# Patient Record
Sex: MALE | Race: WHITE | NOT HISPANIC OR LATINO | Employment: OTHER | URBAN - METROPOLITAN AREA
[De-identification: names, ages, dates, MRNs, and addresses within clinical notes are randomized per-mention and may not be internally consistent; named-entity substitution may affect disease eponyms.]

---

## 2017-03-10 ENCOUNTER — TELEPHONE (OUTPATIENT)
Dept: CARDIOLOGY | Facility: CLINIC | Age: 67
End: 2017-03-10

## 2017-03-10 NOTE — TELEPHONE ENCOUNTER
Patient called requesting samples of Brilinta.  I attempted to call patient back and left 2 messages for him to call back to find out what dose he was taking.   LMCTB at 11:58am  LMCTB at 3:52pm

## 2017-03-20 ENCOUNTER — OFFICE VISIT (OUTPATIENT)
Dept: CARDIOLOGY | Facility: CLINIC | Age: 67
End: 2017-03-20

## 2017-03-20 VITALS
HEIGHT: 71 IN | HEART RATE: 83 BPM | SYSTOLIC BLOOD PRESSURE: 130 MMHG | DIASTOLIC BLOOD PRESSURE: 78 MMHG | BODY MASS INDEX: 26.01 KG/M2 | WEIGHT: 185.8 LBS

## 2017-03-20 DIAGNOSIS — I25.10 CORONARY ARTERY DISEASE INVOLVING NATIVE CORONARY ARTERY OF NATIVE HEART WITHOUT ANGINA PECTORIS: Primary | ICD-10-CM

## 2017-03-20 DIAGNOSIS — I10 ESSENTIAL HYPERTENSION: ICD-10-CM

## 2017-03-20 DIAGNOSIS — Z72.0 TOBACCO ABUSE: ICD-10-CM

## 2017-03-20 DIAGNOSIS — E78.00 HYPERCHOLESTEROLEMIA: ICD-10-CM

## 2017-03-20 PROCEDURE — 99406 BEHAV CHNG SMOKING 3-10 MIN: CPT | Performed by: PHYSICIAN ASSISTANT

## 2017-03-20 PROCEDURE — 99214 OFFICE O/P EST MOD 30 MIN: CPT | Performed by: PHYSICIAN ASSISTANT

## 2017-03-20 PROCEDURE — 93000 ELECTROCARDIOGRAM COMPLETE: CPT | Performed by: PHYSICIAN ASSISTANT

## 2017-03-20 RX ORDER — SOTALOL HYDROCHLORIDE 80 MG/1
80 TABLET ORAL 2 TIMES DAILY
COMMUNITY
End: 2017-09-27 | Stop reason: SDUPTHER

## 2017-03-20 RX ORDER — NIFEDIPINE 30 MG/1
30 TABLET, EXTENDED RELEASE ORAL DAILY
COMMUNITY
End: 2017-09-27

## 2017-03-20 RX ORDER — ATORVASTATIN CALCIUM 20 MG/1
20 TABLET, FILM COATED ORAL DAILY
COMMUNITY
End: 2017-09-27 | Stop reason: SDUPTHER

## 2017-03-20 RX ORDER — ASPIRIN 325 MG
325 TABLET ORAL DAILY
COMMUNITY
End: 2017-09-27

## 2017-03-20 NOTE — PROGRESS NOTES
[facility] - Office Note  Scott Milan         [address] [place] [address]  1950   [phone] (home)      LOCATION:  [facility].  Visit Type: Follow Up.    PCP:  Tigre Cates MD    03/20/17   Scott Milan is a 67 y.o.  male  retired.      Chief Complaint: Follow up on CAD, HTN.  Problem list:  1.  Coronary artery disease   A.  [facility], February 20, 2015: ANALI to OM, 50% occlusion of mid RCA, 60-70% occlusion of LAD.   B.  scheduled functional assessment via left heart catheterization 3 weeks after intervention, patient did not keep appointment.   C.  recurrent chest pain with left heart catheterization May 12, 2015: Drug-eluting stent to LAD, drug-eluting stent to the RCA.  EF 50%.  2.  Essential hypertension  3.  Dyslipidemia  4.  COPD with ongoing tobacco abuse  5.  History of ventricular fibrillation with arrest, 2012  6.  Family history precocious CAD        Allergies   Allergen Reactions   • Clopidogrel  Rash, pruritus    • Prednisone          Current Outpatient Prescriptions:   •  aspirin 325 MG tablet, Take 325 mg by mouth Daily., Disp: , Rfl:   •  atorvastatin (LIPITOR) 20 MG tablet, Take 20 mg by mouth Daily., Disp: , Rfl:   •  HYDROcodone-acetaminophen (VICODIN) 7.5-500 MG per tablet, Take 1 tablet by mouth 2 (Two) Times a Day., Disp: , Rfl:   •  NIFEdipine XL (PROCARDIA XL) 30 MG 24 hr tablet, Take 30 mg by mouth Daily., Disp: , Rfl:   •  sotalol (BETAPACE) 80 MG tablet, Take 80 mg by mouth 2 (Two) Times a Day., Disp: , Rfl:   •  ticagrelor (BRILINTA) 90 MG tablet tablet, Take 90 mg by mouth Daily., Disp: , Rfl:     HPI  Mr. Milan is here for an overdue follow-up on his coronary artery disease and long-standing hypertension and dyslipidemia.  From a cardiac standpoint he's done well since his last visit.  He is then and able to take Plavix or Effient secondary to rash development.  He is currently on Brilinta and cannot afford it.   "We have been providing him samples and he is asking for more.  As far as his heart is concerned, he's had no cardiac issues.  He states he gets routine bloodwork with his PCP.  He's had no palpitations or acknowledgment of arrhythmia.  He does continue to smoke.  The following portions of the patient's history were reviewed in the chart and updated as appropriate: allergies, current medications, past family history, past medical history, past social history, past surgical history and problem list.    Review of Systems   Cardiovascular: Negative for chest pain and dyspnea on exertion.   Respiratory: Negative for shortness of breath.    All other systems reviewed and are negative.            height is 71\" (180.3 cm) and weight is 185 lb 12.8 oz (84.3 kg). His blood pressure is 130/78 and his pulse is 83.   Physical Exam   Constitutional: Vital signs are normal. He appears well-developed and well-nourished.   Cardiovascular: Normal rate, regular rhythm, S1 normal, S2 normal, normal heart sounds, intact distal pulses and normal pulses.    Pulmonary/Chest: Effort normal and breath sounds normal. He has no wheezes. He has no rhonchi. He has no rales.   Abdominal: Soft. Normal appearance and bowel sounds are normal. There is no hepatosplenomegaly.   Neurological: He is alert.           ECG 12 Lead  Date/Time: 3/20/2017 11:13 AM  Performed by: FRANK ISRAEL  Authorized by: FRANK ISRAEL   Previous ECG: no previous ECG available  Rhythm: sinus rhythm  Rate: normal  QRS axis: normal  Clinical impression: normal ECG             Assessment/ Plan     Coronary artery disease involving native coronary artery of native heart without angina pectoris:  Stable without symptoms.  Continue on current medical regimen.  [place] to samples were provided.  Return to clinic one year or sooner when necessary.    Essential hypertension:  Well-controlled.  Continue current medical regimen.    Hypercholesterolemia:  Continue statin and " appropriate diet.  Get routine bloodwork with PCP.    Tobacco abuse: Patient was educated on smoking cessation 3-5 minutes in duration.  I offered cessation aids such as nicotine patch, nicotine gum and even Chantix.  Patient states he wants to try to cut back on his own.  I've given him a goal to cut back to one half pack per day at time of follow-up.      Olive Kennedy PA-C  3/20/2017 1:43 PM      EMR Dragon/Transcription disclaimer:   Much of this encounter note is an electronic transcription/translation of spoken language to printed text. The electronic translation of spoken language may permit erroneous, or at times, nonsensical words or phrases to be inadvertently transcribed; Although I have reviewed the note for such errors, some may still exist.

## 2017-06-22 ENCOUNTER — APPOINTMENT (OUTPATIENT)
Dept: GENERAL RADIOLOGY | Facility: HOSPITAL | Age: 67
End: 2017-06-22

## 2017-06-22 ENCOUNTER — HOSPITAL ENCOUNTER (EMERGENCY)
Facility: HOSPITAL | Age: 67
Discharge: HOME | End: 2017-06-22
Attending: EMERGENCY MEDICINE | Admitting: EMERGENCY MEDICINE

## 2017-06-22 VITALS
TEMPERATURE: 97.7 F | HEIGHT: 67 IN | RESPIRATION RATE: 20 BRPM | BODY MASS INDEX: 29.03 KG/M2 | HEART RATE: 79 BPM | DIASTOLIC BLOOD PRESSURE: 90 MMHG | WEIGHT: 185 LBS | SYSTOLIC BLOOD PRESSURE: 154 MMHG | OXYGEN SATURATION: 97 %

## 2017-06-22 DIAGNOSIS — R05.9 COUGH: ICD-10-CM

## 2017-06-22 DIAGNOSIS — S16.1XXA ACUTE STRAIN OF NECK MUSCLE, INITIAL ENCOUNTER: ICD-10-CM

## 2017-06-22 DIAGNOSIS — J44.9 CHRONIC OBSTRUCTIVE PULMONARY DISEASE, UNSPECIFIED COPD TYPE (HCC): Primary | ICD-10-CM

## 2017-06-22 LAB
ALBUMIN SERPL-MCNC: 4.3 G/DL (ref 3.2–4.8)
ALBUMIN/GLOB SERPL: 1.5 G/DL (ref 1.5–2.5)
ALP SERPL-CCNC: 121 U/L (ref 25–100)
ALT SERPL W P-5'-P-CCNC: 17 U/L (ref 7–40)
ANION GAP SERPL CALCULATED.3IONS-SCNC: 7 MMOL/L (ref 3–11)
AST SERPL-CCNC: 18 U/L (ref 0–33)
BASOPHILS # BLD AUTO: 0.03 10*3/MM3 (ref 0–0.2)
BASOPHILS NFR BLD AUTO: 0.4 % (ref 0–1)
BILIRUB SERPL-MCNC: 0.4 MG/DL (ref 0.3–1.2)
BNP SERPL-MCNC: 11 PG/ML (ref 0–100)
BUN BLD-MCNC: 15 MG/DL (ref 9–23)
BUN/CREAT SERPL: 25 (ref 7–25)
CALCIUM SPEC-SCNC: 9.2 MG/DL (ref 8.7–10.4)
CHLORIDE SERPL-SCNC: 108 MMOL/L (ref 99–109)
CO2 SERPL-SCNC: 22 MMOL/L (ref 20–31)
CREAT BLD-MCNC: 0.6 MG/DL (ref 0.6–1.3)
DEPRECATED RDW RBC AUTO: 44.4 FL (ref 37–54)
EOSINOPHIL # BLD AUTO: 0.39 10*3/MM3 (ref 0.1–0.3)
EOSINOPHIL NFR BLD AUTO: 4.7 % (ref 0–3)
ERYTHROCYTE [DISTWIDTH] IN BLOOD BY AUTOMATED COUNT: 13.2 % (ref 11.3–14.5)
GFR SERPL CREATININE-BSD FRML MDRD: 134 ML/MIN/1.73
GLOBULIN UR ELPH-MCNC: 2.8 GM/DL
GLUCOSE BLD-MCNC: 95 MG/DL (ref 70–100)
HCT VFR BLD AUTO: 38.7 % (ref 38.9–50.9)
HGB BLD-MCNC: 12.4 G/DL (ref 13.1–17.5)
HOLD SPECIMEN: NORMAL
HOLD SPECIMEN: NORMAL
IMM GRANULOCYTES # BLD: 0.02 10*3/MM3 (ref 0–0.03)
IMM GRANULOCYTES NFR BLD: 0.2 % (ref 0–0.6)
LYMPHOCYTES # BLD AUTO: 2.15 10*3/MM3 (ref 0.6–4.8)
LYMPHOCYTES NFR BLD AUTO: 26.1 % (ref 24–44)
MCH RBC QN AUTO: 29.6 PG (ref 27–31)
MCHC RBC AUTO-ENTMCNC: 32 G/DL (ref 32–36)
MCV RBC AUTO: 92.4 FL (ref 80–99)
MONOCYTES # BLD AUTO: 0.87 10*3/MM3 (ref 0–1)
MONOCYTES NFR BLD AUTO: 10.6 % (ref 0–12)
NEUTROPHILS # BLD AUTO: 4.78 10*3/MM3 (ref 1.5–8.3)
NEUTROPHILS NFR BLD AUTO: 58 % (ref 41–71)
PLATELET # BLD AUTO: 227 10*3/MM3 (ref 150–450)
PMV BLD AUTO: 10.2 FL (ref 6–12)
POTASSIUM BLD-SCNC: 3.4 MMOL/L (ref 3.5–5.5)
PROT SERPL-MCNC: 7.1 G/DL (ref 5.7–8.2)
RBC # BLD AUTO: 4.19 10*6/MM3 (ref 4.2–5.76)
SODIUM BLD-SCNC: 137 MMOL/L (ref 132–146)
TROPONIN I SERPL-MCNC: 0 NG/ML (ref 0–0.07)
WBC NRBC COR # BLD: 8.24 10*3/MM3 (ref 3.5–10.8)
WHOLE BLOOD HOLD SPECIMEN: NORMAL
WHOLE BLOOD HOLD SPECIMEN: NORMAL

## 2017-06-22 PROCEDURE — 99284 EMERGENCY DEPT VISIT MOD MDM: CPT

## 2017-06-22 PROCEDURE — 71010 HC CHEST PA OR AP: CPT

## 2017-06-22 PROCEDURE — 94760 N-INVAS EAR/PLS OXIMETRY 1: CPT

## 2017-06-22 PROCEDURE — 80053 COMPREHEN METABOLIC PANEL: CPT | Performed by: EMERGENCY MEDICINE

## 2017-06-22 PROCEDURE — 83880 ASSAY OF NATRIURETIC PEPTIDE: CPT | Performed by: EMERGENCY MEDICINE

## 2017-06-22 PROCEDURE — 84484 ASSAY OF TROPONIN QUANT: CPT

## 2017-06-22 PROCEDURE — 85025 COMPLETE CBC W/AUTO DIFF WBC: CPT | Performed by: EMERGENCY MEDICINE

## 2017-06-22 PROCEDURE — 94799 UNLISTED PULMONARY SVC/PX: CPT

## 2017-06-22 PROCEDURE — 93005 ELECTROCARDIOGRAM TRACING: CPT

## 2017-06-22 RX ORDER — IPRATROPIUM BROMIDE AND ALBUTEROL SULFATE 2.5; .5 MG/3ML; MG/3ML
3 SOLUTION RESPIRATORY (INHALATION) ONCE
Status: COMPLETED | OUTPATIENT
Start: 2017-06-22 | End: 2017-06-22

## 2017-06-22 RX ORDER — SODIUM CHLORIDE 0.9 % (FLUSH) 0.9 %
10 SYRINGE (ML) INJECTION AS NEEDED
Status: DISCONTINUED | OUTPATIENT
Start: 2017-06-22 | End: 2017-06-22 | Stop reason: HOSPADM

## 2017-06-22 RX ORDER — CYCLOBENZAPRINE HCL 10 MG
10 TABLET ORAL 3 TIMES DAILY
Qty: 20 TABLET | Refills: 0 | Status: SHIPPED | OUTPATIENT
Start: 2017-06-22 | End: 2017-06-29

## 2017-06-22 RX ADMIN — IPRATROPIUM BROMIDE AND ALBUTEROL SULFATE 3 ML: .5; 3 SOLUTION RESPIRATORY (INHALATION) at 02:54

## 2017-06-22 NOTE — ED PROVIDER NOTES
Subjective   HPI Comments: Mr. Scott Milan is a 67 year old male who presents to the ED with c/o neck pain. For the past 4 or 5 days, Mr. Milan reports having a nonproductive cough which leaves him gasping for air. He reports that he is not able to breathe deeply without coughing. Yesterday, his neck began to hurt, and the pain radiates from the base of the neck up through the back of his head and down his spine. He has taken Lortab with no relief. He is a smoker with a hx of COPD. He received a PNA shot this year. He denies ever having a blood clot in his legs or chest, but has had stents placed in his heart. He reports no other sx at this time.    Patient is a 67 y.o. male presenting with neck pain.   History provided by:  Patient  Neck Pain   Pain location: Lower back of neck.  Quality:  Aching  Pain radiates to:  Head (Down spine)  Pain severity:  Moderate  Pain is:  Same all the time  Onset quality:  Sudden  Duration:  1 day  Timing:  Constant  Progression:  Worsening  Chronicity:  New  Context comment:  Recent cough  Relieved by:  Nothing  Worsened by:  Nothing  Ineffective treatments: Lortab.  Associated symptoms: chest pain    Associated symptoms: no syncope        Review of Systems   Eyes: Negative.    Respiratory: Positive for cough and shortness of breath.    Cardiovascular: Positive for chest pain. Negative for syncope.   Endocrine: Negative.    Musculoskeletal: Positive for neck pain.   Allergic/Immunologic: Negative.    All other systems reviewed and are negative.      Past Medical History:   Diagnosis Date   • Allergic rhinitis     seasonal   • Atrial fibrillation    • COPD (chronic obstructive pulmonary disease)    • Coronary artery disease    • History of cardiac catheterization    • Hypercholesterolemia    • Hyperlipidemia    • Hypertension    • Recurrent chest pain    • Tobacco abuse        Allergies   Allergen Reactions   • Effient [Prasugrel] Rash   • Clopidogrel    • Prednisone         Past Surgical History:   Procedure Laterality Date   • CORONARY ANGIOPLASTY WITH STENT PLACEMENT         History reviewed. No pertinent family history.    Social History     Social History   • Marital status:      Spouse name: N/A   • Number of children: N/A   • Years of education: N/A     Social History Main Topics   • Smoking status: Current Every Day Smoker     Packs/day: 1.00     Types: Cigarettes   • Smokeless tobacco: Never Used   • Alcohol use No   • Drug use: No   • Sexual activity: Defer     Other Topics Concern   • None     Social History Narrative   • None         Objective   Physical Exam   Constitutional: He is oriented to person, place, and time. He appears well-developed and well-nourished. No distress.   HENT:   Head: Normocephalic and atraumatic.   Eyes: Conjunctivae are normal. No scleral icterus.   Neck: Normal range of motion. Neck supple.   Tenderness to palpation to right paraspinal muscles of neck and right trapezius.   Cardiovascular: Normal rate and regular rhythm.  Exam reveals no gallop and no friction rub.    No murmur heard.  Pulmonary/Chest: Effort normal. He has rales (Expiratory rales).   Abdominal: Soft. Bowel sounds are normal. There is no tenderness. There is no rebound and no guarding.   Musculoskeletal: Normal range of motion. He exhibits no deformity.   Neurological: He is alert and oriented to person, place, and time.   Skin: Skin is warm and dry. He is not diaphoretic.   Psychiatric: He has a normal mood and affect. His behavior is normal.   Nursing note and vitals reviewed.      Procedures         ED Course  ED Course     Recent Results (from the past 24 hour(s))   Comprehensive Metabolic Panel    Collection Time: 06/22/17 12:43 AM   Result Value Ref Range    Glucose 95 70 - 100 mg/dL    BUN 15 9 - 23 mg/dL    Creatinine 0.60 0.60 - 1.30 mg/dL    Sodium 137 132 - 146 mmol/L    Potassium 3.4 (L) 3.5 - 5.5 mmol/L    Chloride 108 99 - 109 mmol/L    CO2 22.0 20.0 -  31.0 mmol/L    Calcium 9.2 8.7 - 10.4 mg/dL    Total Protein 7.1 5.7 - 8.2 g/dL    Albumin 4.30 3.20 - 4.80 g/dL    ALT (SGPT) 17 7 - 40 U/L    AST (SGOT) 18 0 - 33 U/L    Alkaline Phosphatase 121 (H) 25 - 100 U/L    Total Bilirubin 0.4 0.3 - 1.2 mg/dL    eGFR Non African Amer 134 >60 mL/min/1.73    Globulin 2.8 gm/dL    A/G Ratio 1.5 1.5 - 2.5 g/dL    BUN/Creatinine Ratio 25.0 7.0 - 25.0    Anion Gap 7.0 3.0 - 11.0 mmol/L   BNP    Collection Time: 06/22/17 12:43 AM   Result Value Ref Range    BNP 11.0 0.0 - 100.0 pg/mL   Light Blue Top    Collection Time: 06/22/17 12:43 AM   Result Value Ref Range    Extra Tube hold for add-on    Green Top (Gel)    Collection Time: 06/22/17 12:43 AM   Result Value Ref Range    Extra Tube Hold for add-ons.    Lavender Top    Collection Time: 06/22/17 12:43 AM   Result Value Ref Range    Extra Tube hold for add-on    Gold Top - SST    Collection Time: 06/22/17 12:43 AM   Result Value Ref Range    Extra Tube Hold for add-ons.    CBC Auto Differential    Collection Time: 06/22/17 12:43 AM   Result Value Ref Range    WBC 8.24 3.50 - 10.80 10*3/mm3    RBC 4.19 (L) 4.20 - 5.76 10*6/mm3    Hemoglobin 12.4 (L) 13.1 - 17.5 g/dL    Hematocrit 38.7 (L) 38.9 - 50.9 %    MCV 92.4 80.0 - 99.0 fL    MCH 29.6 27.0 - 31.0 pg    MCHC 32.0 32.0 - 36.0 g/dL    RDW 13.2 11.3 - 14.5 %    RDW-SD 44.4 37.0 - 54.0 fl    MPV 10.2 6.0 - 12.0 fL    Platelets 227 150 - 450 10*3/mm3    Neutrophil % 58.0 41.0 - 71.0 %    Lymphocyte % 26.1 24.0 - 44.0 %    Monocyte % 10.6 0.0 - 12.0 %    Eosinophil % 4.7 (H) 0.0 - 3.0 %    Basophil % 0.4 0.0 - 1.0 %    Immature Grans % 0.2 0.0 - 0.6 %    Neutrophils, Absolute 4.78 1.50 - 8.30 10*3/mm3    Lymphocytes, Absolute 2.15 0.60 - 4.80 10*3/mm3    Monocytes, Absolute 0.87 0.00 - 1.00 10*3/mm3    Eosinophils, Absolute 0.39 (H) 0.10 - 0.30 10*3/mm3    Basophils, Absolute 0.03 0.00 - 0.20 10*3/mm3    Immature Grans, Absolute 0.02 0.00 - 0.03 10*3/mm3   POC Troponin, Rapid     Collection Time: 06/22/17 12:50 AM   Result Value Ref Range    Troponin I 0.00 0.00 - 0.07 ng/mL     Note: In addition to lab results from this visit, the labs listed above may include labs taken at another facility or during a different encounter within the last 24 hours. Please correlate lab times with ED admission and discharge times for further clarification of the services performed during this visit.    XR Chest 1 View   Final Result   Abnormal     No acute findings visualized in the chest.       THIS DOCUMENT HAS BEEN ELECTRONICALLY SIGNED BY MYLENE FALL MD        Vitals:    06/22/17 0139 06/22/17 0140 06/22/17 0254 06/22/17 0304   BP: 157/86   154/90   BP Location:    Right arm   Patient Position:    Sitting   Pulse:  75 76 79   Resp:   20 20   Temp:       TempSrc:       SpO2:  95% 95% 97%   Weight:       Height:         Medications   sodium chloride 0.9 % flush 10 mL (not administered)   ipratropium-albuterol (DUO-NEB) nebulizer solution 3 mL (3 mL Nebulization Given 6/22/17 0254)     ECG/EMG Results (last 24 hours)     Procedure Component Value Units Date/Time    ECG 12 Lead [[id number]] Collected:  06/22/17 0005     Updated:  06/22/17 0007                          MDM  Number of Diagnoses or Management Options  Acute strain of neck muscle, initial encounter: new and requires workup  Chronic obstructive pulmonary disease, unspecified COPD type: new and requires workup  Cough: new and requires workup  Diagnosis management comments: Apply heat to the neck, and stretch.    Offered Robaxin here but patient refused.     Discharge with Flexeril to help with muscle strain.    Patient given albuterol/Atrovent neb treatment for COPD exacerbation.    Chest x-ray, and labs do not show any acute abnormalities.           Amount and/or Complexity of Data Reviewed  Clinical lab tests: ordered and reviewed  Tests in the radiology section of CPT®: ordered and reviewed  Obtain history from someone other than the patient:  yes  Review and summarize past medical records: yes  Independent visualization of images, tracings, or specimens: yes    Patient Progress  Patient progress: stable      Final diagnoses:   Chronic obstructive pulmonary disease, unspecified COPD type   Cough   Acute strain of neck muscle, initial encounter       Documentation assistance provided by yanci Doll.  Information recorded by the scribe was done at my direction and has been verified and validated by me.     Brad Doll  06/22/17 0255       Elizabeth Hurst  06/22/17 0259       Elizabeth Hurst  06/22/17 0259       Katerina Redman MD  06/22/17 0315

## 2017-09-27 ENCOUNTER — OFFICE VISIT (OUTPATIENT)
Dept: CARDIOLOGY | Facility: CLINIC | Age: 67
End: 2017-09-27

## 2017-09-27 VITALS
HEIGHT: 69 IN | WEIGHT: 188 LBS | DIASTOLIC BLOOD PRESSURE: 88 MMHG | SYSTOLIC BLOOD PRESSURE: 146 MMHG | BODY MASS INDEX: 27.85 KG/M2 | HEART RATE: 84 BPM

## 2017-09-27 DIAGNOSIS — I10 ESSENTIAL HYPERTENSION: ICD-10-CM

## 2017-09-27 DIAGNOSIS — I25.10 CORONARY ARTERY DISEASE INVOLVING NATIVE CORONARY ARTERY OF NATIVE HEART WITHOUT ANGINA PECTORIS: ICD-10-CM

## 2017-09-27 DIAGNOSIS — I48.0 PAROXYSMAL ATRIAL FIBRILLATION (HCC): Primary | ICD-10-CM

## 2017-09-27 DIAGNOSIS — E78.00 HYPERCHOLESTEROLEMIA: ICD-10-CM

## 2017-09-27 PROCEDURE — 93000 ELECTROCARDIOGRAM COMPLETE: CPT | Performed by: INTERNAL MEDICINE

## 2017-09-27 PROCEDURE — 99214 OFFICE O/P EST MOD 30 MIN: CPT | Performed by: INTERNAL MEDICINE

## 2017-09-27 RX ORDER — SOTALOL HYDROCHLORIDE 80 MG/1
80 TABLET ORAL 2 TIMES DAILY
Qty: 180 TABLET | Refills: 1 | Status: SHIPPED | OUTPATIENT
Start: 2017-09-27 | End: 2018-03-05 | Stop reason: SDUPTHER

## 2017-09-27 RX ORDER — ATORVASTATIN CALCIUM 20 MG/1
20 TABLET, FILM COATED ORAL DAILY
Qty: 90 TABLET | Refills: 1 | Status: SHIPPED | OUTPATIENT
Start: 2017-09-27 | End: 2018-03-05 | Stop reason: SDUPTHER

## 2017-09-27 RX ORDER — AMLODIPINE BESYLATE 5 MG/1
5 TABLET ORAL DAILY
Qty: 90 TABLET | Refills: 1 | Status: SHIPPED | OUTPATIENT
Start: 2017-09-27 | End: 2018-03-05 | Stop reason: SDUPTHER

## 2017-09-27 RX ORDER — AMLODIPINE BESYLATE 5 MG/1
5 TABLET ORAL DAILY
COMMUNITY
End: 2017-09-27 | Stop reason: SDUPTHER

## 2017-09-27 NOTE — PROGRESS NOTES
[facility]  Office visit  Scott Milan  1950  [phone]    VISIT DATE:  09/27/2017    PCP: Tigre Cates MD  [address] [place] [address]    CC:  Chief Complaint   Patient presents with   • Atrial Fibrillation     FOLLOW UP       PROBLEM LIST:  1.  Coronary artery disease                        A.  [facility], February 20, 2015: ANALI to OM, 50% occlusion of mid RCA, 60-70% occlusion of LAD.                        B.  scheduled functional assessment via left heart catheterization 3 weeks after intervention, patient did not keep appointment.                        C.  recurrent chest pain with left heart catheterization May 12, 2015: Drug-eluting stent to LAD, drug-eluting stent to the RCA.  EF 50%.  2.  Essential hypertension  3.  Dyslipidemia  4.  COPD with ongoing tobacco abuse  5.  History of ventricular fibrillation with arrest, 2012  6.  Family history precocious CAD     ASSESSMENT:   Diagnosis Plan   1. Paroxysmal atrial fibrillation     2. Coronary artery disease involving native coronary artery of native heart without angina pectoris     3. Essential hypertension     4. Hypercholesterolemia         PLAN:  Paroxysmal atrial fibrillation: Currently stable and asymptomatic.  Chads Vascor equal to 3, previously intolerant anticoagulation.  Continue aspirin.  Continue sotalol 80 mg by mouth twice a day.    Coronary artery disease: Stable.  Continue aspirin and statin therapy.    Hyperlipidemia: Continue statin.    Hypertension: Goal less than 140/90 mmHg.  Continue amlodipine 5 mg by mouth daily.    Subjective  Currently denies chest pain, limiting dyspnea on exertion or palpitations.  Has significant difficulty in affording his medications.  Does report that Xarelto caused lightheadedness and stopped taking it.  Was recently evaluated [facility] for generalized fatigue and weakness.  Some of his medications were adjusted at that time.  Been checking blood  "pressures at home.  Denies easy bruising or bleeding complications.  No myalgias on statin therapy.    PHYSICAL EXAMINATION:  Vitals:    09/27/17 1552   BP: 146/88   BP Location: Right arm   Patient Position: Sitting   Pulse: 84   Weight: 188 lb (85.3 kg)   Height: 69\" (175.3 cm)     General Appearance:    Alert, cooperative, no distress, appears stated age   Head:    Normocephalic, without obvious abnormality, atraumatic   Eyes:    conjunctiva/corneas clear   Nose:   Nares normal, septum midline, mucosa normal, no drainage   Throat:   Lips, teeth and gums normal   Neck:   Supple, symmetrical, trachea midline, no carotid    bruit or JVD   Lungs:     Clear to auscultation bilaterally, respirations unlabored   Chest Wall:    No tenderness or deformity    Heart:    Regular rate and rhythm, S1 and S2 normal, no murmur, rub   or gallop, normal carotid impulse bilaterally without bruit.   Abdomen:     Soft, non-tender   Extremities:   Extremities normal, atraumatic, no cyanosis or edema   Pulses:   2+ and symmetric all extremities   Skin:   Skin color, texture, turgor normal, no rashes or lesions       Diagnostic Data:    ECG 12 Lead  Date/Time: 9/27/2017 4:09 PM  Performed by: SELENE LOPEZ III  Authorized by: SELENE LOPEZ III   Rhythm: sinus rhythm  Rate: normal  Conduction: incomplete RBBB  QRS axis: normal  Clinical impression: non-specific ECG          Lab Results   Component Value Date    CHLPL 87 05/12/2015    TRIG 114 05/12/2015    HDL 25 (L) 05/12/2015    LDLDIRECT 45 05/12/2015     Lab Results   Component Value Date    GLUCOSE 95 06/22/2017    BUN 15 06/22/2017    CREATININE 0.60 06/22/2017     06/22/2017    K 3.4 (L) 06/22/2017     06/22/2017    CO2 22.0 06/22/2017     Lab Results   Component Value Date    HGBA1C 5.8 05/12/2015     Lab Results   Component Value Date    WBC 8.24 06/22/2017    HGB 12.4 (L) 06/22/2017    HCT 38.7 (L) 06/22/2017     06/22/2017       Allergies  Allergies "   Allergen Reactions   • Effient [Prasugrel] Rash   • Clopidogrel    • Prednisone        Current Medications    Current Outpatient Prescriptions:   •  amLODIPine (NORVASC) 5 MG tablet, Take 5 mg by mouth Daily., Disp: , Rfl:   •  aspirin 81 MG tablet, Take 81 mg by mouth Daily., Disp: , Rfl:   •  atorvastatin (LIPITOR) 20 MG tablet, Take 20 mg by mouth Daily., Disp: , Rfl:   •  rivaroxaban (XARELTO) 20 MG tablet, Take 20 mg by mouth Daily., Disp: , Rfl:   •  sotalol (BETAPACE) 80 MG tablet, Take 80 mg by mouth 2 (Two) Times a Day., Disp: , Rfl:   •  NIFEdipine XL (PROCARDIA XL) 30 MG 24 hr tablet, Take 30 mg by mouth Daily., Disp: , Rfl:           ROS  Review of Systems   Constitution: Positive for malaise/fatigue.   Cardiovascular: Positive for dyspnea on exertion. Negative for chest pain and claudication.         SOCIAL HX  Social History     Social History   • Marital status:      Spouse name: N/A   • Number of children: N/A   • Years of education: N/A     Occupational History   • Not on file.     Social History Main Topics   • Smoking status: Current Every Day Smoker     Packs/day: 1.00     Types: Cigarettes   • Smokeless tobacco: Never Used   • Alcohol use No   • Drug use: No   • Sexual activity: Defer     Other Topics Concern   • Not on file     Social History Narrative       FAMILY HX  History reviewed. No pertinent family history.          Sai Mukherjee III, MD, [facility]

## 2018-03-05 ENCOUNTER — OFFICE VISIT (OUTPATIENT)
Dept: CARDIOLOGY | Facility: CLINIC | Age: 68
End: 2018-03-05

## 2018-03-05 VITALS
WEIGHT: 196 LBS | SYSTOLIC BLOOD PRESSURE: 140 MMHG | BODY MASS INDEX: 29.03 KG/M2 | HEIGHT: 69 IN | DIASTOLIC BLOOD PRESSURE: 72 MMHG | HEART RATE: 78 BPM

## 2018-03-05 DIAGNOSIS — I10 ESSENTIAL HYPERTENSION: ICD-10-CM

## 2018-03-05 DIAGNOSIS — I48.0 PAROXYSMAL ATRIAL FIBRILLATION (HCC): Primary | ICD-10-CM

## 2018-03-05 DIAGNOSIS — Z72.0 TOBACCO ABUSE: ICD-10-CM

## 2018-03-05 DIAGNOSIS — I25.10 CORONARY ARTERY DISEASE INVOLVING NATIVE CORONARY ARTERY OF NATIVE HEART WITHOUT ANGINA PECTORIS: ICD-10-CM

## 2018-03-05 DIAGNOSIS — E78.00 HYPERCHOLESTEROLEMIA: ICD-10-CM

## 2018-03-05 PROCEDURE — 99214 OFFICE O/P EST MOD 30 MIN: CPT | Performed by: INTERNAL MEDICINE

## 2018-03-05 PROCEDURE — 93000 ELECTROCARDIOGRAM COMPLETE: CPT | Performed by: INTERNAL MEDICINE

## 2018-03-05 RX ORDER — AMLODIPINE BESYLATE 5 MG/1
5 TABLET ORAL DAILY
Qty: 90 TABLET | Refills: 1 | Status: SHIPPED | OUTPATIENT
Start: 2018-03-05 | End: 2019-10-28 | Stop reason: SDUPTHER

## 2018-03-05 RX ORDER — SOTALOL HYDROCHLORIDE 80 MG/1
80 TABLET ORAL 2 TIMES DAILY
Qty: 180 TABLET | Refills: 1 | Status: SHIPPED | OUTPATIENT
Start: 2018-03-05 | End: 2019-10-28 | Stop reason: SDUPTHER

## 2018-03-05 RX ORDER — ATORVASTATIN CALCIUM 20 MG/1
20 TABLET, FILM COATED ORAL DAILY
Qty: 90 TABLET | Refills: 1 | Status: SHIPPED | OUTPATIENT
Start: 2018-03-05 | End: 2019-10-28 | Stop reason: SDUPTHER

## 2018-03-05 RX ORDER — HYDROCODONE BITARTRATE AND ACETAMINOPHEN 7.5; 325 MG/1; MG/1
1 TABLET ORAL EVERY 6 HOURS PRN
COMMUNITY
End: 2022-01-26

## 2018-03-05 NOTE — PROGRESS NOTES
[facility]  Office visit  Scott Milan  1950  [phone]    VISIT DATE:  09/27/2017    PCP: Tigre Cates MD  [address] [place] [address]    CC:  Chief Complaint   Patient presents with   • Atrial Fibrillation   • Coronary Artery Disease       PROBLEM LIST:  1.  Coronary artery disease                        A.  [facility], February 20, 2015: ANALI to OM, 50% occlusion of mid RCA, 60-70% occlusion of LAD.                        B.  scheduled functional assessment via left heart catheterization 3 weeks after intervention, patient did not keep appointment.                        C.  recurrent chest pain with left heart catheterization May 12, 2015: Drug-eluting stent to LAD, drug-eluting stent to the RCA.  EF 50%.  2.  Essential hypertension  3.  Dyslipidemia  4.  COPD with ongoing tobacco abuse  5.  History of ventricular fibrillation with arrest, 2012  6.  Family history precocious CAD     ASSESSMENT:   Diagnosis Plan   1. Paroxysmal atrial fibrillation     2. Coronary artery disease involving native coronary artery of native heart without angina pectoris     3. Essential hypertension     4. Hypercholesterolemia         PLAN:  Paroxysmal atrial fibrillation: Currently stable and asymptomatic.  Chads Vascor equal to 3, previously intolerant anticoagulation.  Continue aspirin.  Continue sotalol 80 mg by mouth twice a day.    Coronary artery disease: Stable.  Continue aspirin and statin therapy.    Hyperlipidemia: Continue statin.Goal LDL less than 100.    Hypertension: Goal less than 130/80 mmHg.  Continue amlodipine 5 mg by mouth daily.    Nicotine abuse: Counseled on need for smoking cessation.  Currently not ready to quit.    Subjective  Currently denies chest pain, limiting dyspnea on exertion or palpitations. Xarelto caused lightheadedness and stopped taking it.   Denies easy bruising or bleeding complications.  No myalgias on statin therapy.  Compliant with sotalol.   "Reports mild lightheadedness about 30 minutes after taking his amlodipine.  Currently smoking.    PHYSICAL EXAMINATION:  Vitals:    03/05/18 1021   Weight: 88.9 kg (196 lb)   Height: 175.3 cm (69\")     General Appearance:    Alert, cooperative, no distress, appears stated age   Head:    Normocephalic, without obvious abnormality, atraumatic   Eyes:    conjunctiva/corneas clear   Nose:   Nares normal, septum midline, mucosa normal, no drainage   Throat:   Lips, teeth and gums normal   Neck:   Supple, symmetrical, trachea midline, no carotid    bruit or JVD   Lungs:     Clear to auscultation bilaterally, respirations unlabored   Chest Wall:    No tenderness or deformity    Heart:    Regular rate and rhythm, S1 and S2 normal, no murmur, rub   or gallop, normal carotid impulse bilaterally without bruit.   Abdomen:     Soft, non-tender   Extremities:   Extremities normal, atraumatic, no cyanosis or edema   Pulses:   2+ and symmetric all extremities   Skin:   Skin color, texture, turgor normal, no rashes or lesions       Diagnostic Data:    ECG 12 Lead  Date/Time: 3/5/2018 10:30 AM  Performed by: SELENE LOPEZ III  Authorized by: SELENE LOPEZ III   Comparison: compared with previous ECG   Similar to previous ECG  Rhythm: sinus rhythm  Clinical impression: normal ECG          Lab Results   Component Value Date    CHLPL 87 05/12/2015    TRIG 114 05/12/2015    HDL 25 (L) 05/12/2015     Lab Results   Component Value Date    GLUCOSE 95 06/22/2017    BUN 15 06/22/2017    CREATININE 0.60 06/22/2017     06/22/2017    K 3.4 (L) 06/22/2017     06/22/2017    CO2 22.0 06/22/2017     Lab Results   Component Value Date    HGBA1C 5.8 05/12/2015     Lab Results   Component Value Date    WBC 8.24 06/22/2017    HGB 12.4 (L) 06/22/2017    HCT 38.7 (L) 06/22/2017     06/22/2017       Allergies  Allergies   Allergen Reactions   • Effient [Prasugrel] Rash   • Clopidogrel    • Prednisone        Current Medications    Current " Outpatient Prescriptions:   •  amLODIPine (NORVASC) 5 MG tablet, Take 1 tablet by mouth Daily., Disp: 90 tablet, Rfl: 1  •  aspirin 81 MG tablet, Take 81 mg by mouth Daily., Disp: , Rfl:   •  atorvastatin (LIPITOR) 20 MG tablet, Take 1 tablet by mouth Daily., Disp: 90 tablet, Rfl: 1  •  sotalol (BETAPACE) 80 MG tablet, Take 1 tablet by mouth 2 (Two) Times a Day., Disp: 180 tablet, Rfl: 1          ROS  Review of Systems   Cardiovascular: Negative for chest pain, claudication and dyspnea on exertion.   Respiratory: Negative for cough and shortness of breath.          SOCIAL HX  Social History     Social History   • Marital status:      Spouse name: N/A   • Number of children: N/A   • Years of education: N/A     Occupational History   • Not on file.     Social History Main Topics   • Smoking status: Current Every Day Smoker     Packs/day: 2.00     Types: Cigarettes   • Smokeless tobacco: Never Used   • Alcohol use No   • Drug use: No   • Sexual activity: Defer     Other Topics Concern   • Not on file     Social History Narrative       FAMILY HX  No family history on file.          Sai Mukherjee III, MD, FACC

## 2019-10-28 ENCOUNTER — OFFICE VISIT (OUTPATIENT)
Dept: CARDIOLOGY | Facility: CLINIC | Age: 69
End: 2019-10-28

## 2019-10-28 VITALS
HEIGHT: 69 IN | WEIGHT: 183 LBS | BODY MASS INDEX: 27.11 KG/M2 | DIASTOLIC BLOOD PRESSURE: 70 MMHG | SYSTOLIC BLOOD PRESSURE: 138 MMHG | HEART RATE: 81 BPM

## 2019-10-28 DIAGNOSIS — I25.10 CORONARY ARTERY DISEASE INVOLVING NATIVE CORONARY ARTERY OF NATIVE HEART WITHOUT ANGINA PECTORIS: ICD-10-CM

## 2019-10-28 DIAGNOSIS — E78.00 HYPERCHOLESTEROLEMIA: ICD-10-CM

## 2019-10-28 DIAGNOSIS — I10 ESSENTIAL HYPERTENSION: ICD-10-CM

## 2019-10-28 DIAGNOSIS — I48.0 PAROXYSMAL ATRIAL FIBRILLATION (HCC): Primary | ICD-10-CM

## 2019-10-28 PROCEDURE — 99214 OFFICE O/P EST MOD 30 MIN: CPT | Performed by: INTERNAL MEDICINE

## 2019-10-28 PROCEDURE — 93000 ELECTROCARDIOGRAM COMPLETE: CPT | Performed by: INTERNAL MEDICINE

## 2019-10-28 RX ORDER — SOTALOL HYDROCHLORIDE 80 MG/1
80 TABLET ORAL 2 TIMES DAILY
Qty: 180 TABLET | Refills: 1 | Status: SHIPPED | OUTPATIENT
Start: 2019-10-28 | End: 2021-01-04

## 2019-10-28 RX ORDER — ATORVASTATIN CALCIUM 20 MG/1
20 TABLET, FILM COATED ORAL DAILY
Qty: 90 TABLET | Refills: 3 | Status: SHIPPED | OUTPATIENT
Start: 2019-10-28 | End: 2021-01-15

## 2019-10-28 RX ORDER — AMLODIPINE BESYLATE 5 MG/1
5 TABLET ORAL DAILY
Qty: 90 TABLET | Refills: 3 | Status: SHIPPED | OUTPATIENT
Start: 2019-10-28 | End: 2021-01-15

## 2019-10-28 NOTE — PROGRESS NOTES
[facility]  Office visit  Scott Milan  1950  [phone]    VISIT DATE:  10/28/2019      PCP: Tigre Cates MD  [address] [place] [address]    CC:  Chief Complaint   Patient presents with   • Paroxysmal atrial fibrillation (CMS/HCC)       PROBLEM LIST:  1.  Coronary artery disease                        A.  [facility], February 20, 2015: ANALI to OM, 50% occlusion of mid RCA, 60-70% occlusion of LAD.                        B.  scheduled functional assessment via left heart catheterization 3 weeks after intervention, patient did not keep appointment.                        C.  recurrent chest pain with left heart catheterization May 12, 2015: Drug-eluting stent to LAD, drug-eluting stent to the RCA.  EF 50%.  2.  Essential hypertension  3.  Dyslipidemia  4.  COPD with ongoing tobacco abuse  5.  History of ventricular fibrillation with arrest, 2012  6.  Family history precocious CAD     ASSESSMENT:   Diagnosis Plan   1. Paroxysmal atrial fibrillation (CMS/HCC)     2. Coronary artery disease involving native coronary artery of native heart without angina pectoris     3. Essential hypertension     4. Hypercholesterolemia         PLAN:  Paroxysmal atrial fibrillation: Currently stable and asymptomatic.  Chads Vascor equal to 3, previously intolerant anticoagulation.  Continue aspirin.  Continue sotalol 80 mg by mouth twice a day.    Coronary artery disease: Stable.  Continue aspirin and statin therapy.    Hyperlipidemia: Continue statin.Goal LDL less than 100.    Hypertension: Goal less than 130/80 mmHg.  Continue amlodipine 5 mg by mouth daily.    Nicotine abuse: Counseled on need for smoking cessation.  Currently not ready to quit.    Subjective  Currently denies chest pain, limiting dyspnea on exertion or palpitations. Xarelto caused lightheadedness and stopped taking it.   Denies easy bruising or bleeding complications.  No myalgias on statin therapy.  Compliant with  "sotalol.  Currently smoking about 2 packs/day.  Still working as an  man, walking up and down stairs without difficulty.    PHYSICAL EXAMINATION:  Vitals:    10/28/19 1405   BP: 138/70   BP Location: Right arm   Patient Position: Sitting   Pulse: 81   Weight: 83 kg (183 lb)   Height: 175.3 cm (69\")     General Appearance:    Alert, cooperative, no distress, appears stated age   Head:    Normocephalic, without obvious abnormality, atraumatic   Eyes:    conjunctiva/corneas clear   Nose:   Nares normal, septum midline, mucosa normal, no drainage   Throat:   Lips, teeth and gums normal   Neck:   Supple, symmetrical, trachea midline, no carotid    bruit or JVD   Lungs:     Clear to auscultation bilaterally, respirations unlabored   Chest Wall:    No tenderness or deformity    Heart:    Regular rate and rhythm, S1 and S2 normal, no murmur, rub   or gallop, normal carotid impulse bilaterally without bruit.   Abdomen:     Soft, non-tender   Extremities:   Extremities normal, atraumatic, no cyanosis or edema   Pulses:   2+ and symmetric all extremities   Skin:   Skin color, texture, turgor normal, no rashes or lesions       Diagnostic Data:    ECG 12 Lead  Date/Time: 10/28/2019 2:07 PM  Performed by: Sai Mukherjee III, MD  Authorized by: Sai Mukherjee III, MD   Comparison: compared with previous ECG from 3/5/2019  Similar to previous ECG  Rhythm: sinus rhythm          Lab Results   Component Value Date    CHLPL 87 05/12/2015    TRIG 114 05/12/2015    HDL 25 (L) 05/12/2015     Lab Results   Component Value Date    GLUCOSE 95 06/22/2017    BUN 15 06/22/2017    CREATININE 0.60 06/22/2017     06/22/2017    K 3.4 (L) 06/22/2017     06/22/2017    CO2 22.0 06/22/2017     Lab Results   Component Value Date    HGBA1C 5.8 05/12/2015     Lab Results   Component Value Date    WBC 8.24 06/22/2017    HGB 12.4 (L) 06/22/2017    HCT 38.7 (L) 06/22/2017     06/22/2017       Allergies  Allergies "   Allergen Reactions   • Effient [Prasugrel] Rash   • Clopidogrel    • Prednisone        Current Medications    Current Outpatient Medications:   •  amLODIPine (NORVASC) 5 MG tablet, Take 1 tablet by mouth Daily., Disp: 90 tablet, Rfl: 3  •  aspirin 81 MG tablet, Take 81 mg by mouth Daily., Disp: , Rfl:   •  atorvastatin (LIPITOR) 20 MG tablet, Take 1 tablet by mouth Daily., Disp: 90 tablet, Rfl: 3  •  HYDROcodone-acetaminophen (NORCO) 7.5-325 MG per tablet, Take 1 tablet by mouth Every 6 (Six) Hours As Needed for Moderate Pain ., Disp: , Rfl:   •  sotalol (BETAPACE) 80 MG tablet, Take 1 tablet by mouth 2 (Two) Times a Day., Disp: 180 tablet, Rfl: 1          ROS  Review of Systems   Cardiovascular: Negative for chest pain, claudication and dyspnea on exertion.   Respiratory: Negative for cough and shortness of breath.          SOCIAL HX  Social History     Socioeconomic History   • Marital status:      Spouse name: Not on file   • Number of children: Not on file   • Years of education: Not on file   • Highest education level: Not on file   Tobacco Use   • Smoking status: Current Every Day Smoker     Packs/day: 2.00     Types: Cigarettes   • Smokeless tobacco: Never Used   Substance and Sexual Activity   • Alcohol use: No   • Drug use: No   • Sexual activity: Defer       FAMILY HX  Family History   Problem Relation Age of Onset   • No Known Problems Mother    • No Known Problems Father              Sai Mukherjee III, MD, FACC

## 2020-05-04 ENCOUNTER — OFFICE VISIT (OUTPATIENT)
Dept: CARDIOLOGY | Facility: CLINIC | Age: 70
End: 2020-05-04

## 2020-05-04 VITALS
HEART RATE: 80 BPM | HEIGHT: 69 IN | WEIGHT: 185 LBS | BODY MASS INDEX: 27.4 KG/M2 | SYSTOLIC BLOOD PRESSURE: 140 MMHG | DIASTOLIC BLOOD PRESSURE: 80 MMHG

## 2020-05-04 DIAGNOSIS — I48.0 PAROXYSMAL ATRIAL FIBRILLATION (HCC): Primary | ICD-10-CM

## 2020-05-04 DIAGNOSIS — I10 ESSENTIAL HYPERTENSION: ICD-10-CM

## 2020-05-04 DIAGNOSIS — E78.00 HYPERCHOLESTEROLEMIA: ICD-10-CM

## 2020-05-04 DIAGNOSIS — I25.10 CORONARY ARTERY DISEASE INVOLVING NATIVE CORONARY ARTERY OF NATIVE HEART WITHOUT ANGINA PECTORIS: ICD-10-CM

## 2020-05-04 PROCEDURE — 99442 PR PHYS/QHP TELEPHONE EVALUATION 11-20 MIN: CPT | Performed by: INTERNAL MEDICINE

## 2020-05-04 NOTE — PROGRESS NOTES
[facility]  Office visit  Scott Milan  1950  [phone]    VISIT DATE:  5/4/2020 routine    This patient has consented to a telehealth visit via telephone routine. The visit was scheduled as a routine visit to comply with patient safety concerns in accordance with CDC recommendations.  All vitals recorded within this visit are reported by the patient.  I spent 15 minutes in total including but not limited to the 11 minutes spent in direct conversation with this patient.       PCP: Tigre Cates MD  [address] [place] [address]    CC:  Chief Complaint   Patient presents with   • Paroxysmal atrial fibrillation (CMS/HCC)       PROBLEM LIST:  1.  Coronary artery disease                        A.  [facility], February 20, 2015: ANALI to OM, 50% occlusion of mid RCA, 60-70% occlusion of LAD.                        B.  scheduled functional assessment via left heart catheterization 3 weeks after intervention, patient did not keep appointment.                        C.  recurrent chest pain with left heart catheterization May 12, 2015: Drug-eluting stent to LAD, drug-eluting stent to the RCA.  EF 50%.  2.  Essential hypertension  3.  Dyslipidemia  4.  COPD with ongoing tobacco abuse  5.  History of ventricular fibrillation with arrest, 2012  6.  Family history precocious CAD     ASSESSMENT:   Diagnosis Plan   1. Paroxysmal atrial fibrillation (CMS/HCC)     2. Coronary artery disease involving native coronary artery of native heart without angina pectoris     3. Essential hypertension     4. Hypercholesterolemia         PLAN:  Paroxysmal atrial fibrillation: Currently stable and asymptomatic.  Chads Vascor equal to 3, previously intolerant anticoagulation.  Continue aspirin.  Continue sotalol 80 mg by mouth twice a day.    Coronary artery disease: Stable.  Continue aspirin and statin therapy.    Hyperlipidemia: Continue statin.Goal LDL less than 100.  Previously well controlled  "other than low HDL level due to ongoing smoking.    Hypertension: Goal less than 130/80 mmHg.  Continue amlodipine 5 mg by mouth daily.    Nicotine abuse: Counseled on need for smoking cessation.  Currently not ready to quit.    Subjective  Currently denies chest pain, limiting dyspnea on exertion or palpitations. Xarelto caused lightheadedness and stopped taking it.   Denies easy bruising or bleeding complications.  No myalgias on statin therapy.  Compliant with sotalol.  Currently smoking about 1 pack/day.  Still working as an  man, walking up and down stairs without difficulty.    PHYSICAL EXAMINATION:  Vitals:    05/04/20 1329   BP: 140/80   BP Location: Left arm   Patient Position: Sitting   Pulse: 80   Weight: 83.9 kg (185 lb)   Height: 175.3 cm (69\")       Diagnostic Data:  Procedures  Lab Results   Component Value Date    CHLPL 87 05/12/2015    TRIG 114 05/12/2015    HDL 25 (L) 05/12/2015     Lab Results   Component Value Date    GLUCOSE 95 06/22/2017    BUN 15 06/22/2017    CREATININE 0.60 06/22/2017     06/22/2017    K 3.4 (L) 06/22/2017     06/22/2017    CO2 22.0 06/22/2017     Lab Results   Component Value Date    HGBA1C 5.8 05/12/2015     Lab Results   Component Value Date    WBC 8.24 06/22/2017    HGB 12.4 (L) 06/22/2017    HCT 38.7 (L) 06/22/2017     06/22/2017       Allergies  Allergies   Allergen Reactions   • Effient [Prasugrel] Rash   • Clopidogrel    • Prednisone        Current Medications    Current Outpatient Medications:   •  amLODIPine (NORVASC) 5 MG tablet, Take 1 tablet by mouth Daily., Disp: 90 tablet, Rfl: 3  •  aspirin 81 MG tablet, Take 81 mg by mouth Daily., Disp: , Rfl:   •  atorvastatin (LIPITOR) 20 MG tablet, Take 1 tablet by mouth Daily., Disp: 90 tablet, Rfl: 3  •  HYDROcodone-acetaminophen (NORCO) 7.5-325 MG per tablet, Take 1 tablet by mouth Every 6 (Six) Hours As Needed for Moderate Pain ., Disp: , Rfl:   •  sotalol (BETAPACE) 80 MG " tablet, Take 1 tablet by mouth 2 (Two) Times a Day., Disp: 180 tablet, Rfl: 1          ROS  Review of Systems   Cardiovascular: Negative for chest pain, claudication and dyspnea on exertion.   Respiratory: Negative for cough and shortness of breath.          SOCIAL HX  Social History     Socioeconomic History   • Marital status:      Spouse name: Not on file   • Number of children: Not on file   • Years of education: Not on file   • Highest education level: Not on file   Tobacco Use   • Smoking status: Current Every Day Smoker     Packs/day: 2.00     Types: Cigarettes   • Smokeless tobacco: Never Used   Substance and Sexual Activity   • Alcohol use: No   • Drug use: No   • Sexual activity: Defer       FAMILY HX  Family History   Problem Relation Age of Onset   • No Known Problems Mother    • No Known Problems Father              Sai Mukherjee III, MD, FACC

## 2020-12-30 ENCOUNTER — DOCUMENTATION (OUTPATIENT)
Dept: CARDIOLOGY | Facility: CLINIC | Age: 70
End: 2020-12-30

## 2021-01-04 RX ORDER — SOTALOL HYDROCHLORIDE 80 MG/1
TABLET ORAL
Qty: 180 TABLET | Refills: 0 | Status: SHIPPED | OUTPATIENT
Start: 2021-01-04

## 2021-01-15 RX ORDER — ATORVASTATIN CALCIUM 20 MG/1
20 TABLET, FILM COATED ORAL DAILY
Qty: 90 TABLET | Refills: 0 | Status: SHIPPED | OUTPATIENT
Start: 2021-01-15 | End: 2022-06-27

## 2021-01-15 RX ORDER — AMLODIPINE BESYLATE 5 MG/1
5 TABLET ORAL DAILY
Qty: 90 TABLET | Refills: 0 | Status: SHIPPED | OUTPATIENT
Start: 2021-01-15

## 2021-11-16 ENCOUNTER — APPOINTMENT (OUTPATIENT)
Dept: GENERAL RADIOLOGY | Facility: HOSPITAL | Age: 71
End: 2021-11-16

## 2021-11-16 ENCOUNTER — HOSPITAL ENCOUNTER (EMERGENCY)
Facility: HOSPITAL | Age: 71
Discharge: HOME | End: 2021-11-16
Attending: EMERGENCY MEDICINE | Admitting: EMERGENCY MEDICINE

## 2021-11-16 VITALS
HEART RATE: 96 BPM | DIASTOLIC BLOOD PRESSURE: 73 MMHG | TEMPERATURE: 98 F | SYSTOLIC BLOOD PRESSURE: 131 MMHG | RESPIRATION RATE: 16 BRPM | HEIGHT: 70 IN | BODY MASS INDEX: 27.2 KG/M2 | WEIGHT: 190 LBS | OXYGEN SATURATION: 95 %

## 2021-11-16 DIAGNOSIS — E87.6 HYPOKALEMIA: ICD-10-CM

## 2021-11-16 DIAGNOSIS — T14.90XA INHALATION INJURY: ICD-10-CM

## 2021-11-16 DIAGNOSIS — Z86.79 HISTORY OF CORONARY ARTERY DISEASE: ICD-10-CM

## 2021-11-16 DIAGNOSIS — R07.9 CHEST PAIN, UNSPECIFIED TYPE: Primary | ICD-10-CM

## 2021-11-16 DIAGNOSIS — Z87.09 HISTORY OF COPD: ICD-10-CM

## 2021-11-16 DIAGNOSIS — G47.09 OTHER INSOMNIA: ICD-10-CM

## 2021-11-16 LAB
ALBUMIN SERPL-MCNC: 4 G/DL (ref 3.5–5.2)
ALBUMIN/GLOB SERPL: 1.7 G/DL
ALP SERPL-CCNC: 114 U/L (ref 39–117)
ALT SERPL W P-5'-P-CCNC: 19 U/L (ref 1–41)
ANION GAP SERPL CALCULATED.3IONS-SCNC: 14 MMOL/L (ref 5–15)
AST SERPL-CCNC: 15 U/L (ref 1–40)
BASOPHILS # BLD AUTO: 0.08 10*3/MM3 (ref 0–0.2)
BASOPHILS NFR BLD AUTO: 0.8 % (ref 0–1.5)
BILIRUB SERPL-MCNC: 0.5 MG/DL (ref 0–1.2)
BUN SERPL-MCNC: 14 MG/DL (ref 8–23)
BUN/CREAT SERPL: 21.2 (ref 7–25)
CALCIUM SPEC-SCNC: 8.7 MG/DL (ref 8.6–10.5)
CHLORIDE SERPL-SCNC: 105 MMOL/L (ref 98–107)
CO2 SERPL-SCNC: 20 MMOL/L (ref 22–29)
CREAT SERPL-MCNC: 0.66 MG/DL (ref 0.76–1.27)
D DIMER PPP FEU-MCNC: 0.35 MCGFEU/ML (ref 0–0.56)
DEPRECATED RDW RBC AUTO: 41.2 FL (ref 37–54)
EOSINOPHIL # BLD AUTO: 0.32 10*3/MM3 (ref 0–0.4)
EOSINOPHIL NFR BLD AUTO: 3.4 % (ref 0.3–6.2)
ERYTHROCYTE [DISTWIDTH] IN BLOOD BY AUTOMATED COUNT: 12.6 % (ref 12.3–15.4)
FLUAV RNA RESP QL NAA+PROBE: NOT DETECTED
FLUBV RNA RESP QL NAA+PROBE: NOT DETECTED
GFR SERPL CREATININE-BSD FRML MDRD: 118 ML/MIN/1.73
GLOBULIN UR ELPH-MCNC: 2.4 GM/DL
GLUCOSE SERPL-MCNC: 134 MG/DL (ref 65–99)
HCT VFR BLD AUTO: 38.3 % (ref 37.5–51)
HGB BLD-MCNC: 12.8 G/DL (ref 13–17.7)
HOLD SPECIMEN: NORMAL
IMM GRANULOCYTES # BLD AUTO: 0.03 10*3/MM3 (ref 0–0.05)
IMM GRANULOCYTES NFR BLD AUTO: 0.3 % (ref 0–0.5)
LIPASE SERPL-CCNC: 29 U/L (ref 13–60)
LYMPHOCYTES # BLD AUTO: 1.73 10*3/MM3 (ref 0.7–3.1)
LYMPHOCYTES NFR BLD AUTO: 18.3 % (ref 19.6–45.3)
MCH RBC QN AUTO: 30 PG (ref 26.6–33)
MCHC RBC AUTO-ENTMCNC: 33.4 G/DL (ref 31.5–35.7)
MCV RBC AUTO: 89.9 FL (ref 79–97)
MONOCYTES # BLD AUTO: 0.88 10*3/MM3 (ref 0.1–0.9)
MONOCYTES NFR BLD AUTO: 9.3 % (ref 5–12)
NEUTROPHILS NFR BLD AUTO: 6.43 10*3/MM3 (ref 1.7–7)
NEUTROPHILS NFR BLD AUTO: 67.9 % (ref 42.7–76)
NRBC BLD AUTO-RTO: 0 /100 WBC (ref 0–0.2)
NT-PROBNP SERPL-MCNC: 108.9 PG/ML (ref 0–900)
PLATELET # BLD AUTO: 226 10*3/MM3 (ref 140–450)
PMV BLD AUTO: 10.3 FL (ref 6–12)
POTASSIUM SERPL-SCNC: 3.3 MMOL/L (ref 3.5–5.2)
PROT SERPL-MCNC: 6.4 G/DL (ref 6–8.5)
QT INTERVAL: 394 MS
QT INTERVAL: 416 MS
QTC INTERVAL: 455 MS
QTC INTERVAL: 460 MS
RBC # BLD AUTO: 4.26 10*6/MM3 (ref 4.14–5.8)
SARS-COV-2 RNA RESP QL NAA+PROBE: NOT DETECTED
SODIUM SERPL-SCNC: 139 MMOL/L (ref 136–145)
TROPONIN T SERPL-MCNC: <0.01 NG/ML (ref 0–0.03)
TROPONIN T SERPL-MCNC: <0.01 NG/ML (ref 0–0.03)
WBC # BLD AUTO: 9.47 10*3/MM3 (ref 3.4–10.8)
WHOLE BLOOD HOLD SPECIMEN: NORMAL
WHOLE BLOOD HOLD SPECIMEN: NORMAL

## 2021-11-16 PROCEDURE — 85379 FIBRIN DEGRADATION QUANT: CPT | Performed by: EMERGENCY MEDICINE

## 2021-11-16 PROCEDURE — 36415 COLL VENOUS BLD VENIPUNCTURE: CPT

## 2021-11-16 PROCEDURE — 83690 ASSAY OF LIPASE: CPT | Performed by: EMERGENCY MEDICINE

## 2021-11-16 PROCEDURE — 84484 ASSAY OF TROPONIN QUANT: CPT | Performed by: EMERGENCY MEDICINE

## 2021-11-16 PROCEDURE — 93005 ELECTROCARDIOGRAM TRACING: CPT | Performed by: EMERGENCY MEDICINE

## 2021-11-16 PROCEDURE — 99284 EMERGENCY DEPT VISIT MOD MDM: CPT

## 2021-11-16 PROCEDURE — 85025 COMPLETE CBC W/AUTO DIFF WBC: CPT | Performed by: EMERGENCY MEDICINE

## 2021-11-16 PROCEDURE — 71045 X-RAY EXAM CHEST 1 VIEW: CPT

## 2021-11-16 PROCEDURE — 83880 ASSAY OF NATRIURETIC PEPTIDE: CPT | Performed by: EMERGENCY MEDICINE

## 2021-11-16 PROCEDURE — 80053 COMPREHEN METABOLIC PANEL: CPT | Performed by: EMERGENCY MEDICINE

## 2021-11-16 PROCEDURE — 94640 AIRWAY INHALATION TREATMENT: CPT

## 2021-11-16 PROCEDURE — 87636 SARSCOV2 & INF A&B AMP PRB: CPT | Performed by: EMERGENCY MEDICINE

## 2021-11-16 RX ORDER — ASPIRIN 81 MG/1
324 TABLET, CHEWABLE ORAL ONCE
Status: DISCONTINUED | OUTPATIENT
Start: 2021-11-16 | End: 2021-11-16

## 2021-11-16 RX ORDER — ALBUTEROL SULFATE 90 UG/1
2 AEROSOL, METERED RESPIRATORY (INHALATION) ONCE
Status: COMPLETED | OUTPATIENT
Start: 2021-11-16 | End: 2021-11-16

## 2021-11-16 RX ORDER — POTASSIUM CHLORIDE 750 MG/1
20 CAPSULE, EXTENDED RELEASE ORAL ONCE
Status: COMPLETED | OUTPATIENT
Start: 2021-11-16 | End: 2021-11-16

## 2021-11-16 RX ORDER — METHYLPREDNISOLONE SODIUM SUCCINATE 125 MG/2ML
125 INJECTION, POWDER, LYOPHILIZED, FOR SOLUTION INTRAMUSCULAR; INTRAVENOUS ONCE
Status: DISCONTINUED | OUTPATIENT
Start: 2021-11-16 | End: 2021-11-16

## 2021-11-16 RX ORDER — POTASSIUM CHLORIDE 750 MG/1
10 TABLET, FILM COATED, EXTENDED RELEASE ORAL 2 TIMES DAILY
Qty: 4 TABLET | Refills: 0 | Status: SHIPPED | OUTPATIENT
Start: 2021-11-16 | End: 2021-11-18

## 2021-11-16 RX ORDER — SODIUM CHLORIDE 0.9 % (FLUSH) 0.9 %
10 SYRINGE (ML) INJECTION AS NEEDED
Status: DISCONTINUED | OUTPATIENT
Start: 2021-11-16 | End: 2021-11-16 | Stop reason: HOSPADM

## 2021-11-16 RX ADMIN — ALBUTEROL SULFATE 2 PUFF: 90 AEROSOL, METERED RESPIRATORY (INHALATION) at 15:02

## 2021-11-16 RX ADMIN — POTASSIUM CHLORIDE 20 MEQ: 750 CAPSULE, EXTENDED RELEASE ORAL at 15:53

## 2021-11-16 NOTE — CONSULTS
[facility]         Date of Hospital Visit: 21      Place of Service: [facility]    Patient Name: Scott Milan  :1950    Referral Provider: ED physician  Primary Care Provider: Tigre Cates MD      Chief complaint/Reason for Consultation:  Chest Pain         Problem List:  Active Hospital Problems    Diagnosis    • **Recurrent chest pain    • Coronary artery disease            A.  [facility], 2015: ANALI to OM, 50% occlusion of mid RCA, 60-70% occlusion of LAD.        B.  scheduled functional assessment via left heart catheterization 3 weeks after intervention, patient did not keep appointment.        C.  recurrent chest pain with left heart catheterization, May 12, 2015: Drug-eluting stent to LAD, drug-eluting stent to the RCA.  EF 50%.     • Atrial fibrillation (HCC)      A. CHADS-2 score = 2  B. Symptomatic with dyspnea and palpitations  C. Currently on no anticoagulation  D. Status post BRANDON-guided cardioversion, 2014     • Hypertension    • Hypercholesterolemia    • COPD (chronic obstructive pulmonary disease) (HCC)    • Tobacco abuse        History of Present Illness:  This is a 71-year-old hypertensive dyslipidemic male smoker with known coronary artery disease, remote history of paroxysmal atrial fibrillation and COPD with ongoing tobacco abuse.  He was last evaluated by our service in May of this year at which time he was doing well.  He presented to the [facility] emergency department earlier today with a complaint of sudden onset of severe dyspnea similar to when he had his heart attack.  This was approximately 20 minutes after his apartment was sprayed for bugs by the .  He states he used his inhaler at home but that did not help.  In the emergency department he has had both a sublingual nitroglycerin and an albuterol treatment.  He is complaining of headache from the sublingual nitroglycerin and believes that the  albuterol treatment relieved his dyspnea.  His EKG is unremarkable and his first troponin is negative.  He states that his blood pressures at home typically run about 140 mmHg systolic.  States he has been using all of his medications as prescribed.  He continues to smoke.  He denies orthopnea, PND, claudication, lower extremity edema.  He has had no awareness of tachyarrhythmias, he denies dizziness or syncope.          Past Surgical History:   Procedure Laterality Date   • CORONARY ANGIOPLASTY WITH STENT PLACEMENT         Allergies   Allergen Reactions   • Effient [Prasugrel] Rash   • Clopidogrel    • Prednisone        Current Outpatient Medications   Medication Instructions   • amLODIPine (NORVASC) 5 mg, Oral, Daily, Need lab work and f/u appt for further refills.   • aspirin 81 mg, Oral, Daily   • atorvastatin (LIPITOR) 20 mg, Oral, Daily, Need lab work and f/u appt for further refills   • HYDROcodone-acetaminophen (NORCO) 7.5-325 MG per tablet 1 tablet, Oral, Every 6 Hours PRN   • sotalol (BETAPACE) 80 MG tablet TAKE ONE (1) TABLET BY MOUTH TWICE A DAY           Social History     Socioeconomic History   • Marital status:    Tobacco Use   • Smoking status: Current Every Day Smoker     Packs/day: 2.00     Types: Cigarettes   • Smokeless tobacco: Never Used   Substance and Sexual Activity   • Alcohol use: No   • Drug use: No   • Sexual activity: Defer       Family History   Problem Relation Age of Onset   • No Known Problems Mother    • No Known Problems Father        REVIEW OF SYSTEMS:   Review of Systems   Constitutional: Negative.   HENT: Negative.    Eyes: Negative.    Respiratory: Positive for shortness of breath.    Endocrine: Negative.    Hematologic/Lymphatic: Negative.    Skin: Negative.    Musculoskeletal: Negative.    Gastrointestinal: Negative.    Genitourinary: Negative.    Neurological: Negative.    Psychiatric/Behavioral: Negative.    Allergic/Immunologic: Negative.    All other systems  "reviewed and are negative.           Objective:  Vitals:    11/16/21 1341 11/16/21 1342 11/16/21 1430 11/16/21 1502   BP:  123/73 136/90    BP Location:  Right arm     Patient Position:  Lying     Pulse: 80  79 74   Resp: 15      Temp:  98 °F (36.7 °C)     TempSrc: Oral Oral     SpO2:  95% 92% 96%   Weight: 86.2 kg (190 lb)      Height: 177.8 cm (70\")        Body mass index is 27.26 kg/m².  Flowsheet Rows      First Filed Value   Admission Height 177.8 cm (70\") Documented at 11/16/2021 1341   Admission Weight 86.2 kg (190 lb) Documented at 11/16/2021 1341        No intake or output data in the 24 hours ending 11/16/21 1552    Vitals and nursing note reviewed.   Constitutional:       Appearance: Healthy appearance. Not in distress.   Eyes:      Conjunctiva/sclera: Conjunctivae normal.      Pupils: Pupils are equal, round, and reactive to light.   Neck:      Vascular: JVD normal.   Pulmonary:      Effort: Pulmonary effort is normal.      Breath sounds: Normal breath sounds.   Chest:      Chest wall: Not tender to palpatation.   Cardiovascular:      Normal rate. Regular rhythm.      Murmurs: There is no murmur.      No gallop. No click. No rub.   Pulses:     Intact distal pulses.   Edema:     Peripheral edema absent.   Abdominal:      General: Bowel sounds are normal.      Palpations: Abdomen is soft.   Musculoskeletal: Normal range of motion.         General: No deformity.      Cervical back: Normal range of motion. Skin:     General: Skin is warm and dry.   Neurological:      General: No focal deficit present.      Mental Status: Alert.         Lab Review:                CBC:      Lab 11/16/21  1341   WBC 9.47   HEMOGLOBIN 12.8*   HEMATOCRIT 38.3   PLATELETS 226   NEUTROS ABS 6.43   IMMATURE GRANS (ABS) 0.03   LYMPHS ABS 1.73   MONOS ABS 0.88   EOS ABS 0.32   MCV 89.9     CMP:        Lab 11/16/21  1404   SODIUM 139   POTASSIUM 3.3*   CHLORIDE 105   CO2 20.0*   ANION GAP 14.0   BUN 14   CREATININE 0.66*   GLUCOSE 134* "   CALCIUM 8.7   TOTAL PROTEIN 6.4   ALBUMIN 4.00   GLOBULIN 2.4   ALT (SGPT) 19   AST (SGOT) 15   BILIRUBIN 0.5   ALK PHOS 114   LIPASE 29         Estimated Creatinine Clearance: 100.3 mL/min (A) (by C-G formula based on SCr of 0.66 mg/dL (L)).  No results found for: DDIMER        CARDIAC LABS:      Lab 11/16/21  1404   PROBNP 108.9   TROPONIN T <0.010           EKG:         Result Review:  I have personally reviewed the results from the time of admission and agree with these findings:  [x]  Laboratory  [x]  Radiology  [x]  EKG/Telemetry   []  Pathology  [x]  Old records  []  Other:        Assessment and Plan:     1.  Sudden onset of severe dyspnea after exposure to insecticide in the setting of moderate to severe COPD   -Symptoms have significantly improved after albuterol treatment   -Follow-up with primary care   -Consider admission to hospitalist service if further treatment for COPD is felt necessary   -Consider pulmonology evaluation    2.  Coronary artery disease   -No current anginal symptoms   -Cannot completely rule out dyspnea as an anginal equivalent   -Patient declines outpatient stress test   -If recurrent symptoms consider increased antianginal regimen versus cardiac catheterization    3.  History of atrial fibrillation   -Maintaining sinus rhythm on sotalol    4.  Hypertension   -Overall well managed on current medical regimen   -Consider increasing amlodipine to 10 mg daily for improved systolic blood pressure and antianginal effect    Follow up with Dr. Mukherjee as an outpatient      Scribed for Ford Jacobson MD by Electronically signed by JORDYN Stack, 11/16/21, 3:46 PM EST.    Electronically signed by JORDYN Stack, 11/23/21, 10:57 AM EST.

## 2021-11-16 NOTE — ED PROVIDER NOTES
"Subjective   71-year-old male presents for evaluation of chest pain and shortness of breath.  Of note, the patient has a known history of coronary artery disease.  He is followed by Dr. Mukherjee of cardiology and his last heart cath was approximately 5 years ago.  He has had 3 prior stents.  This morning, just after 10 AM, the patient states that he was at home when the insect  came to his house and \"sprayed.\"  Shortly thereafter the patient began experiencing chest pain and shortness of breath.  He felt that it was most likely secondary to \"the fumes.\"  His symptoms persisted so he called EMS.  He received aspirin and nitroglycerin and was brought to our facility to be evaluated.  The patient has multiple other comorbidities as well including COPD.  He is not typically oxygen dependent.  He denies any sick contacts.  No known exposures to anyone with COVID-19.  Additionally, the patient is complaining of insomnia over the past 5 days and would like something to \"help him sleep.\"          Review of Systems   Respiratory: Positive for shortness of breath.    Cardiovascular: Positive for chest pain.   All other systems reviewed and are negative.      Past Medical History:   Diagnosis Date   • Allergic rhinitis     seasonal   • Atrial fibrillation (HCC)    • COPD (chronic obstructive pulmonary disease) (HCC)    • Coronary artery disease    • History of cardiac catheterization    • Hypercholesterolemia    • Hyperlipidemia    • Hypertension    • Recurrent chest pain    • Tobacco abuse        Allergies   Allergen Reactions   • Effient [Prasugrel] Rash   • Clopidogrel    • Prednisone        Past Surgical History:   Procedure Laterality Date   • CORONARY ANGIOPLASTY WITH STENT PLACEMENT         Family History   Problem Relation Age of Onset   • No Known Problems Mother    • No Known Problems Father        Social History     Socioeconomic History   • Marital status:    Tobacco Use   • Smoking status: Current " Every Day Smoker     Packs/day: 2.00     Types: Cigarettes   • Smokeless tobacco: Never Used   Substance and Sexual Activity   • Alcohol use: No   • Drug use: No   • Sexual activity: Defer           Objective   Physical Exam  Vitals and nursing note reviewed.   Constitutional:       General: He is not in acute distress.     Appearance: He is well-developed. He is not diaphoretic.      Comments: Chronically ill-appearing male   HENT:      Head: Normocephalic and atraumatic.   Neck:      Vascular: No JVD.   Cardiovascular:      Rate and Rhythm: Normal rate and regular rhythm.      Heart sounds: Normal heart sounds. No murmur heard.  No friction rub. No gallop.    Pulmonary:      Effort: Pulmonary effort is normal. No respiratory distress.      Breath sounds: Wheezing present. No rales.      Comments: Mild wheezing noted in posterior lung fields bilaterally, speaking in full sentences, no accessory muscle use or retractions noted  Abdominal:      General: Bowel sounds are normal. There is no distension.      Palpations: Abdomen is soft. There is no mass.      Tenderness: There is no abdominal tenderness. There is no guarding.   Musculoskeletal:         General: Normal range of motion.      Cervical back: Normal range of motion.      Right lower leg: No edema.      Left lower leg: No edema.   Skin:     General: Skin is warm and dry.      Coloration: Skin is not pale.      Findings: No erythema or rash.   Neurological:      General: No focal deficit present.      Mental Status: He is alert and oriented to person, place, and time.   Psychiatric:         Mood and Affect: Mood normal.         Thought Content: Thought content normal.         Judgment: Judgment normal.         Procedures           ED Course  ED Course as of 11/16/21 1751   Tue Nov 16, 2021   1354 71-year-old male presents for evaluation of chest pain and shortness of breath.  Of note, the patient has a known history of coronary artery disease.  He is followed  by Dr. Mukherjee and his last cath was approximately 5 years ago.  He has had 3 prior stents.  This morning, just after 10 AM, after the  sprayed in his home, he began experiencing chest pain and shortness of breath.  His symptoms persisted so he called EMS.  He was given aspirin and nitroglycerin and was brought to our facility to be evaluated.  On arrival, the patient is nontoxic-appearing.  He denies any current chest pain.  Exam remarkable for mild wheezing in posterior lung fields bilaterally.  Albuterol and steroids given for symptom relief. [DD]   1355 EKG revealed normal sinus rhythm with a heart rate of 82 and no ST segments suggestive of or concerning for ischemia.  We will obtain labs and imaging, and we will reassess following initial interventions. [DD]   1355 I personally viewed the patient's x-ray images myself, and I am in agreement with the radiologist's reading for final interpretation.     [DD]   1435 Low risk Well's and D dimer negative. [DD]   1512 Initial labs remarkable only for mild hypokalemia.  Potassium replaced orally. [DD]   1604 HEART score of 4. [DD]   1604 The patient was evaluated in the emergency department by Dr. Jacobson who felt comfortable managing the patient as an outpatient. I agree with his expert assessment. [DD]   1605 Upon reevaluation, the patient looks and feels much improved. His oxygen saturations are maintaining in the upper 90s. I feel that he is appropriate for outpatient management as well. [DD]   1751 Additionally, I encouraged the patient to use Benadryl as needed for his reported insomnia.  Short course of oral potassium given as well. [DD]      ED Course User Index  [DD] Jamie Bhandari MD                                   Recent Results (from the past 24 hour(s))   ECG 12 Lead    Collection Time: 11/16/21  1:36 PM   Result Value Ref Range    QT Interval 394 ms    QTC Interval 460 ms   Lavender Top    Collection Time: 11/16/21  1:41 PM   Result  Value Ref Range    Extra Tube hold for add-on    Gray Top    Collection Time: 11/16/21  1:41 PM   Result Value Ref Range    Extra Tube Hold for add-ons.    Light Blue Top    Collection Time: 11/16/21  1:41 PM   Result Value Ref Range    Extra Tube hold for add-on    CBC Auto Differential    Collection Time: 11/16/21  1:41 PM    Specimen: Blood   Result Value Ref Range    WBC 9.47 3.40 - 10.80 10*3/mm3    RBC 4.26 4.14 - 5.80 10*6/mm3    Hemoglobin 12.8 (L) 13.0 - 17.7 g/dL    Hematocrit 38.3 37.5 - 51.0 %    MCV 89.9 79.0 - 97.0 fL    MCH 30.0 26.6 - 33.0 pg    MCHC 33.4 31.5 - 35.7 g/dL    RDW 12.6 12.3 - 15.4 %    RDW-SD 41.2 37.0 - 54.0 fl    MPV 10.3 6.0 - 12.0 fL    Platelets 226 140 - 450 10*3/mm3    Neutrophil % 67.9 42.7 - 76.0 %    Lymphocyte % 18.3 (L) 19.6 - 45.3 %    Monocyte % 9.3 5.0 - 12.0 %    Eosinophil % 3.4 0.3 - 6.2 %    Basophil % 0.8 0.0 - 1.5 %    Immature Grans % 0.3 0.0 - 0.5 %    Neutrophils, Absolute 6.43 1.70 - 7.00 10*3/mm3    Lymphocytes, Absolute 1.73 0.70 - 3.10 10*3/mm3    Monocytes, Absolute 0.88 0.10 - 0.90 10*3/mm3    Eosinophils, Absolute 0.32 0.00 - 0.40 10*3/mm3    Basophils, Absolute 0.08 0.00 - 0.20 10*3/mm3    Immature Grans, Absolute 0.03 0.00 - 0.05 10*3/mm3    nRBC 0.0 0.0 - 0.2 /100 WBC   D-dimer, Quantitative    Collection Time: 11/16/21  1:41 PM    Specimen: Blood   Result Value Ref Range    D-Dimer, Quantitative 0.35 0.00 - 0.56 MCGFEU/mL   COVID-19 and FLU A/B PCR - Swab, Nasopharynx    Collection Time: 11/16/21  1:58 PM    Specimen: Nasopharynx; Swab   Result Value Ref Range    COVID19 Not Detected Not Detected - Ref. Range    Influenza A PCR Not Detected Not Detected    Influenza B PCR Not Detected Not Detected   Troponin    Collection Time: 11/16/21  2:04 PM    Specimen: Blood   Result Value Ref Range    Troponin T <0.010 0.000 - 0.030 ng/mL   Comprehensive Metabolic Panel    Collection Time: 11/16/21  2:04 PM    Specimen: Blood   Result Value Ref Range    Glucose  134 (H) 65 - 99 mg/dL    BUN 14 8 - 23 mg/dL    Creatinine 0.66 (L) 0.76 - 1.27 mg/dL    Sodium 139 136 - 145 mmol/L    Potassium 3.3 (L) 3.5 - 5.2 mmol/L    Chloride 105 98 - 107 mmol/L    CO2 20.0 (L) 22.0 - 29.0 mmol/L    Calcium 8.7 8.6 - 10.5 mg/dL    Total Protein 6.4 6.0 - 8.5 g/dL    Albumin 4.00 3.50 - 5.20 g/dL    ALT (SGPT) 19 1 - 41 U/L    AST (SGOT) 15 1 - 40 U/L    Alkaline Phosphatase 114 39 - 117 U/L    Total Bilirubin 0.5 0.0 - 1.2 mg/dL    eGFR Non African Amer 118 >60 mL/min/1.73    Globulin 2.4 gm/dL    A/G Ratio 1.7 g/dL    BUN/Creatinine Ratio 21.2 7.0 - 25.0    Anion Gap 14.0 5.0 - 15.0 mmol/L   Lipase    Collection Time: 11/16/21  2:04 PM    Specimen: Blood   Result Value Ref Range    Lipase 29 13 - 60 U/L   BNP    Collection Time: 11/16/21  2:04 PM    Specimen: Blood   Result Value Ref Range    proBNP 108.9 0.0 - 900.0 pg/mL   Green Top (Gel)    Collection Time: 11/16/21  2:04 PM   Result Value Ref Range    Extra Tube Hold for add-ons.    Gold Top - SST    Collection Time: 11/16/21  2:04 PM   Result Value Ref Range    Extra Tube Hold for add-ons.    Troponin    Collection Time: 11/16/21  4:00 PM    Specimen: Blood   Result Value Ref Range    Troponin T <0.010 0.000 - 0.030 ng/mL   ECG 12 Lead    Collection Time: 11/16/21  4:02 PM   Result Value Ref Range    QT Interval 416 ms    QTC Interval 455 ms     Note: In addition to lab results from this visit, the labs listed above may include labs taken at another facility or during a different encounter within the last 24 hours. Please correlate lab times with ED admission and discharge times for further clarification of the services performed during this visit.    XR Chest 1 View   Preliminary Result   Minimal bibasilar atelectasis. No other evidence of active   chest disease is seen.        D: 11/16/2021   E: 11/16/2021            Vitals:    11/16/21 1430 11/16/21 1502 11/16/21 1555 11/16/21 1654   BP: 136/90  131/73 131/73   BP Location:   Right  arm Right arm   Patient Position:   Lying Lying   Pulse: 79 74 96    Resp:   16    Temp:       TempSrc:       SpO2: 92% 96% 95%    Weight:       Height:         Medications   sodium chloride 0.9 % flush 10 mL (has no administration in time range)   albuterol sulfate HFA (PROVENTIL HFA;VENTOLIN HFA;PROAIR HFA) inhaler 2 puff (2 puffs Inhalation Given 11/16/21 1502)   potassium chloride (MICRO-K) CR capsule 20 mEq (20 mEq Oral Given 11/16/21 1553)     ECG/EMG Results (last 24 hours)     Procedure Component Value Units Date/Time    ECG 12 Lead [[id number]] Collected: 11/16/21 1336     Updated: 11/16/21 1605     QT Interval 394 ms      QTC Interval 460 ms     Narrative:      Test Reason : chest pain  Blood Pressure :   */*   mmHG  Vent. Rate :  82 BPM     Atrial Rate :  82 BPM     P-R Int : 162 ms          QRS Dur :  82 ms      QT Int : 394 ms       P-R-T Axes :  57  35  36 degrees     QTc Int : 460 ms    Normal sinus rhythm  Normal ECG  When compared with ECG of 22-JUN-2017 00:05,  No significant change was found  Confirmed by MD Bhandari Michael [id number]) on 11/16/2021 4:05:38 PM    Referred By: ED           Confirmed By: Paul Bhandari MD    ECG 12 Lead [[id number]] Collected: 11/16/21 1602     Updated: 11/16/21 1605     QT Interval 416 ms      QTC Interval 455 ms     Narrative:      Test Reason : chest pain  Blood Pressure :   */*   mmHG  Vent. Rate :  72 BPM     Atrial Rate :  72 BPM     P-R Int : 162 ms          QRS Dur :  86 ms      QT Int : 416 ms       P-R-T Axes :  70  58  58 degrees     QTc Int : 455 ms    Normal sinus rhythm  Normal ECG  When compared with ECG of 16-NOV-2021 13:36, (Unconfirmed)  No significant change was found  Confirmed by MD Bhandari Michael ([id number]) on 11/16/2021 4:05:50 PM    Referred By: EDMD           Confirmed By: Paul Bhandari MD        ECG 12 Lead   Final Result   Test Reason : chest pain   Blood Pressure :   */*   mmHG   Vent. Rate :  72 BPM     Atrial Rate :  72 BPM      P-R Int : 162 ms           QRS Dur :  86 ms       QT Int : 416 ms       P-R-T Axes :  70  58  58 degrees      QTc Int : 455 ms      Normal sinus rhythm   Normal ECG   When compared with ECG of 16-NOV-2021 13:36, (Unconfirmed)   No significant change was found   Confirmed by MD Bhandari Michael ([id number]) on 11/16/2021 4:05:50 PM      Referred By: EDMD           Confirmed By: Paul Bhandari MD      ECG 12 Lead   Final Result   Test Reason : chest pain   Blood Pressure :   */*   mmHG   Vent. Rate :  82 BPM     Atrial Rate :  82 BPM      P-R Int : 162 ms          QRS Dur :  82 ms       QT Int : 394 ms       P-R-T Axes :  57  35  36 degrees      QTc Int : 460 ms      Normal sinus rhythm   Normal ECG   When compared with ECG of 22-JUN-2017 00:05,   No significant change was found   Confirmed by MD Bhandari Michael ([id number]) on 11/16/2021 4:05:38 PM      Referred By: ED           Confirmed By: Paul Bhandari MD                  [facility]    Final diagnoses:   Chest pain, unspecified type   History of COPD   History of coronary artery disease   Hypokalemia   Inhalation injury   Other insomnia       ED Disposition  ED Disposition     ED Disposition Condition Comment    Discharge Stable           Tigre Cates MD  [address] [address]  [phone]      1-2 weeks    [facility] CARDIOLOGY  [address] [facility]  [facility] [id number] [phone]  In 3 days           Medication List      New Prescriptions    potassium chloride 10 MEQ CR tablet  Take 1 tablet by mouth 2 (Two) Times a Day for 2 days.           Where to Get Your Medications      [facility] - [place], [place] - [address] [place] [address] - [phone]  - [phone] FX  [address] [place] [address]    Phone: [phone]   · potassium chloride 10 MEQ CR tablet          Jamie Bhandari MD  11/16/21 9495

## 2022-01-26 ENCOUNTER — HOSPITAL ENCOUNTER (EMERGENCY)
Facility: HOSPITAL | Age: 72
Discharge: HOME | End: 2022-01-26
Attending: EMERGENCY MEDICINE | Admitting: EMERGENCY MEDICINE

## 2022-01-26 ENCOUNTER — APPOINTMENT (OUTPATIENT)
Dept: CT IMAGING | Facility: HOSPITAL | Age: 72
End: 2022-01-26

## 2022-01-26 VITALS
OXYGEN SATURATION: 93 % | SYSTOLIC BLOOD PRESSURE: 127 MMHG | TEMPERATURE: 98.1 F | WEIGHT: 185 LBS | RESPIRATION RATE: 18 BRPM | BODY MASS INDEX: 26.48 KG/M2 | HEIGHT: 70 IN | DIASTOLIC BLOOD PRESSURE: 79 MMHG | HEART RATE: 91 BPM

## 2022-01-26 DIAGNOSIS — N23 RENAL COLIC ON LEFT SIDE: ICD-10-CM

## 2022-01-26 DIAGNOSIS — N20.1 LEFT URETERAL STONE: ICD-10-CM

## 2022-01-26 DIAGNOSIS — R10.9 ACUTE LEFT FLANK PAIN: Primary | ICD-10-CM

## 2022-01-26 LAB
ALBUMIN SERPL-MCNC: 4 G/DL (ref 3.5–5.2)
ALBUMIN/GLOB SERPL: 1.3 G/DL
ALP SERPL-CCNC: 113 U/L (ref 39–117)
ALT SERPL W P-5'-P-CCNC: 11 U/L (ref 1–41)
ANION GAP SERPL CALCULATED.3IONS-SCNC: 14 MMOL/L (ref 5–15)
AST SERPL-CCNC: 10 U/L (ref 1–40)
BACTERIA UR QL AUTO: NORMAL /HPF
BASOPHILS # BLD AUTO: 0.02 10*3/MM3 (ref 0–0.2)
BASOPHILS NFR BLD AUTO: 0.2 % (ref 0–1.5)
BILIRUB SERPL-MCNC: 0.8 MG/DL (ref 0–1.2)
BILIRUB UR QL STRIP: NEGATIVE
BUN SERPL-MCNC: 17 MG/DL (ref 8–23)
BUN/CREAT SERPL: 13.7 (ref 7–25)
CALCIUM SPEC-SCNC: 9.2 MG/DL (ref 8.6–10.5)
CHLORIDE SERPL-SCNC: 98 MMOL/L (ref 98–107)
CLARITY UR: CLEAR
CO2 SERPL-SCNC: 24 MMOL/L (ref 22–29)
COLOR UR: YELLOW
CREAT SERPL-MCNC: 1.24 MG/DL (ref 0.76–1.27)
D-LACTATE SERPL-SCNC: 1.3 MMOL/L (ref 0.5–2)
DEPRECATED RDW RBC AUTO: 40.3 FL (ref 37–54)
EOSINOPHIL # BLD AUTO: 0.11 10*3/MM3 (ref 0–0.4)
EOSINOPHIL NFR BLD AUTO: 0.9 % (ref 0.3–6.2)
ERYTHROCYTE [DISTWIDTH] IN BLOOD BY AUTOMATED COUNT: 12.3 % (ref 12.3–15.4)
GFR SERPL CREATININE-BSD FRML MDRD: 57 ML/MIN/1.73
GLOBULIN UR ELPH-MCNC: 3.2 GM/DL
GLUCOSE SERPL-MCNC: 146 MG/DL (ref 65–99)
GLUCOSE UR STRIP-MCNC: NEGATIVE MG/DL
HCT VFR BLD AUTO: 37.3 % (ref 37.5–51)
HGB BLD-MCNC: 12.4 G/DL (ref 13–17.7)
HGB UR QL STRIP.AUTO: NEGATIVE
HOLD SPECIMEN: NORMAL
HYALINE CASTS UR QL AUTO: NORMAL /LPF
IMM GRANULOCYTES # BLD AUTO: 0.07 10*3/MM3 (ref 0–0.05)
IMM GRANULOCYTES NFR BLD AUTO: 0.6 % (ref 0–0.5)
KETONES UR QL STRIP: ABNORMAL
LEUKOCYTE ESTERASE UR QL STRIP.AUTO: NEGATIVE
LIPASE SERPL-CCNC: 14 U/L (ref 13–60)
LYMPHOCYTES # BLD AUTO: 1.1 10*3/MM3 (ref 0.7–3.1)
LYMPHOCYTES NFR BLD AUTO: 8.8 % (ref 19.6–45.3)
MCH RBC QN AUTO: 29.5 PG (ref 26.6–33)
MCHC RBC AUTO-ENTMCNC: 33.2 G/DL (ref 31.5–35.7)
MCV RBC AUTO: 88.8 FL (ref 79–97)
MONOCYTES # BLD AUTO: 1.19 10*3/MM3 (ref 0.1–0.9)
MONOCYTES NFR BLD AUTO: 9.5 % (ref 5–12)
NEUTROPHILS NFR BLD AUTO: 80 % (ref 42.7–76)
NEUTROPHILS NFR BLD AUTO: 9.99 10*3/MM3 (ref 1.7–7)
NITRITE UR QL STRIP: NEGATIVE
NRBC BLD AUTO-RTO: 0 /100 WBC (ref 0–0.2)
PH UR STRIP.AUTO: 5.5 [PH] (ref 5–8)
PLATELET # BLD AUTO: 310 10*3/MM3 (ref 140–450)
PMV BLD AUTO: 10 FL (ref 6–12)
POTASSIUM SERPL-SCNC: 3.2 MMOL/L (ref 3.5–5.2)
PROT SERPL-MCNC: 7.2 G/DL (ref 6–8.5)
PROT UR QL STRIP: ABNORMAL
RBC # BLD AUTO: 4.2 10*6/MM3 (ref 4.14–5.8)
RBC # UR STRIP: NORMAL /HPF
REF LAB TEST METHOD: NORMAL
SODIUM SERPL-SCNC: 136 MMOL/L (ref 136–145)
SP GR UR STRIP: 1.02 (ref 1–1.03)
SQUAMOUS #/AREA URNS HPF: NORMAL /HPF
UROBILINOGEN UR QL STRIP: ABNORMAL
WBC # UR STRIP: NORMAL /HPF
WBC NRBC COR # BLD: 12.48 10*3/MM3 (ref 3.4–10.8)
WHOLE BLOOD HOLD SPECIMEN: NORMAL
WHOLE BLOOD HOLD SPECIMEN: NORMAL

## 2022-01-26 PROCEDURE — 74176 CT ABD & PELVIS W/O CONTRAST: CPT

## 2022-01-26 PROCEDURE — 80053 COMPREHEN METABOLIC PANEL: CPT | Performed by: EMERGENCY MEDICINE

## 2022-01-26 PROCEDURE — 36415 COLL VENOUS BLD VENIPUNCTURE: CPT

## 2022-01-26 PROCEDURE — 25010000002 ONDANSETRON PER 1 MG: Performed by: EMERGENCY MEDICINE

## 2022-01-26 PROCEDURE — 25010000002 HYDROMORPHONE PER 4 MG: Performed by: EMERGENCY MEDICINE

## 2022-01-26 PROCEDURE — 81001 URINALYSIS AUTO W/SCOPE: CPT | Performed by: EMERGENCY MEDICINE

## 2022-01-26 PROCEDURE — 96375 TX/PRO/DX INJ NEW DRUG ADDON: CPT

## 2022-01-26 PROCEDURE — 83605 ASSAY OF LACTIC ACID: CPT | Performed by: EMERGENCY MEDICINE

## 2022-01-26 PROCEDURE — 85025 COMPLETE CBC W/AUTO DIFF WBC: CPT | Performed by: EMERGENCY MEDICINE

## 2022-01-26 PROCEDURE — 96376 TX/PRO/DX INJ SAME DRUG ADON: CPT

## 2022-01-26 PROCEDURE — 99283 EMERGENCY DEPT VISIT LOW MDM: CPT

## 2022-01-26 PROCEDURE — 96374 THER/PROPH/DIAG INJ IV PUSH: CPT

## 2022-01-26 PROCEDURE — 83690 ASSAY OF LIPASE: CPT | Performed by: EMERGENCY MEDICINE

## 2022-01-26 PROCEDURE — 25010000002 KETOROLAC TROMETHAMINE PER 15 MG: Performed by: PHYSICIAN ASSISTANT

## 2022-01-26 RX ORDER — ONDANSETRON 2 MG/ML
4 INJECTION INTRAMUSCULAR; INTRAVENOUS ONCE
Status: COMPLETED | OUTPATIENT
Start: 2022-01-26 | End: 2022-01-26

## 2022-01-26 RX ORDER — HYDROMORPHONE HYDROCHLORIDE 1 MG/ML
0.25 INJECTION, SOLUTION INTRAMUSCULAR; INTRAVENOUS; SUBCUTANEOUS
Status: DISCONTINUED | OUTPATIENT
Start: 2022-01-26 | End: 2022-01-26 | Stop reason: HOSPADM

## 2022-01-26 RX ORDER — SODIUM CHLORIDE 9 MG/ML
10 INJECTION INTRAVENOUS AS NEEDED
Status: DISCONTINUED | OUTPATIENT
Start: 2022-01-26 | End: 2022-01-26 | Stop reason: HOSPADM

## 2022-01-26 RX ORDER — HYDROCODONE BITARTRATE AND ACETAMINOPHEN 7.5; 325 MG/1; MG/1
1 TABLET ORAL EVERY 6 HOURS PRN
Qty: 12 TABLET | Refills: 0 | Status: SHIPPED | OUTPATIENT
Start: 2022-01-26 | End: 2022-01-26 | Stop reason: SDUPTHER

## 2022-01-26 RX ORDER — HYDROCODONE BITARTRATE AND ACETAMINOPHEN 7.5; 325 MG/1; MG/1
1 TABLET ORAL EVERY 6 HOURS PRN
Qty: 12 TABLET | Refills: 0 | Status: SHIPPED | OUTPATIENT
Start: 2022-01-26

## 2022-01-26 RX ORDER — ONDANSETRON 4 MG/1
4 TABLET, ORALLY DISINTEGRATING ORAL EVERY 8 HOURS PRN
Qty: 12 TABLET | Refills: 0 | Status: SHIPPED | OUTPATIENT
Start: 2022-01-26

## 2022-01-26 RX ORDER — KETOROLAC TROMETHAMINE 30 MG/ML
15 INJECTION, SOLUTION INTRAMUSCULAR; INTRAVENOUS ONCE
Status: COMPLETED | OUTPATIENT
Start: 2022-01-26 | End: 2022-01-26

## 2022-01-26 RX ADMIN — HYDROMORPHONE HYDROCHLORIDE 0.25 MG: 1 INJECTION, SOLUTION INTRAMUSCULAR; INTRAVENOUS; SUBCUTANEOUS at 16:02

## 2022-01-26 RX ADMIN — KETOROLAC TROMETHAMINE 15 MG: 30 INJECTION, SOLUTION INTRAMUSCULAR; INTRAVENOUS at 17:49

## 2022-01-26 RX ADMIN — HYDROMORPHONE HYDROCHLORIDE 0.25 MG: 1 INJECTION, SOLUTION INTRAMUSCULAR; INTRAVENOUS; SUBCUTANEOUS at 14:52

## 2022-01-26 RX ADMIN — ONDANSETRON 4 MG: 2 INJECTION INTRAMUSCULAR; INTRAVENOUS at 14:52

## 2022-06-27 ENCOUNTER — OFFICE VISIT (OUTPATIENT)
Dept: CARDIOLOGY | Facility: CLINIC | Age: 72
End: 2022-06-27

## 2022-06-27 VITALS
OXYGEN SATURATION: 95 % | HEIGHT: 70 IN | BODY MASS INDEX: 26.2 KG/M2 | WEIGHT: 183 LBS | SYSTOLIC BLOOD PRESSURE: 116 MMHG | HEART RATE: 88 BPM | DIASTOLIC BLOOD PRESSURE: 60 MMHG

## 2022-06-27 DIAGNOSIS — I25.10 CORONARY ARTERY DISEASE INVOLVING NATIVE CORONARY ARTERY OF NATIVE HEART WITHOUT ANGINA PECTORIS: ICD-10-CM

## 2022-06-27 DIAGNOSIS — E78.00 HYPERCHOLESTEROLEMIA: ICD-10-CM

## 2022-06-27 DIAGNOSIS — I48.0 PAROXYSMAL ATRIAL FIBRILLATION: Primary | ICD-10-CM

## 2022-06-27 PROCEDURE — 93000 ELECTROCARDIOGRAM COMPLETE: CPT | Performed by: INTERNAL MEDICINE

## 2022-06-27 PROCEDURE — 99214 OFFICE O/P EST MOD 30 MIN: CPT | Performed by: INTERNAL MEDICINE

## 2022-06-27 RX ORDER — TIOTROPIUM BROMIDE 18 UG/1
CAPSULE ORAL; RESPIRATORY (INHALATION) AS NEEDED
COMMUNITY
Start: 2022-06-06

## 2022-06-27 RX ORDER — ROSUVASTATIN CALCIUM 20 MG/1
20 TABLET, COATED ORAL DAILY
Qty: 90 TABLET | Refills: 1 | Status: SHIPPED | OUTPATIENT
Start: 2022-06-27

## 2022-06-27 NOTE — PROGRESS NOTES
[facility]  Office visit  Scott Milan  1950  [phone]    VISIT DATE:  6/27/2022      PCP: Tigre Cates MD  [address] [place] [address]    CC:  Chief Complaint   Patient presents with   • Atrial Fibrillation       PROBLEM LIST:  1.  Coronary artery disease                        A.  [facility], February 20, 2015: ANALI to OM, 50% occlusion of mid RCA, 60-70% occlusion of LAD.                        B.  scheduled functional assessment via left heart catheterization 3 weeks after intervention, patient did not keep appointment.                        C.  recurrent chest pain with left heart catheterization May 12, 2015: Drug-eluting stent to LAD, drug-eluting stent to the RCA.  EF 50%.  2.  Essential hypertension  3.  Dyslipidemia  4.  COPD with ongoing tobacco abuse  5.  History of ventricular fibrillation with arrest, 2012  6.  Family history precocious CAD     ASSESSMENT:   Diagnosis Plan   1. Paroxysmal atrial fibrillation (HCC)     2. Coronary artery disease involving native coronary artery of native heart without angina pectoris     3. Hypercholesterolemia         PLAN:  Paroxysmal atrial fibrillation: Currently stable and asymptomatic.  Chads Vascor equal to 3, previously intolerant anticoagulation.  Continue aspirin.  Continue sotalol 80 mg by mouth twice a day.    Coronary artery disease: Stable.  Continue aspirin and statin therapy.    Hyperlipidemia: Goal LDL less than 100.  Discontinue atorvastatin, starting rosuvastatin 20 mg p.o. daily.    Hypertension: Goal less than 130/80 mmHg.  Continue amlodipine 5 mg by mouth daily.    Nicotine abuse: Counseled on need for smoking cessation.  Currently not ready to quit.    Subjective  Currently denies chest pain, limiting dyspnea on exertion or palpitations. Xarelto caused lightheadedness and stopped taking it.   Denies easy bruising or bleeding complications. Compliant with sotalol.  Currently smoking about 1-1-1/2  "packs/day.  Has not been taking his atorvastatin recently due to nocturnal myalgias.    PHYSICAL EXAMINATION:  Vitals:    06/27/22 1324   BP: 116/60   BP Location: Right arm   Patient Position: Sitting   Pulse: 88   SpO2: 95%   Weight: 83 kg (183 lb)   Height: 177.8 cm (70\")     General Appearance:    Alert, cooperative, no distress, appears stated age   Head:    Normocephalic, without obvious abnormality, atraumatic   Eyes:    conjunctiva/corneas clear   Nose:   Nares normal, septum midline, mucosa normal, no drainage   Throat:   Lips, teeth and gums normal   Neck:   Supple, symmetrical, trachea midline, no carotid    bruit or JVD   Lungs:     Clear to auscultation bilaterally, respirations unlabored   Chest Wall:    No tenderness or deformity    Heart:    Regular rate and rhythm, S1 and S2 normal, no murmur, rub   or gallop, normal carotid impulse bilaterally without bruit.   Abdomen:     Soft, non-tender   Extremities:   Extremities normal, atraumatic, no cyanosis or edema   Pulses:   2+ and symmetric all extremities   Skin:   Skin color, texture, turgor normal, no rashes or lesions       Diagnostic Data:    ECG 12 Lead    Date/Time: 6/27/2022 1:37 PM  Performed by: Sai Mukherjee III, MD  Authorized by: Sai Mukherjee III, MD   Comparison: compared with previous ECG from 11/16/2021  Similar to previous ECG  Rhythm: sinus rhythm    Clinical impression: normal ECG          Lab Results   Component Value Date    CHLPL 87 05/12/2015    TRIG 114 05/12/2015    HDL 25 (L) 05/12/2015     Lab Results   Component Value Date    GLUCOSE 146 (H) 01/26/2022    BUN 17 01/26/2022    CREATININE 1.24 01/26/2022     01/26/2022    K 3.2 (L) 01/26/2022    CL 98 01/26/2022    CO2 24.0 01/26/2022     Lab Results   Component Value Date    HGBA1C 5.8 05/12/2015     Lab Results   Component Value Date    WBC 12.48 (H) 01/26/2022    HGB 12.4 (L) 01/26/2022    HCT 37.3 (L) 01/26/2022     01/26/2022       Allergies  Allergies "   Allergen Reactions   • Effient [Prasugrel] Rash   • Clopidogrel    • Prednisone        Current Medications    Current Outpatient Medications:   •  amLODIPine (NORVASC) 5 MG tablet, Take 1 tablet by mouth Daily. Need lab work and f/u appt for further refills., Disp: 90 tablet, Rfl: 0  •  aspirin 81 MG tablet, Take 81 mg by mouth Daily., Disp: , Rfl:   •  HYDROcodone-acetaminophen (NORCO) 7.5-325 MG per tablet, Take 1 tablet by mouth Every 6 (Six) Hours As Needed for Moderate Pain  or Severe Pain ., Disp: 12 tablet, Rfl: 0  •  metFORMIN (GLUCOPHAGE) 500 MG tablet, Take 500 mg by mouth 2 (Two) Times a Day., Disp: , Rfl:   •  ondansetron ODT (ZOFRAN-ODT) 4 MG disintegrating tablet, Place 1 tablet on the tongue Every 8 (Eight) Hours As Needed for Nausea or Vomiting., Disp: 12 tablet, Rfl: 0  •  sotalol (BETAPACE) 80 MG tablet, TAKE ONE (1) TABLET BY MOUTH TWICE A DAY , Disp: 180 tablet, Rfl: 0  •  Spiriva HandiHaler 18 MCG per inhalation capsule, As Needed., Disp: , Rfl:   •  rosuvastatin (CRESTOR) 20 MG tablet, Take 1 tablet by mouth Daily., Disp: 90 tablet, Rfl: 1          ROS  Review of Systems   Cardiovascular: Negative for chest pain, claudication and dyspnea on exertion.   Respiratory: Negative for cough and shortness of breath.          SOCIAL HX  Social History     Socioeconomic History   • Marital status:    Tobacco Use   • Smoking status: Current Every Day Smoker     Packs/day: 2.00     Types: Cigarettes   • Smokeless tobacco: Never Used   Substance and Sexual Activity   • Alcohol use: No   • Drug use: No   • Sexual activity: Defer       FAMILY HX  Family History   Problem Relation Age of Onset   • No Known Problems Mother    • No Known Problems Father              Sai Mukherjee III, MD, FACC

## 2023-01-18 ENCOUNTER — OFFICE VISIT (OUTPATIENT)
Dept: CARDIOLOGY | Facility: CLINIC | Age: 73
End: 2023-01-18
Payer: MEDICARE

## 2023-01-18 VITALS
SYSTOLIC BLOOD PRESSURE: 146 MMHG | DIASTOLIC BLOOD PRESSURE: 72 MMHG | HEART RATE: 81 BPM | BODY MASS INDEX: 27.32 KG/M2 | HEIGHT: 70 IN | WEIGHT: 190.8 LBS | OXYGEN SATURATION: 95 %

## 2023-01-18 DIAGNOSIS — E78.00 HYPERCHOLESTEROLEMIA: ICD-10-CM

## 2023-01-18 DIAGNOSIS — I25.10 CORONARY ARTERY DISEASE INVOLVING NATIVE CORONARY ARTERY OF NATIVE HEART WITHOUT ANGINA PECTORIS: ICD-10-CM

## 2023-01-18 DIAGNOSIS — I48.0 PAROXYSMAL ATRIAL FIBRILLATION: Primary | ICD-10-CM

## 2023-01-18 DIAGNOSIS — I10 PRIMARY HYPERTENSION: ICD-10-CM

## 2023-01-18 PROCEDURE — 99214 OFFICE O/P EST MOD 30 MIN: CPT | Performed by: INTERNAL MEDICINE

## 2023-01-18 RX ORDER — ALBUTEROL SULFATE 90 UG/1
AEROSOL, METERED RESPIRATORY (INHALATION) AS NEEDED
COMMUNITY
Start: 2022-11-03

## 2023-01-18 RX ORDER — TAMSULOSIN HYDROCHLORIDE 0.4 MG/1
CAPSULE ORAL DAILY
COMMUNITY
Start: 2022-11-03

## 2023-01-18 NOTE — PROGRESS NOTES
[facility]  Office visit  Scott Milan  1950  [phone]    VISIT DATE:  1/18/2023      PCP: Tigre Cates MD  [address] [place] [address]    CC:  Chief Complaint   Patient presents with   • Paroxysmal atrial fibrillation (HCC)       PROBLEM LIST:  1.  Coronary artery disease                        A.  [facility], February 20, 2015: ANALI to OM, 50% occlusion of mid RCA, 60-70% occlusion of LAD.                        B.  scheduled functional assessment via left heart catheterization 3 weeks after intervention, patient did not keep appointment.                        C.  recurrent chest pain with left heart catheterization May 12, 2015: Drug-eluting stent to LAD, drug-eluting stent to the RCA.  EF 50%.  2.  Essential hypertension  3.  Dyslipidemia  4.  COPD with ongoing tobacco abuse  5.  History of ventricular fibrillation with arrest, 2012  6.  Family history precocious CAD     ASSESSMENT:   Diagnosis Plan   1. Paroxysmal atrial fibrillation (HCC)        2. Coronary artery disease involving native coronary artery of native heart without angina pectoris        3. Hypercholesterolemia        4. Primary hypertension            PLAN:  Paroxysmal atrial fibrillation: Currently stable and asymptomatic.  Chads Vascor equal to 3, previously intolerant anticoagulation.  Continue aspirin.  Continue sotalol 80 mg by mouth twice a day.    Coronary artery disease: Stable.  Continue aspirin and statin therapy.    Hyperlipidemia: Goal LDL less than 100.  Continue rosuvastatin 20 mg p.o. daily.    Hypertension: Goal less than 130/80 mmHg.  Continue amlodipine 5 mg by mouth daily.    Nicotine abuse: Counseled on need for smoking cessation.  Currently not ready to quit.    Subjective  Currently denies chest pain, limiting dyspnea on exertion or palpitations. Xarelto caused lightheadedness and stopped taking it.   Denies easy bruising or bleeding complications. Compliant with sotalol.   "Currently smoking about 1 pack/day.      PHYSICAL EXAMINATION:  Vitals:    01/18/23 1408   BP: 146/72   BP Location: Left arm   Patient Position: Sitting   Pulse: 81   SpO2: 95%   Weight: 86.5 kg (190 lb 12.8 oz)   Height: 177.8 cm (70\")     General Appearance:    Alert, cooperative, no distress, appears stated age   Head:    Normocephalic, without obvious abnormality, atraumatic   Eyes:    conjunctiva/corneas clear   Nose:   Nares normal, septum midline, mucosa normal, no drainage   Throat:   Lips, teeth and gums normal   Neck:   Supple, symmetrical, trachea midline, no carotid    bruit or JVD   Lungs:     Clear to auscultation bilaterally, respirations unlabored   Chest Wall:    No tenderness or deformity    Heart:    Regular rate and rhythm, S1 and S2 normal, no murmur, rub   or gallop, normal carotid impulse bilaterally without bruit.   Abdomen:     Soft, non-tender   Extremities:   Extremities normal, atraumatic, no cyanosis or edema   Pulses:   2+ and symmetric all extremities   Skin:   Skin color, texture, turgor normal, no rashes or lesions       Diagnostic Data:  Procedures  Lab Results   Component Value Date    CHLPL 87 05/12/2015    TRIG 114 05/12/2015    HDL 25 (L) 05/12/2015     Lab Results   Component Value Date    GLUCOSE 146 (H) 01/26/2022    BUN 17 01/26/2022    CREATININE 1.24 01/26/2022     01/26/2022    K 3.2 (L) 01/26/2022    CL 98 01/26/2022    CO2 24.0 01/26/2022     Lab Results   Component Value Date    HGBA1C 5.8 05/12/2015     Lab Results   Component Value Date    WBC 12.48 (H) 01/26/2022    HGB 12.4 (L) 01/26/2022    HCT 37.3 (L) 01/26/2022     01/26/2022       Allergies  Allergies   Allergen Reactions   • Effient [Prasugrel] Rash   • Clopidogrel    • Prednisone        Current Medications    Current Outpatient Medications:   •  albuterol sulfate  (90 Base) MCG/ACT inhaler, As Needed., Disp: , Rfl:   •  amLODIPine (NORVASC) 5 MG tablet, Take 1 tablet by mouth Daily. Need " lab work and f/u appt for further refills., Disp: 90 tablet, Rfl: 0  •  aspirin 81 MG tablet, Take 81 mg by mouth Daily., Disp: , Rfl:   •  HYDROcodone-acetaminophen (NORCO) 7.5-325 MG per tablet, Take 1 tablet by mouth Every 6 (Six) Hours As Needed for Moderate Pain  or Severe Pain ., Disp: 12 tablet, Rfl: 0  •  metFORMIN (GLUCOPHAGE) 500 MG tablet, Take 500 mg by mouth 2 (Two) Times a Day., Disp: , Rfl:   •  ondansetron ODT (ZOFRAN-ODT) 4 MG disintegrating tablet, Place 1 tablet on the tongue Every 8 (Eight) Hours As Needed for Nausea or Vomiting., Disp: 12 tablet, Rfl: 0  •  rosuvastatin (CRESTOR) 20 MG tablet, Take 1 tablet by mouth Daily., Disp: 90 tablet, Rfl: 1  •  sotalol (BETAPACE) 80 MG tablet, TAKE ONE (1) TABLET BY MOUTH TWICE A DAY , Disp: 180 tablet, Rfl: 0  •  Spiriva HandiHaler 18 MCG per inhalation capsule, As Needed., Disp: , Rfl:   •  tamsulosin (FLOMAX) 0.4 MG capsule 24 hr capsule, Daily., Disp: , Rfl:           ROS  Review of Systems   Cardiovascular: Negative for chest pain, claudication and dyspnea on exertion.   Respiratory: Negative for cough and shortness of breath.          SOCIAL HX  Social History     Socioeconomic History   • Marital status:    Tobacco Use   • Smoking status: Every Day     Packs/day: 2.00     Types: Cigarettes   • Smokeless tobacco: Never   Vaping Use   • Vaping Use: Never used   Substance and Sexual Activity   • Alcohol use: No   • Drug use: No   • Sexual activity: Defer       FAMILY HX  Family History   Problem Relation Age of Onset   • No Known Problems Mother    • No Known Problems Father              Sai Mukherjee III, MD, [facility]

## 2023-08-11 ENCOUNTER — APPOINTMENT (OUTPATIENT)
Dept: GENERAL RADIOLOGY | Facility: HOSPITAL | Age: 73
DRG: 247 | End: 2023-08-11
Payer: MEDICARE

## 2023-08-11 ENCOUNTER — HOSPITAL ENCOUNTER (INPATIENT)
Facility: HOSPITAL | Age: 73
LOS: 4 days | Discharge: HOME | DRG: 247 | End: 2023-08-15
Attending: EMERGENCY MEDICINE | Admitting: PEDIATRICS
Payer: MEDICARE

## 2023-08-11 ENCOUNTER — HOSPITAL ENCOUNTER (EMERGENCY)
Facility: HOSPITAL | Age: 73
Discharge: LEFT BEFORE DISCHARGE | DRG: 247 | End: 2023-08-11
Attending: EMERGENCY MEDICINE
Payer: MEDICARE

## 2023-08-11 VITALS
HEART RATE: 76 BPM | SYSTOLIC BLOOD PRESSURE: 121 MMHG | HEIGHT: 70 IN | OXYGEN SATURATION: 98 % | DIASTOLIC BLOOD PRESSURE: 68 MMHG | BODY MASS INDEX: 27.35 KG/M2 | TEMPERATURE: 97.8 F | RESPIRATION RATE: 18 BRPM | WEIGHT: 191 LBS

## 2023-08-11 DIAGNOSIS — I25.118 CORONARY ARTERY DISEASE INVOLVING NATIVE HEART WITH OTHER FORM OF ANGINA PECTORIS, UNSPECIFIED VESSEL OR LESION TYPE: ICD-10-CM

## 2023-08-11 DIAGNOSIS — R07.2 PRECORDIAL CHEST PAIN: ICD-10-CM

## 2023-08-11 DIAGNOSIS — I20.0 UNSTABLE ANGINA: Primary | ICD-10-CM

## 2023-08-11 DIAGNOSIS — I21.4 NSTEMI (NON-ST ELEVATED MYOCARDIAL INFARCTION): ICD-10-CM

## 2023-08-11 DIAGNOSIS — R07.9 CHEST PAIN, UNSPECIFIED TYPE: Primary | ICD-10-CM

## 2023-08-11 PROBLEM — E11.65 TYPE 2 DIABETES MELLITUS WITH HYPERGLYCEMIA, WITHOUT LONG-TERM CURRENT USE OF INSULIN: Status: ACTIVE | Noted: 2023-08-11

## 2023-08-11 LAB
ALBUMIN SERPL-MCNC: 4 G/DL (ref 3.5–5.2)
ALBUMIN SERPL-MCNC: 4 G/DL (ref 3.5–5.2)
ALBUMIN/GLOB SERPL: 1.4 G/DL
ALBUMIN/GLOB SERPL: 1.6 G/DL
ALP SERPL-CCNC: 81 U/L (ref 39–117)
ALP SERPL-CCNC: 87 U/L (ref 39–117)
ALT SERPL W P-5'-P-CCNC: 15 U/L (ref 1–41)
ALT SERPL W P-5'-P-CCNC: 18 U/L (ref 1–41)
ANION GAP SERPL CALCULATED.3IONS-SCNC: 12 MMOL/L (ref 5–15)
ANION GAP SERPL CALCULATED.3IONS-SCNC: 13 MMOL/L (ref 5–15)
AST SERPL-CCNC: 15 U/L (ref 1–40)
AST SERPL-CCNC: 19 U/L (ref 1–40)
BASOPHILS # BLD AUTO: 0.04 10*3/MM3 (ref 0–0.2)
BASOPHILS # BLD AUTO: 0.04 10*3/MM3 (ref 0–0.2)
BASOPHILS NFR BLD AUTO: 0.4 % (ref 0–1.5)
BASOPHILS NFR BLD AUTO: 0.5 % (ref 0–1.5)
BILIRUB SERPL-MCNC: 0.3 MG/DL (ref 0–1.2)
BILIRUB SERPL-MCNC: 0.4 MG/DL (ref 0–1.2)
BUN SERPL-MCNC: 18 MG/DL (ref 8–23)
BUN SERPL-MCNC: 19 MG/DL (ref 8–23)
BUN/CREAT SERPL: 20 (ref 7–25)
BUN/CREAT SERPL: 25.7 (ref 7–25)
CALCIUM SPEC-SCNC: 8.6 MG/DL (ref 8.6–10.5)
CALCIUM SPEC-SCNC: 9.2 MG/DL (ref 8.6–10.5)
CHLORIDE SERPL-SCNC: 106 MMOL/L (ref 98–107)
CHLORIDE SERPL-SCNC: 106 MMOL/L (ref 98–107)
CHOLEST SERPL-MCNC: 98 MG/DL (ref 0–200)
CO2 SERPL-SCNC: 23 MMOL/L (ref 22–29)
CO2 SERPL-SCNC: 24 MMOL/L (ref 22–29)
CREAT SERPL-MCNC: 0.74 MG/DL (ref 0.76–1.27)
CREAT SERPL-MCNC: 0.9 MG/DL (ref 0.76–1.27)
D DIMER PPP FEU-MCNC: 0.38 MCGFEU/ML (ref 0–0.75)
DEPRECATED RDW RBC AUTO: 42.2 FL (ref 37–54)
DEPRECATED RDW RBC AUTO: 42.7 FL (ref 37–54)
EGFRCR SERPLBLD CKD-EPI 2021: 89.1 ML/MIN/1.73
EGFRCR SERPLBLD CKD-EPI 2021: 94.5 ML/MIN/1.73
EOSINOPHIL # BLD AUTO: 0.39 10*3/MM3 (ref 0–0.4)
EOSINOPHIL # BLD AUTO: 0.46 10*3/MM3 (ref 0–0.4)
EOSINOPHIL NFR BLD AUTO: 4.9 % (ref 0.3–6.2)
EOSINOPHIL NFR BLD AUTO: 5.1 % (ref 0.3–6.2)
ERYTHROCYTE [DISTWIDTH] IN BLOOD BY AUTOMATED COUNT: 12.7 % (ref 12.3–15.4)
ERYTHROCYTE [DISTWIDTH] IN BLOOD BY AUTOMATED COUNT: 12.8 % (ref 12.3–15.4)
GEN 5 2HR TROPONIN T REFLEX: 50 NG/L
GLOBULIN UR ELPH-MCNC: 2.5 GM/DL
GLOBULIN UR ELPH-MCNC: 2.8 GM/DL
GLUCOSE BLDC GLUCOMTR-MCNC: 136 MG/DL (ref 70–130)
GLUCOSE SERPL-MCNC: 190 MG/DL (ref 65–99)
GLUCOSE SERPL-MCNC: 234 MG/DL (ref 65–99)
HBA1C MFR BLD: 6.9 % (ref 4.8–5.6)
HCT VFR BLD AUTO: 37.6 % (ref 37.5–51)
HCT VFR BLD AUTO: 40.5 % (ref 37.5–51)
HDLC SERPL-MCNC: 29 MG/DL (ref 40–60)
HGB BLD-MCNC: 12.4 G/DL (ref 13–17.7)
HGB BLD-MCNC: 13.3 G/DL (ref 13–17.7)
HOLD SPECIMEN: NORMAL
IMM GRANULOCYTES # BLD AUTO: 0.01 10*3/MM3 (ref 0–0.05)
IMM GRANULOCYTES # BLD AUTO: 0.04 10*3/MM3 (ref 0–0.05)
IMM GRANULOCYTES NFR BLD AUTO: 0.1 % (ref 0–0.5)
IMM GRANULOCYTES NFR BLD AUTO: 0.4 % (ref 0–0.5)
LDLC SERPL CALC-MCNC: 34 MG/DL (ref 0–100)
LDLC/HDLC SERPL: 0.85 {RATIO}
LIPASE SERPL-CCNC: 21 U/L (ref 13–60)
LIPASE SERPL-CCNC: 25 U/L (ref 13–60)
LYMPHOCYTES # BLD AUTO: 1.55 10*3/MM3 (ref 0.7–3.1)
LYMPHOCYTES # BLD AUTO: 1.84 10*3/MM3 (ref 0.7–3.1)
LYMPHOCYTES NFR BLD AUTO: 19.5 % (ref 19.6–45.3)
LYMPHOCYTES NFR BLD AUTO: 20.3 % (ref 19.6–45.3)
MCH RBC QN AUTO: 29.4 PG (ref 26.6–33)
MCH RBC QN AUTO: 30.4 PG (ref 26.6–33)
MCHC RBC AUTO-ENTMCNC: 32.8 G/DL (ref 31.5–35.7)
MCHC RBC AUTO-ENTMCNC: 33 G/DL (ref 31.5–35.7)
MCV RBC AUTO: 89.6 FL (ref 79–97)
MCV RBC AUTO: 92.2 FL (ref 79–97)
MONOCYTES # BLD AUTO: 0.76 10*3/MM3 (ref 0.1–0.9)
MONOCYTES # BLD AUTO: 0.92 10*3/MM3 (ref 0.1–0.9)
MONOCYTES NFR BLD AUTO: 10.2 % (ref 5–12)
MONOCYTES NFR BLD AUTO: 9.6 % (ref 5–12)
NEUTROPHILS NFR BLD AUTO: 5.18 10*3/MM3 (ref 1.7–7)
NEUTROPHILS NFR BLD AUTO: 5.76 10*3/MM3 (ref 1.7–7)
NEUTROPHILS NFR BLD AUTO: 63.6 % (ref 42.7–76)
NEUTROPHILS NFR BLD AUTO: 65.4 % (ref 42.7–76)
NRBC BLD AUTO-RTO: 0 /100 WBC (ref 0–0.2)
NRBC BLD AUTO-RTO: 0 /100 WBC (ref 0–0.2)
NT-PROBNP SERPL-MCNC: 205.6 PG/ML (ref 0–1800)
NT-PROBNP SERPL-MCNC: 78.9 PG/ML (ref 0–1800)
PLATELET # BLD AUTO: 194 10*3/MM3 (ref 140–450)
PLATELET # BLD AUTO: 237 10*3/MM3 (ref 140–450)
PMV BLD AUTO: 10.1 FL (ref 6–12)
PMV BLD AUTO: 9.9 FL (ref 6–12)
POTASSIUM SERPL-SCNC: 3.6 MMOL/L (ref 3.5–5.2)
POTASSIUM SERPL-SCNC: 3.7 MMOL/L (ref 3.5–5.2)
PROT SERPL-MCNC: 6.5 G/DL (ref 6–8.5)
PROT SERPL-MCNC: 6.8 G/DL (ref 6–8.5)
RBC # BLD AUTO: 4.08 10*6/MM3 (ref 4.14–5.8)
RBC # BLD AUTO: 4.52 10*6/MM3 (ref 4.14–5.8)
SODIUM SERPL-SCNC: 141 MMOL/L (ref 136–145)
SODIUM SERPL-SCNC: 143 MMOL/L (ref 136–145)
TRIGL SERPL-MCNC: 222 MG/DL (ref 0–150)
TROPONIN T DELTA: 28 NG/L
TROPONIN T SERPL HS-MCNC: 22 NG/L
TROPONIN T SERPL HS-MCNC: 38 NG/L
TSH SERPL DL<=0.05 MIU/L-ACNC: 2.42 UIU/ML (ref 0.27–4.2)
VLDLC SERPL-MCNC: 35 MG/DL (ref 5–40)
WBC NRBC COR # BLD: 7.93 10*3/MM3 (ref 3.4–10.8)
WBC NRBC COR # BLD: 9.06 10*3/MM3 (ref 3.4–10.8)
WHOLE BLOOD HOLD COAG: NORMAL
WHOLE BLOOD HOLD COAG: NORMAL
WHOLE BLOOD HOLD SPECIMEN: NORMAL
WHOLE BLOOD HOLD SPECIMEN: NORMAL

## 2023-08-11 PROCEDURE — 99284 EMERGENCY DEPT VISIT MOD MDM: CPT

## 2023-08-11 PROCEDURE — 99223 1ST HOSP IP/OBS HIGH 75: CPT | Performed by: FAMILY MEDICINE

## 2023-08-11 PROCEDURE — 83036 HEMOGLOBIN GLYCOSYLATED A1C: CPT | Performed by: INTERNAL MEDICINE

## 2023-08-11 PROCEDURE — 83880 ASSAY OF NATRIURETIC PEPTIDE: CPT | Performed by: EMERGENCY MEDICINE

## 2023-08-11 PROCEDURE — 80053 COMPREHEN METABOLIC PANEL: CPT | Performed by: EMERGENCY MEDICINE

## 2023-08-11 PROCEDURE — 94799 UNLISTED PULMONARY SVC/PX: CPT

## 2023-08-11 PROCEDURE — 84484 ASSAY OF TROPONIN QUANT: CPT | Performed by: EMERGENCY MEDICINE

## 2023-08-11 PROCEDURE — 85025 COMPLETE CBC W/AUTO DIFF WBC: CPT | Performed by: EMERGENCY MEDICINE

## 2023-08-11 PROCEDURE — 85379 FIBRIN DEGRADATION QUANT: CPT | Performed by: EMERGENCY MEDICINE

## 2023-08-11 PROCEDURE — 82948 REAGENT STRIP/BLOOD GLUCOSE: CPT

## 2023-08-11 PROCEDURE — 36415 COLL VENOUS BLD VENIPUNCTURE: CPT

## 2023-08-11 PROCEDURE — 99285 EMERGENCY DEPT VISIT HI MDM: CPT

## 2023-08-11 PROCEDURE — 25010000002 NITROGLYCERIN 200 MCG/ML SOLUTION: Performed by: EMERGENCY MEDICINE

## 2023-08-11 PROCEDURE — 94640 AIRWAY INHALATION TREATMENT: CPT

## 2023-08-11 PROCEDURE — 93005 ELECTROCARDIOGRAM TRACING: CPT | Performed by: EMERGENCY MEDICINE

## 2023-08-11 PROCEDURE — 80061 LIPID PANEL: CPT | Performed by: INTERNAL MEDICINE

## 2023-08-11 PROCEDURE — 71045 X-RAY EXAM CHEST 1 VIEW: CPT

## 2023-08-11 PROCEDURE — 83690 ASSAY OF LIPASE: CPT | Performed by: EMERGENCY MEDICINE

## 2023-08-11 PROCEDURE — 84443 ASSAY THYROID STIM HORMONE: CPT | Performed by: INTERNAL MEDICINE

## 2023-08-11 RX ORDER — HEPARIN SODIUM 10000 [USP'U]/100ML
1000 INJECTION, SOLUTION INTRAVENOUS
Status: DISCONTINUED | OUTPATIENT
Start: 2023-08-11 | End: 2023-08-11

## 2023-08-11 RX ORDER — ASPIRIN 81 MG/1
324 TABLET, CHEWABLE ORAL ONCE
Status: DISCONTINUED | OUTPATIENT
Start: 2023-08-11 | End: 2023-08-14

## 2023-08-11 RX ORDER — DEXTROSE MONOHYDRATE 25 G/50ML
25 INJECTION, SOLUTION INTRAVENOUS
Status: CANCELLED | OUTPATIENT
Start: 2023-08-11

## 2023-08-11 RX ORDER — HEPARIN SODIUM 1000 [USP'U]/ML
2000 INJECTION, SOLUTION INTRAVENOUS; SUBCUTANEOUS AS NEEDED
Status: DISCONTINUED | OUTPATIENT
Start: 2023-08-11 | End: 2023-08-11

## 2023-08-11 RX ORDER — ONDANSETRON 2 MG/ML
4 INJECTION INTRAMUSCULAR; INTRAVENOUS EVERY 6 HOURS PRN
Status: CANCELLED | OUTPATIENT
Start: 2023-08-11

## 2023-08-11 RX ORDER — NITROGLYCERIN 0.4 MG/1
0.4 TABLET SUBLINGUAL
Qty: 100 TABLET | Refills: 0 | Status: SHIPPED | OUTPATIENT
Start: 2023-08-11

## 2023-08-11 RX ORDER — AMOXICILLIN 250 MG
2 CAPSULE ORAL 2 TIMES DAILY
Status: DISCONTINUED | OUTPATIENT
Start: 2023-08-11 | End: 2023-08-15 | Stop reason: HOSPADM

## 2023-08-11 RX ORDER — NITROGLYCERIN 20 MG/100ML
5-200 INJECTION INTRAVENOUS
Status: DISCONTINUED | OUTPATIENT
Start: 2023-08-11 | End: 2023-08-14

## 2023-08-11 RX ORDER — TAMSULOSIN HYDROCHLORIDE 0.4 MG/1
0.4 CAPSULE ORAL DAILY
Status: CANCELLED | OUTPATIENT
Start: 2023-08-11

## 2023-08-11 RX ORDER — MORPHINE SULFATE 2 MG/ML
1 INJECTION, SOLUTION INTRAMUSCULAR; INTRAVENOUS EVERY 4 HOURS PRN
Status: DISPENSED | OUTPATIENT
Start: 2023-08-11 | End: 2023-08-13

## 2023-08-11 RX ORDER — ROSUVASTATIN CALCIUM 20 MG/1
40 TABLET, COATED ORAL NIGHTLY
Status: DISCONTINUED | OUTPATIENT
Start: 2023-08-11 | End: 2023-08-15 | Stop reason: HOSPADM

## 2023-08-11 RX ORDER — SODIUM CHLORIDE 0.9 % (FLUSH) 0.9 %
10 SYRINGE (ML) INJECTION EVERY 12 HOURS SCHEDULED
Status: CANCELLED | OUTPATIENT
Start: 2023-08-11

## 2023-08-11 RX ORDER — NICOTINE POLACRILEX 4 MG
15 LOZENGE BUCCAL
Status: CANCELLED | OUTPATIENT
Start: 2023-08-11

## 2023-08-11 RX ORDER — SODIUM CHLORIDE 9 MG/ML
40 INJECTION, SOLUTION INTRAVENOUS AS NEEDED
Status: CANCELLED | OUTPATIENT
Start: 2023-08-11

## 2023-08-11 RX ORDER — BISACODYL 5 MG/1
5 TABLET, DELAYED RELEASE ORAL DAILY PRN
Status: CANCELLED | OUTPATIENT
Start: 2023-08-11

## 2023-08-11 RX ORDER — SODIUM CHLORIDE 9 MG/ML
40 INJECTION, SOLUTION INTRAVENOUS AS NEEDED
Status: DISCONTINUED | OUTPATIENT
Start: 2023-08-11 | End: 2023-08-15 | Stop reason: HOSPADM

## 2023-08-11 RX ORDER — HYDROCODONE BITARTRATE AND ACETAMINOPHEN 7.5; 325 MG/1; MG/1
1 TABLET ORAL EVERY 6 HOURS PRN
Status: CANCELLED | OUTPATIENT
Start: 2023-08-11

## 2023-08-11 RX ORDER — ACETAMINOPHEN 650 MG/1
650 SUPPOSITORY RECTAL EVERY 4 HOURS PRN
Status: CANCELLED | OUTPATIENT
Start: 2023-08-11

## 2023-08-11 RX ORDER — NICOTINE 21 MG/24HR
1 PATCH, TRANSDERMAL 24 HOURS TRANSDERMAL EVERY 24 HOURS
Status: CANCELLED | OUTPATIENT
Start: 2023-08-11

## 2023-08-11 RX ORDER — ONDANSETRON 2 MG/ML
4 INJECTION INTRAMUSCULAR; INTRAVENOUS EVERY 6 HOURS PRN
Status: DISCONTINUED | OUTPATIENT
Start: 2023-08-11 | End: 2023-08-15 | Stop reason: HOSPADM

## 2023-08-11 RX ORDER — INSULIN LISPRO 100 [IU]/ML
2-9 INJECTION, SOLUTION INTRAVENOUS; SUBCUTANEOUS
Status: DISCONTINUED | OUTPATIENT
Start: 2023-08-11 | End: 2023-08-15 | Stop reason: HOSPADM

## 2023-08-11 RX ORDER — SODIUM CHLORIDE 0.9 % (FLUSH) 0.9 %
10 SYRINGE (ML) INJECTION AS NEEDED
Status: DISCONTINUED | OUTPATIENT
Start: 2023-08-11 | End: 2023-08-15 | Stop reason: HOSPADM

## 2023-08-11 RX ORDER — ASPIRIN 81 MG/1
81 TABLET ORAL DAILY
Status: CANCELLED | OUTPATIENT
Start: 2023-08-11

## 2023-08-11 RX ORDER — NICOTINE 21 MG/24HR
1 PATCH, TRANSDERMAL 24 HOURS TRANSDERMAL EVERY 24 HOURS
Status: DISCONTINUED | OUTPATIENT
Start: 2023-08-11 | End: 2023-08-15 | Stop reason: HOSPADM

## 2023-08-11 RX ORDER — CHOLECALCIFEROL (VITAMIN D3) 125 MCG
5 CAPSULE ORAL NIGHTLY PRN
Status: CANCELLED | OUTPATIENT
Start: 2023-08-11

## 2023-08-11 RX ORDER — NITROGLYCERIN 0.4 MG/1
0.4 TABLET SUBLINGUAL
Status: DISCONTINUED | OUTPATIENT
Start: 2023-08-11 | End: 2023-08-14 | Stop reason: SDUPTHER

## 2023-08-11 RX ORDER — DEXTROSE MONOHYDRATE 25 G/50ML
25 INJECTION, SOLUTION INTRAVENOUS
Status: DISCONTINUED | OUTPATIENT
Start: 2023-08-11 | End: 2023-08-15 | Stop reason: HOSPADM

## 2023-08-11 RX ORDER — HYDROCODONE BITARTRATE AND ACETAMINOPHEN 7.5; 325 MG/1; MG/1
1 TABLET ORAL EVERY 6 HOURS PRN
Status: DISCONTINUED | OUTPATIENT
Start: 2023-08-11 | End: 2023-08-15 | Stop reason: HOSPADM

## 2023-08-11 RX ORDER — NICOTINE POLACRILEX 4 MG
15 LOZENGE BUCCAL
Status: DISCONTINUED | OUTPATIENT
Start: 2023-08-11 | End: 2023-08-15 | Stop reason: HOSPADM

## 2023-08-11 RX ORDER — POLYETHYLENE GLYCOL 3350 17 G/17G
17 POWDER, FOR SOLUTION ORAL DAILY PRN
Status: CANCELLED | OUTPATIENT
Start: 2023-08-11

## 2023-08-11 RX ORDER — SODIUM CHLORIDE 0.9 % (FLUSH) 0.9 %
10 SYRINGE (ML) INJECTION AS NEEDED
Status: DISCONTINUED | OUTPATIENT
Start: 2023-08-11 | End: 2023-08-11 | Stop reason: SDUPTHER

## 2023-08-11 RX ORDER — BISACODYL 5 MG/1
5 TABLET, DELAYED RELEASE ORAL DAILY PRN
Status: DISCONTINUED | OUTPATIENT
Start: 2023-08-11 | End: 2023-08-15 | Stop reason: HOSPADM

## 2023-08-11 RX ORDER — NICOTINE 21 MG/24HR
1 PATCH, TRANSDERMAL 24 HOURS TRANSDERMAL EVERY 24 HOURS
Status: DISCONTINUED | OUTPATIENT
Start: 2023-08-12 | End: 2023-08-11

## 2023-08-11 RX ORDER — ACETAMINOPHEN 325 MG/1
650 TABLET ORAL EVERY 4 HOURS PRN
Status: CANCELLED | OUTPATIENT
Start: 2023-08-11

## 2023-08-11 RX ORDER — POLYETHYLENE GLYCOL 3350 17 G/17G
17 POWDER, FOR SOLUTION ORAL DAILY PRN
Status: DISCONTINUED | OUTPATIENT
Start: 2023-08-11 | End: 2023-08-15 | Stop reason: HOSPADM

## 2023-08-11 RX ORDER — NALOXONE HCL 0.4 MG/ML
0.4 VIAL (ML) INJECTION
Status: DISCONTINUED | OUTPATIENT
Start: 2023-08-11 | End: 2023-08-15 | Stop reason: HOSPADM

## 2023-08-11 RX ORDER — ASPIRIN 81 MG/1
324 TABLET, CHEWABLE ORAL ONCE
Status: DISCONTINUED | OUTPATIENT
Start: 2023-08-11 | End: 2023-08-11

## 2023-08-11 RX ORDER — ACETAMINOPHEN 325 MG/1
650 TABLET ORAL EVERY 4 HOURS PRN
Status: DISCONTINUED | OUTPATIENT
Start: 2023-08-11 | End: 2023-08-14 | Stop reason: SDUPTHER

## 2023-08-11 RX ORDER — ALBUTEROL SULFATE 90 UG/1
2 AEROSOL, METERED RESPIRATORY (INHALATION) EVERY 4 HOURS PRN
Status: CANCELLED | OUTPATIENT
Start: 2023-08-11

## 2023-08-11 RX ORDER — IPRATROPIUM BROMIDE AND ALBUTEROL SULFATE 2.5; .5 MG/3ML; MG/3ML
3 SOLUTION RESPIRATORY (INHALATION) ONCE
Status: COMPLETED | OUTPATIENT
Start: 2023-08-11 | End: 2023-08-11

## 2023-08-11 RX ORDER — ASPIRIN 81 MG/1
81 TABLET ORAL DAILY
Status: DISCONTINUED | OUTPATIENT
Start: 2023-08-12 | End: 2023-08-15 | Stop reason: HOSPADM

## 2023-08-11 RX ORDER — BISACODYL 10 MG
10 SUPPOSITORY, RECTAL RECTAL DAILY PRN
Status: CANCELLED | OUTPATIENT
Start: 2023-08-11

## 2023-08-11 RX ORDER — LORAZEPAM 1 MG/1
2 TABLET ORAL EVERY 6 HOURS PRN
Status: DISCONTINUED | OUTPATIENT
Start: 2023-08-11 | End: 2023-08-15 | Stop reason: HOSPADM

## 2023-08-11 RX ORDER — BISACODYL 10 MG
10 SUPPOSITORY, RECTAL RECTAL DAILY PRN
Status: DISCONTINUED | OUTPATIENT
Start: 2023-08-11 | End: 2023-08-15 | Stop reason: HOSPADM

## 2023-08-11 RX ORDER — HEPARIN SODIUM 1000 [USP'U]/ML
4000 INJECTION, SOLUTION INTRAVENOUS; SUBCUTANEOUS AS NEEDED
Status: DISCONTINUED | OUTPATIENT
Start: 2023-08-11 | End: 2023-08-11

## 2023-08-11 RX ORDER — TAMSULOSIN HYDROCHLORIDE 0.4 MG/1
0.4 CAPSULE ORAL DAILY
Status: DISCONTINUED | OUTPATIENT
Start: 2023-08-12 | End: 2023-08-15 | Stop reason: HOSPADM

## 2023-08-11 RX ORDER — IPRATROPIUM BROMIDE AND ALBUTEROL SULFATE 2.5; .5 MG/3ML; MG/3ML
3 SOLUTION RESPIRATORY (INHALATION)
Status: DISCONTINUED | OUTPATIENT
Start: 2023-08-11 | End: 2023-08-15 | Stop reason: HOSPADM

## 2023-08-11 RX ORDER — ROSUVASTATIN CALCIUM 20 MG/1
20 TABLET, COATED ORAL DAILY
Status: CANCELLED | OUTPATIENT
Start: 2023-08-11

## 2023-08-11 RX ORDER — LISINOPRIL 5 MG/1
5 TABLET ORAL DAILY
Status: DISCONTINUED | OUTPATIENT
Start: 2023-08-12 | End: 2023-08-15 | Stop reason: HOSPADM

## 2023-08-11 RX ORDER — BUDESONIDE AND FORMOTEROL FUMARATE DIHYDRATE 160; 4.5 UG/1; UG/1
2 AEROSOL RESPIRATORY (INHALATION)
Status: CANCELLED | OUTPATIENT
Start: 2023-08-11

## 2023-08-11 RX ORDER — SODIUM CHLORIDE 0.9 % (FLUSH) 0.9 %
10 SYRINGE (ML) INJECTION AS NEEDED
Status: CANCELLED | OUTPATIENT
Start: 2023-08-11

## 2023-08-11 RX ORDER — ACETAMINOPHEN 160 MG/5ML
650 SOLUTION ORAL EVERY 4 HOURS PRN
Status: CANCELLED | OUTPATIENT
Start: 2023-08-11

## 2023-08-11 RX ORDER — ALUMINA, MAGNESIA, AND SIMETHICONE 2400; 2400; 240 MG/30ML; MG/30ML; MG/30ML
15 SUSPENSION ORAL EVERY 6 HOURS PRN
Status: DISCONTINUED | OUTPATIENT
Start: 2023-08-11 | End: 2023-08-15 | Stop reason: HOSPADM

## 2023-08-11 RX ORDER — AMLODIPINE BESYLATE 10 MG/1
10 TABLET ORAL DAILY
Status: CANCELLED | OUTPATIENT
Start: 2023-08-11

## 2023-08-11 RX ORDER — SOTALOL HYDROCHLORIDE 80 MG/1
80 TABLET ORAL EVERY 12 HOURS SCHEDULED
Status: DISCONTINUED | OUTPATIENT
Start: 2023-08-11 | End: 2023-08-15 | Stop reason: HOSPADM

## 2023-08-11 RX ORDER — ENOXAPARIN SODIUM 100 MG/ML
1 INJECTION SUBCUTANEOUS ONCE
Status: DISCONTINUED | OUTPATIENT
Start: 2023-08-11 | End: 2023-08-12

## 2023-08-11 RX ORDER — SODIUM CHLORIDE 0.9 % (FLUSH) 0.9 %
10 SYRINGE (ML) INJECTION AS NEEDED
Status: DISCONTINUED | OUTPATIENT
Start: 2023-08-11 | End: 2023-08-11

## 2023-08-11 RX ORDER — AMOXICILLIN 250 MG
2 CAPSULE ORAL 2 TIMES DAILY
Status: CANCELLED | OUTPATIENT
Start: 2023-08-11

## 2023-08-11 RX ORDER — SODIUM CHLORIDE 0.9 % (FLUSH) 0.9 %
10 SYRINGE (ML) INJECTION EVERY 12 HOURS SCHEDULED
Status: DISCONTINUED | OUTPATIENT
Start: 2023-08-11 | End: 2023-08-15 | Stop reason: HOSPADM

## 2023-08-11 RX ORDER — IBUPROFEN 600 MG/1
1 TABLET ORAL
Status: CANCELLED | OUTPATIENT
Start: 2023-08-11

## 2023-08-11 RX ORDER — IBUPROFEN 600 MG/1
1 TABLET ORAL
Status: DISCONTINUED | OUTPATIENT
Start: 2023-08-11 | End: 2023-08-15 | Stop reason: HOSPADM

## 2023-08-11 RX ORDER — AMLODIPINE BESYLATE 5 MG/1
5 TABLET ORAL DAILY
Status: DISCONTINUED | OUTPATIENT
Start: 2023-08-12 | End: 2023-08-15 | Stop reason: HOSPADM

## 2023-08-11 RX ORDER — HEPARIN SODIUM 1000 [USP'U]/ML
4000 INJECTION, SOLUTION INTRAVENOUS; SUBCUTANEOUS ONCE
Status: DISCONTINUED | OUTPATIENT
Start: 2023-08-11 | End: 2023-08-11

## 2023-08-11 RX ADMIN — NITROGLYCERIN 10 MCG/MIN: 20 INJECTION INTRAVENOUS at 19:36

## 2023-08-11 RX ADMIN — NITROGLYCERIN 0.5 INCH: 20 OINTMENT TOPICAL at 02:58

## 2023-08-11 RX ADMIN — Medication 10 ML: at 21:56

## 2023-08-11 RX ADMIN — HYDROCODONE BITARTRATE AND ACETAMINOPHEN 1 TABLET: 7.5; 325 TABLET ORAL at 21:55

## 2023-08-11 RX ADMIN — Medication 1 PATCH: at 22:25

## 2023-08-11 RX ADMIN — IPRATROPIUM BROMIDE AND ALBUTEROL SULFATE 3 ML: .5; 3 SOLUTION RESPIRATORY (INHALATION) at 03:42

## 2023-08-11 RX ADMIN — SOTALOL HYDROCHLORIDE 80 MG: 80 TABLET ORAL at 21:55

## 2023-08-11 NOTE — Clinical Note
First balloon inflation max pressure = 12 tomasz. First balloon inflation duration = 15 seconds. Second inflation of balloon - Max pressure = 12 tomasz. 2nd Inflation of balloon - Duration = 15 seconds.

## 2023-08-11 NOTE — ED PROVIDER NOTES
Subjective   History of Present Illness  73-year-old male with history of known coronary artery disease status post coronary artery stents followed by cardiologist Dr. Mukherjee, presents the emergency department brought by EMS with concerns about severe 9/10 midsternal chest pain radiating to his arm which woke him from sleep shortly prior to arrival.  EMS administered aspirin and nitroglycerin prior to arrival which improved his pain.  The patient states his pain is 3-4 out of 10 upon my initial assessment.  He reports compliance with his medications as prescribed, but continues to smoke cigarettes.  He denies associated nausea, vomiting, or shortness of breath.    Review of Systems   Constitutional:  Negative for fever.   HENT:  Negative for facial swelling.    Eyes:  Negative for photophobia and discharge.   Respiratory:  Negative for stridor.    Cardiovascular:  Positive for chest pain.   Neurological:  Negative for facial asymmetry and speech difficulty.     Past Medical History:   Diagnosis Date    Allergic rhinitis     seasonal    Atrial fibrillation     COPD (chronic obstructive pulmonary disease)     Coronary artery disease     History of cardiac catheterization     Hypercholesterolemia     Hyperlipidemia     Hypertension     Kidney stones     Recurrent chest pain     Tobacco abuse        Allergies   Allergen Reactions    Effient [Prasugrel] Rash    Clopidogrel     Prednisone        Past Surgical History:   Procedure Laterality Date    CORONARY ANGIOPLASTY WITH STENT PLACEMENT         Family History   Problem Relation Age of Onset    No Known Problems Mother     No Known Problems Father        Social History     Socioeconomic History    Marital status:    Tobacco Use    Smoking status: Every Day     Packs/day: 2.00     Types: Cigarettes    Smokeless tobacco: Never   Vaping Use    Vaping Use: Never used   Substance and Sexual Activity    Alcohol use: No    Drug use: No    Sexual activity: Defer            Objective   Physical Exam  Vitals and nursing note reviewed.   Constitutional:       General: He is not in acute distress.     Appearance: He is not diaphoretic.   HENT:      Mouth/Throat:      Comments: Moist mucosa without pallor  Eyes:      General: No scleral icterus.     Comments: No photophobia   Neck:      Comments: No meningismus  Cardiovascular:      Rate and Rhythm: Normal rate and regular rhythm.      Heart sounds: Normal heart sounds. No murmur heard.    No friction rub. No gallop.      Comments: Not tachycardic on my exam. S1, S2.  Pulmonary:      Effort: Pulmonary effort is normal. No tachypnea or respiratory distress.      Breath sounds: Normal breath sounds. No stridor. No decreased breath sounds, wheezing, rhonchi or rales.      Comments: No respiratory distress  Chest:      Chest wall: No tenderness.      Comments: Left parasternal skin lesion approximately the size of a nickel (pt states known skin cancer)  Abdominal:      Palpations: Abdomen is soft.      Tenderness: There is no abdominal tenderness. There is no guarding or rebound.   Musculoskeletal:      Cervical back: Neck supple.      Comments: No calf tenderness bilaterally.  No significant peripheral edema.   Skin:     General: Skin is warm and dry.   Neurological:      Mental Status: He is alert.      Comments: Normal speech, no dysarthria.                  ED Course  ED Course as of 08/11/23 0727   Fri Aug 11, 2023   0546 2nd troponin in process. [LD]   0632 Cardiology on-call paged. [LD]   0633 Patient is refusing hospitalization at this time.  His son is at the bedside and states that the patient is stubborn.  The patient states that his chest pain is completely resolved and he is uncomfortable and cold.  He does not want to stay in the hospital because he does not like how tight the blood pressure cuff is and having the wires attached to him and his IV is bothering him.  He states that if he comes in the hospital he will be  able to sleep.  He is able to verbally acknowledge the risks of leaving including malignant dysrhythmia and death or disability.  He was encouraged to return the emergency department at any time if he felt he could stay.  He seems to have the mental capacity to make decisions about his own health care.  His son is at the bedside for this conversation. [LD]   8104 No response from cardiology yet. [employer] call center contacted Dr. Ponce paged again cardiology on call. [LD]      ED Course User Index  [LD] Vicki Ledesma MD                      HEART Score: 7                      Medical Decision Making  Differential diagnosis includes unstable angina, esophageal spasm, GERD, costochondritis, PE, and others.    Problems Addressed:  Precordial chest pain: complicated acute illness or injury  Unstable angina: complicated acute illness or injury    Amount and/or Complexity of Data Reviewed  Independent Historian: EMS  External Data Reviewed: notes.     Details: Dr. Mukherjee cardiologist progress note from January 2023 reviewed:  Paroxysmal atrial fibrillation (HCC)  PROBLEM LIST:  1.  Coronary artery disease                        A.  [facility], February 20, 2015: ANALI to OM, 50% occlusion of mid RCA, 60-70% occlusion of LAD.                        B.  scheduled functional assessment via left heart catheterization 3 weeks after intervention, patient did not keep appointment.                        C.  recurrent chest pain with left heart catheterization May 12, 2015: Drug-eluting stent to LAD, drug-eluting stent to the RCA.  EF 50%.  2.  Essential hypertension  3.  Dyslipidemia  4.  COPD with ongoing tobacco abuse  5.  History of ventricular fibrillation with arrest, 2012  6.  Family history precocious CAD  Labs: ordered. Decision-making details documented in ED Course.  Radiology: ordered. Decision-making details documented in ED Course.  ECG/medicine tests: ordered and independent interpretation performed.  Decision-making details documented in ED Course.     Details: EKG at 0235 shows normal sinus rhythm at a rate of 88 bpm, incomplete right bundle branch block, no acute ischemic changes. Repeat EKG at 0457 shows normal sinus rhythm at a rate of 79 beats per minute, incomplete right bundle branch block, no acute ischemic changes, no significant dynamic EKG changes.  Discussion of management or test interpretation with external provider(s): Case discussed with cardiology, Dr. Ponce, on-call for Dr. Mukherjee, patient's cardiologist.  We will refill his Nitro-stat and he took the patient's contact information to try to get the patient a close follow-up appointment.  I updated the patient again at approximately 7:16 AM to confirm his pharmacy, and discussed that he should return to the emergency department for any worsening, and that he will be receiving contact to set up close follow-up appointment with cardiology.  Patient expressed understanding.    Risk  OTC drugs.  Prescription drug management.  Decision regarding hospitalization.      Recent Results (from the past 24 hour(s))   High Sensitivity Troponin T    Collection Time: 08/11/23  2:35 AM    Specimen: Blood   Result Value Ref Range    HS Troponin T 22 (H) <15 ng/L   Comprehensive Metabolic Panel    Collection Time: 08/11/23  2:35 AM    Specimen: Blood   Result Value Ref Range    Glucose 234 (H) 65 - 99 mg/dL    BUN 18 8 - 23 mg/dL    Creatinine 0.90 0.76 - 1.27 mg/dL    Sodium 143 136 - 145 mmol/L    Potassium 3.6 3.5 - 5.2 mmol/L    Chloride 106 98 - 107 mmol/L    CO2 24.0 22.0 - 29.0 mmol/L    Calcium 9.2 8.6 - 10.5 mg/dL    Total Protein 6.8 6.0 - 8.5 g/dL    Albumin 4.0 3.5 - 5.2 g/dL    ALT (SGPT) 18 1 - 41 U/L    AST (SGOT) 19 1 - 40 U/L    Alkaline Phosphatase 87 39 - 117 U/L    Total Bilirubin 0.4 0.0 - 1.2 mg/dL    Globulin 2.8 gm/dL    A/G Ratio 1.4 g/dL    BUN/Creatinine Ratio 20.0 7.0 - 25.0    Anion Gap 13.0 5.0 - 15.0 mmol/L    eGFR 89.1 >60.0  mL/min/1.73   Lipase    Collection Time: 08/11/23  2:35 AM    Specimen: Blood   Result Value Ref Range    Lipase 21 13 - 60 U/L   BNP    Collection Time: 08/11/23  2:35 AM    Specimen: Blood   Result Value Ref Range    proBNP 78.9 0.0 - 1,800.0 pg/mL   Green Top (Gel)    Collection Time: 08/11/23  2:35 AM   Result Value Ref Range    Extra Tube Hold for add-ons.    Lavender Top    Collection Time: 08/11/23  2:35 AM   Result Value Ref Range    Extra Tube hold for add-on    Gold Top - SST    Collection Time: 08/11/23  2:35 AM   Result Value Ref Range    Extra Tube Hold for add-ons.    Gray Top    Collection Time: 08/11/23  2:35 AM   Result Value Ref Range    Extra Tube Hold for add-ons.    Light Blue Top    Collection Time: 08/11/23  2:35 AM   Result Value Ref Range    Extra Tube Hold for add-ons.    CBC Auto Differential    Collection Time: 08/11/23  2:35 AM    Specimen: Blood   Result Value Ref Range    WBC 9.06 3.40 - 10.80 10*3/mm3    RBC 4.52 4.14 - 5.80 10*6/mm3    Hemoglobin 13.3 13.0 - 17.7 g/dL    Hematocrit 40.5 37.5 - 51.0 %    MCV 89.6 79.0 - 97.0 fL    MCH 29.4 26.6 - 33.0 pg    MCHC 32.8 31.5 - 35.7 g/dL    RDW 12.8 12.3 - 15.4 %    RDW-SD 42.2 37.0 - 54.0 fl    MPV 9.9 6.0 - 12.0 fL    Platelets 237 140 - 450 10*3/mm3    Neutrophil % 63.6 42.7 - 76.0 %    Lymphocyte % 20.3 19.6 - 45.3 %    Monocyte % 10.2 5.0 - 12.0 %    Eosinophil % 5.1 0.3 - 6.2 %    Basophil % 0.4 0.0 - 1.5 %    Immature Grans % 0.4 0.0 - 0.5 %    Neutrophils, Absolute 5.76 1.70 - 7.00 10*3/mm3    Lymphocytes, Absolute 1.84 0.70 - 3.10 10*3/mm3    Monocytes, Absolute 0.92 (H) 0.10 - 0.90 10*3/mm3    Eosinophils, Absolute 0.46 (H) 0.00 - 0.40 10*3/mm3    Basophils, Absolute 0.04 0.00 - 0.20 10*3/mm3    Immature Grans, Absolute 0.04 0.00 - 0.05 10*3/mm3    nRBC 0.0 0.0 - 0.2 /100 WBC   D-dimer, Quantitative    Collection Time: 08/11/23  2:35 AM    Specimen: Blood   Result Value Ref Range    D-Dimer, Quantitative 0.38 0.00 - 0.75  MCGFEU/mL   Hemoglobin A1c    Collection Time: 08/11/23  2:35 AM    Specimen: Blood   Result Value Ref Range    Hemoglobin A1C 6.90 (H) 4.80 - 5.60 %   TSH    Collection Time: 08/11/23  2:35 AM    Specimen: Blood   Result Value Ref Range    TSH 2.420 0.270 - 4.200 uIU/mL   Lipid Panel    Collection Time: 08/11/23  2:35 AM    Specimen: Blood   Result Value Ref Range    Total Cholesterol 98 0 - 200 mg/dL    Triglycerides 222 (H) 0 - 150 mg/dL    HDL Cholesterol 29 (L) 40 - 60 mg/dL    LDL Cholesterol  34 0 - 100 mg/dL    VLDL Cholesterol 35 5 - 40 mg/dL    LDL/HDL Ratio 0.85    ECG 12 Lead ED Triage Standing Order; Chest Pain    Collection Time: 08/11/23  2:35 AM   Result Value Ref Range    QT Interval 390 ms    QTC Interval 471 ms   ECG 12 Lead ED Triage Standing Order; Chest Pain    Collection Time: 08/11/23  4:57 AM   Result Value Ref Range    QT Interval 404 ms    QTC Interval 463 ms   High Sensitivity Troponin T 2Hr    Collection Time: 08/11/23  4:59 AM    Specimen: Blood   Result Value Ref Range    HS Troponin T 50 (H) <15 ng/L    Troponin T Delta 28 (C) >=-4 - <+4 ng/L     Note: In addition to lab results from this visit, the labs listed above may include labs taken at another facility or during a different encounter within the last 24 hours. Please correlate lab times with ED admission and discharge times for further clarification of the services performed during this visit.    XR Chest 1 View   Final Result   Impression:   No acute cardiopulmonary abnormality.         Electronically Signed: Abrahan Baugh MD     8/11/2023 2:59 AM EDT     Workstation ID: [id number]        Vitals:    08/11/23 0500 08/11/23 0515 08/11/23 0700 08/11/23 0715   BP: 132/74 120/60 114/63 119/63   Pulse: 80 91 75 66   Resp:       Temp:       TempSrc:       SpO2: 97% 99% 99% 99%   Weight:       Height:         Medications   sodium chloride 0.9 % flush 10 mL (has no administration in time range)   aspirin chewable tablet 324 mg (324 mg  Oral Not Given 8/11/23 0237)   nitroglycerin (NITROSTAT) ointment 0.5 inch (0.5 inches Topical Given 8/11/23 0258)   ipratropium-albuterol (DUO-NEB) nebulizer solution 3 mL (3 mL Nebulization Given 8/11/23 4832)     ECG/EMG Results (last 24 hours)       Procedure Component Value Units Date/Time    ECG 12 Lead ED Triage Standing Order; Chest Pain [[id number]] Collected: 08/11/23 0235     Updated: 08/11/23 0234     QT Interval 390 ms      QTC Interval 471 ms     Narrative:      Test Reason : TS  Blood Pressure :   */*   mmHG  Vent. Rate :  88 BPM     Atrial Rate :  88 BPM     P-R Int : 166 ms          QRS Dur : 100 ms      QT Int : 390 ms       P-R-T Axes :  83  71  17 degrees     QTc Int : 471 ms    ** Poor data quality, interpretation may be adversely affected  Normal sinus rhythm  Incomplete right bundle branch block  Borderline ECG  When compared with ECG of 16-NOV-2021 16:02,  T wave inversion now evident in Inferior leads    Referred By: EDMD           Confirmed By:           ECG 12 Lead ED Triage Standing Order; Chest Pain   Preliminary Result   Test Reason : ED Triage Standing Order~   Blood Pressure :   */*   mmHG   Vent. Rate :  79 BPM     Atrial Rate :  79 BPM      P-R Int : 176 ms          QRS Dur : 100 ms       QT Int : 404 ms       P-R-T Axes :  60  60  28 degrees      QTc Int : 463 ms      Normal sinus rhythm   Incomplete right bundle branch block   Borderline ECG   When compared with ECG of 11-AUG-2023 02:35, (Unconfirmed)   No significant change was found      Referred By:            Confirmed By:       ECG 12 Lead ED Triage Standing Order; Chest Pain   Preliminary Result   Test Reason : TS   Blood Pressure :   */*   mmHG   Vent. Rate :  88 BPM     Atrial Rate :  88 BPM      P-R Int : 166 ms          QRS Dur : 100 ms       QT Int : 390 ms       P-R-T Axes :  83  71  17 degrees      QTc Int : 471 ms      ** Poor data quality, interpretation may be adversely affected   Normal sinus rhythm   Incomplete  right bundle branch block   Borderline ECG   When compared with ECG of 16-NOV-2021 16:02,   T wave inversion now evident in Inferior leads      Referred By: EDMD           Confirmed By:               Final diagnoses:   Unstable angina   Precordial chest pain       ED Disposition  ED Disposition       ED Disposition   AMA    Condition   --    Comment   --               Sai Mukherjee III, MD  [address]  [address]  [place] [address]  [phone]    Today      Tigre Cates MD  [address] [facility]   [place] [address]  [phone]    Today           Medication List        New Prescriptions      nitroglycerin 0.4 MG SL tablet  Commonly known as: NITROSTAT  Place 1 tablet under the tongue Every 5 (Five) Minutes As Needed for Chest Pain. Take no more than 3 doses in 15 minutes.               Where to Get Your Medications        These medications were sent to [id number] - [place], [place] - [address] - [phone]  - [phone] [place] [address]      Phone: [phone]   nitroglycerin 0.4 MG SL tablet            Vicki Ledesma MD  08/11/23 0719       Vicki Ledesma MD  08/11/23 0727       Vicki Ledesma MD  08/11/23 0861

## 2023-08-11 NOTE — Clinical Note
First balloon inflation max pressure = 12 tomasz. First balloon inflation duration = 20 seconds. Second inflation of balloon - Max pressure = 12 tomasz. 2nd Inflation of balloon - Duration = 15 seconds. Third inflation of balloon - Max pressure = 12 tomasz. 3rd Inflation of balloon - Duration = 15 seconds.

## 2023-08-11 NOTE — Clinical Note
First balloon inflation max pressure = 14 tomasz. First balloon inflation duration = 10 seconds. Second inflation of balloon - Max pressure = 15 tomasz. 2nd Inflation of balloon - Duration = 10 seconds. Third inflation of balloon - Max pressure = 18 tomasz. 3rd Inflation of balloon - Duration = 10 seconds.

## 2023-08-11 NOTE — Clinical Note
First balloon inflation max pressure = 12 tomasz. First balloon inflation duration = 20 seconds. Second inflation of balloon - Max pressure = 18 tomasz. 2nd Inflation of balloon - Duration = 10 seconds.

## 2023-08-11 NOTE — SIGNIFICANT NOTE
"Went to see patient for admission and he unfortunately is leaving am. I explained to him that he has had a heart attack and that we highly recommended admission. He is uncomfortable with the telemetry, bp cuff, and multiple blood draws. He would like to leave ama. Explained that he could decompensate and even suffer death upon leaving. He verbalizes understanding and states \"if its my time, its my time\".  Dr. Ledesma advised him to call his cardiologist this am.   "

## 2023-08-11 NOTE — Clinical Note
First balloon inflation max pressure = 12 tomasz. First balloon inflation duration = 15 seconds. Second inflation of balloon - Max pressure = 12 tomasz. 2nd Inflation of balloon - Duration = 15 seconds. Third inflation of balloon - Max pressure = 12 tomasz. 3rd Inflation of balloon - Duration = 15 seconds. Fourth inflation of balloon - Max pressure = 14 tomasz. 4th Inflation of balloon - Duration = 20 seconds.

## 2023-08-11 NOTE — ED PROVIDER NOTES
[place]    EMERGENCY DEPARTMENT ENCOUNTER      Pt Name: Scott Milan  MRN: [id number]  YOB: 1950  Date of evaluation: 8/11/2023  Provider: Sanchez Crespo DO    CHIEF COMPLAINT       Chief Complaint   Patient presents with    Chest Pain         HISTORY OF PRESENT ILLNESS  (Location/Symptom, Timing/Onset, Context/Setting, Quality, Duration, Modifying Factors, Severity.)   Scott Milan is a 73 y.o. male who presents to the emergency department via EMS for evaluation of chest pain sharp and burning sensation with redness to his bilateral shoulders as well as his left arm.  The start about 1 hour prior to arrival.  He states he was here overnight early hours of the morning with similar symptoms, had a work-up and was recommended for admission to the hospital given his significant cardiac history but he decided to leave AGAINST MEDICAL ADVICE at that time.  He went home, was doing well throughout the day today until the recurrence of his chest pain this evening.  He has multiple stents, is on a baby aspirin, denies any nausea vomiting fever chills or difficulty breathing.  He does feel with his recurrent symptoms that admission at this time would probably be reasonable as he has not had a catheterization for over 5 years.  He does follow with Dr. Mukherjee with cardiology, denies any other acute systemic complaints.  States his chest pain has improved upon arrival to the emergency department this evening.  Has a history of COPD, wears nasal cannula oxygen 24/7, he does continue to smoke.      Nursing notes were reviewed.      PAST MEDICAL HISTORY     Past Medical History:   Diagnosis Date    Allergic rhinitis     seasonal    Atrial fibrillation     COPD (chronic obstructive pulmonary disease)     Coronary artery disease     History of cardiac catheterization     Hypercholesterolemia     Hyperlipidemia     Hypertension     Kidney stones     Recurrent chest pain     Tobacco abuse           SURGICAL HISTORY       Past Surgical History:   Procedure Laterality Date    CORONARY ANGIOPLASTY WITH STENT PLACEMENT           CURRENT MEDICATIONS       Current Facility-Administered Medications:     aspirin chewable tablet 324 mg, 324 mg, Oral, Once, Sanchez Crespo, DO    nitroglycerin (TRIDIL) 200 mcg/ml infusion, 5-200 mcg/min, Intravenous, Titrated, Sanchez Crespo, DO    sodium chloride 0.9 % flush 10 mL, 10 mL, Intravenous, PRN, Sanchez Crespo, DO    Current Outpatient Medications:     albuterol sulfate  (90 Base) MCG/ACT inhaler, As Needed., Disp: , Rfl:     amLODIPine (NORVASC) 5 MG tablet, Take 1 tablet by mouth Daily. Need lab work and f/u appt for further refills., Disp: 90 tablet, Rfl: 0    aspirin 81 MG tablet, Take 81 mg by mouth Daily., Disp: , Rfl:     HYDROcodone-acetaminophen (NORCO) 7.5-325 MG per tablet, Take 1 tablet by mouth Every 6 (Six) Hours As Needed for Moderate Pain  or Severe Pain ., Disp: 12 tablet, Rfl: 0    metFORMIN (GLUCOPHAGE) 500 MG tablet, Take 500 mg by mouth 2 (Two) Times a Day., Disp: , Rfl:     nitroglycerin (NITROSTAT) 0.4 MG SL tablet, Place 1 tablet under the tongue Every 5 (Five) Minutes As Needed for Chest Pain. Take no more than 3 doses in 15 minutes., Disp: 100 tablet, Rfl: 0    ondansetron ODT (ZOFRAN-ODT) 4 MG disintegrating tablet, Place 1 tablet on the tongue Every 8 (Eight) Hours As Needed for Nausea or Vomiting., Disp: 12 tablet, Rfl: 0    rosuvastatin (CRESTOR) 20 MG tablet, Take 1 tablet by mouth Daily., Disp: 90 tablet, Rfl: 1    sotalol (BETAPACE) 80 MG tablet, TAKE ONE (1) TABLET BY MOUTH TWICE A DAY , Disp: 180 tablet, Rfl: 0    Spiriva HandiHaler 18 MCG per inhalation capsule, As Needed., Disp: , Rfl:     tamsulosin (FLOMAX) 0.4 MG capsule 24 hr capsule, Daily., Disp: , Rfl:     ALLERGIES     Effient [prasugrel], Clopidogrel, and Prednisone    FAMILY HISTORY       Family History   Problem Relation Age of Onset    No  "Known Problems Mother     No Known Problems Father           SOCIAL HISTORY       Social History     Socioeconomic History    Marital status:    Tobacco Use    Smoking status: Every Day     Packs/day: 2.00     Types: Cigarettes    Smokeless tobacco: Never   Vaping Use    Vaping Use: Never used   Substance and Sexual Activity    Alcohol use: No    Drug use: No    Sexual activity: Defer         PHYSICAL EXAM    (up to 7 for level 4, 8 or more for level 5)     Vitals:    08/11/23 1819 08/11/23 1825   BP:  117/65   BP Location:  Left arm   Patient Position:  Lying   Pulse: 88    Resp: 21    Temp:  98.1 øF (36.7 øC)   SpO2: 96%    Weight: 86.6 kg (191 lb)    Height: 177.8 cm (70\")        Physical Exam  General : Patient is awake, alert, oriented, in no acute distress, nontoxic appearing  HEENT: Pupils are equally round, EOMI, conjunctivae clear  Neck: Neck is supple, full range of motion, trachea midline  Cardiac: Heart regular rate, rhythm, no murmurs, rubs, or gallops  Lungs: Lungs are clear to auscultation, there is no wheezing, rhonchi, or rales. There is no use of accessory muscles, nasal cannula oxygen in place, pulse ox 97%.  Chest wall: There is no tenderness to palpation over the chest wall or over ribs  Abdomen: Abdomen is soft, nontender, nondistended. There are no firm or pulsatile masses, no rebound rigidity or guarding  Musculoskeletal: 5 out of 5 strength in all 4 extremities.  No focal muscle deficits are appreciated  Neuro: Motor intact, sensory intact, level of consciousness is normal  Dermatology: Skin is warm and dry  Psych: Mentation is grossly normal, cognition is grossly normal. Affect is appropriate      DIAGNOSTIC RESULTS     EKG:  All EKGs are interpreted by the Emergency Department Physician who either signs or Co-signs this chart in the absence of a cardiologist.    ECG 12 Lead ED Triage Standing Order; Chest Pain   Preliminary Result   Test Reason : ED Triage Standing Order~   Blood " Pressure :   */*   mmHG   Vent. Rate :  87 BPM     Atrial Rate :  87 BPM      P-R Int : 158 ms          QRS Dur : 104 ms       QT Int : 406 ms       P-R-T Axes :  55  61  41 degrees      QTc Int : 488 ms      Normal sinus rhythm   Incomplete right bundle branch block   Prolonged QT   Abnormal ECG   When compared with ECG of 11-AUG-2023 04:57, (Unconfirmed)   No significant change was found      Referred By: ERMD           Confirmed By:       ECG 12 Lead ED Triage Standing Order; Chest Pain    (Results Pending)       RADIOLOGY:     [] Radiologist's Report Reviewed:  XR Chest 1 View   Final Result   Impression:   Very mild bibasilar atelectasis. No other evidence of active chest pathology.         Electronically Signed: Bernabe Ceja MD     8/11/2023 7:02 PM EDT     Workstation ID: [id number]          I ordered and independently reviewed the above noted radiographic studies.      I viewed images of chest x-ray which showed no acute cardiopulmonary process per my independent interpretation.    See radiologist's dictation for official interpretation.      ED BEDSIDE ULTRASOUND:   Performed by ED Physician - none    LABS:    I have reviewed and interpreted all of the currently available lab results from this visit (if applicable):  Results for orders placed or performed during the hospital encounter of 08/11/23   High Sensitivity Troponin T    Specimen: Blood   Result Value Ref Range    HS Troponin T 38 (H) <15 ng/L   Comprehensive Metabolic Panel    Specimen: Blood   Result Value Ref Range    Glucose 190 (H) 65 - 99 mg/dL    BUN 19 8 - 23 mg/dL    Creatinine 0.74 (L) 0.76 - 1.27 mg/dL    Sodium 141 136 - 145 mmol/L    Potassium 3.7 3.5 - 5.2 mmol/L    Chloride 106 98 - 107 mmol/L    CO2 23.0 22.0 - 29.0 mmol/L    Calcium 8.6 8.6 - 10.5 mg/dL    Total Protein 6.5 6.0 - 8.5 g/dL    Albumin 4.0 3.5 - 5.2 g/dL    ALT (SGPT) 15 1 - 41 U/L    AST (SGOT) 15 1 - 40 U/L    Alkaline Phosphatase 81 39 - 117 U/L    Total Bilirubin 0.3  0.0 - 1.2 mg/dL    Globulin 2.5 gm/dL    A/G Ratio 1.6 g/dL    BUN/Creatinine Ratio 25.7 (H) 7.0 - 25.0    Anion Gap 12.0 5.0 - 15.0 mmol/L    eGFR 94.5 >60.0 mL/min/1.73   Lipase    Specimen: Blood   Result Value Ref Range    Lipase 25 13 - 60 U/L   BNP    Specimen: Blood   Result Value Ref Range    proBNP 205.6 0.0 - 1,800.0 pg/mL   CBC Auto Differential    Specimen: Blood   Result Value Ref Range    WBC 7.93 3.40 - 10.80 10*3/mm3    RBC 4.08 (L) 4.14 - 5.80 10*6/mm3    Hemoglobin 12.4 (L) 13.0 - 17.7 g/dL    Hematocrit 37.6 37.5 - 51.0 %    MCV 92.2 79.0 - 97.0 fL    MCH 30.4 26.6 - 33.0 pg    MCHC 33.0 31.5 - 35.7 g/dL    RDW 12.7 12.3 - 15.4 %    RDW-SD 42.7 37.0 - 54.0 fl    MPV 10.1 6.0 - 12.0 fL    Platelets 194 140 - 450 10*3/mm3    Neutrophil % 65.4 42.7 - 76.0 %    Lymphocyte % 19.5 (L) 19.6 - 45.3 %    Monocyte % 9.6 5.0 - 12.0 %    Eosinophil % 4.9 0.3 - 6.2 %    Basophil % 0.5 0.0 - 1.5 %    Immature Grans % 0.1 0.0 - 0.5 %    Neutrophils, Absolute 5.18 1.70 - 7.00 10*3/mm3    Lymphocytes, Absolute 1.55 0.70 - 3.10 10*3/mm3    Monocytes, Absolute 0.76 0.10 - 0.90 10*3/mm3    Eosinophils, Absolute 0.39 0.00 - 0.40 10*3/mm3    Basophils, Absolute 0.04 0.00 - 0.20 10*3/mm3    Immature Grans, Absolute 0.01 0.00 - 0.05 10*3/mm3    nRBC 0.0 0.0 - 0.2 /100 WBC   ECG 12 Lead ED Triage Standing Order; Chest Pain   Result Value Ref Range    QT Interval 406 ms    QTC Interval 488 ms        If labs were ordered, I independently reviewed the results and considered them in treating the patient.      EMERGENCY DEPARTMENT COURSE and DIFFERENTIAL DIAGNOSIS/MDM:   Vitals:  AS OF 19:19 EDT    BP - 117/65  HR - 88  TEMP - 98.1 øF (36.7 øC)  O2 SATS - 96%      Orders placed during this visit:  Orders Placed This Encounter   Procedures    XR Chest 1 View    [place] Draw    High Sensitivity Troponin T    Comprehensive Metabolic Panel    Lipase    BNP    CBC Auto Differential    NPO Diet NPO Type: Strict NPO    Undress & Gown     Continuous Pulse Oximetry    Oxygen Therapy- Nasal Cannula; Titrate 1-6 LPM Per SpO2; 90 - 95%    ECG 12 Lead ED Triage Standing Order; Chest Pain    ECG 12 Lead ED Triage Standing Order; Chest Pain    Insert Peripheral IV    CBC & Differential    Green Top (Gel)    Lavender Top    Gold Top - SST    Gray Top    Light Blue Top       All labs have been independently reviewed by me.  All radiology studies have been reviewed by me and the radiologist dictating the report.  All EKG's have been independently viewed and interpreted by me.      Discussion below represents my analysis of pertinent findings related to patient's condition, differential diagnosis, treatment plan and final disposition.    Differential diagnosis:  The differential diagnosis associated with the patient's presentation includes: Angina, coronary disease, pneumonia, paroxysmal A-fib    Additional sources  Discussed/ obtained information from independent historians:   [] Spouse  [] Parent  [] Family member  [] Friend  [x] EMS   [] Other:    External (non-ED) record review:   [x] Inpatient record:   [] Office record:   [] Outpatient record:   [] Prior Outpatient labs:   [] Prior Outpatient radiology:   [] Primary Care record:   [] Outside ED record:   [] Other:     Patient's care impacted by:   [] Diabetes  [x] Hypertension  [] CHF  [] Hyperlipidemia  [x] Coronary Artery Disease   [] COPD   [] Cancer   [x] Tobacco Abuse   [] Substance Abuse    [] Other:     Care significantly affected by Social Determinants of Health (housing and economic circumstances, unemployment)    [] Yes     [x] No   If yes, Patient's care significantly limited by Social Determinants of Health including:   [] Inadequate housing   [] Low income   [] Alcoholism and drug addiction in family   [] Problems related to primary support group   [] Unemployment   [] Problems related to employment   [] Other Social Determinants of Health:       MEDICATIONS ADMINISTERED IN  ED:  Medications   sodium chloride 0.9 % flush 10 mL (has no administration in time range)   aspirin chewable tablet 324 mg (324 mg Oral Not Given 8/11/23 1822)   nitroglycerin (TRIDIL) 200 mcg/ml infusion (has no administration in time range)       ED Course as of 08/11/23 1919   Fri Aug 11, 2023   1916 HEART Pathway for Early Discharge in Acute Chest Pain from [facility] [employer]  on 8/11/2023  ** All calculations should be rechecked by clinician prior to use **    RESULT SUMMARY:  7 points  HEART Pathway Score    High risk  12-65% 30-day MACE    Cardiology consultation and admission recommended. Further testing indicated.      INPUTS:  History -> 1 = Moderately suspicious  EKG -> 1 = Non-specific repolarization disturbance  Age -> 2 = =63  Risk factors -> 2 = =3 risk factors or history of atherosclerotic disease  Initial troponin -> 1 = 1-3x normal limit   [AP]      ED Course User Index  [AP] Sanchez Crespo, DO         This is a 73-year-old male with a history of coronary disease who presents for recurrent chest pain, anginal symptoms.  Seen her earlier this morning extended admission but he refused and left AGAINST MEDICAL ADVICE, returns this evening with recurrent chest pain.  Symptoms improved upon arrival to the emergency department, no acute ischemic changes on his EKG, his vital signs are stable.  He has a significant cardiac history.  We discussed moving forward with IV, labs and imaging for further assessment.  Patient blood work is stable, his high-sensitivity troponin is 38, down from 50 earlier today.  Still with his cardiac history, heart score 7, recurrent chest pain with known coronary disease patient was started on nitro infusion, we will plan for admission in the hospital for further work-up, case discussed with hospitalist, Dr. Palencia, for admission.  Patient is agreeable to this plan.    PROCEDURES:  Procedures    CRITICAL CARE TIME    Total Critical Care time was 0 minutes, excluding separately  reportable procedures.   There was a high probability of clinically significant/life threatening deterioration in the patient's condition which required my urgent intervention.      FINAL IMPRESSION      1. Chest pain, unspecified type    2. Coronary artery disease involving native heart with other form of angina pectoris, unspecified vessel or lesion type          DISPOSITION/PLAN     ED Disposition       ED Disposition   Decision to Admit    Condition   --    Comment   --             Comment: Please note this report has been produced using speech recognition software.      Sanchez Crespo DO  Attending Emergency Physician         Sanchez Crespo DO  08/11/23 [id number]

## 2023-08-11 NOTE — Clinical Note
First balloon inflation max pressure = 10 tomasz. First balloon inflation duration = 15 seconds. Second inflation of balloon - Max pressure = 14 tomasz. 2nd Inflation of balloon - Duration = 15 seconds. Third inflation of balloon - Max pressure = 14 tomasz. 3rd Inflation of balloon - Duration = 15 seconds. Fourth inflation of balloon - Max pressure = 14 tomasz. 4th Inflation of balloon - Duration = 15 seconds.

## 2023-08-11 NOTE — Clinical Note
Hemostasis started on the left radial artery. R-Band was used in achieving hemostasis. Radial compression device applied to vessel. Hemostasis achieved successfully. Closure device additional comment: TR- 12 ML

## 2023-08-11 NOTE — Clinical Note
Hemostasis started on the right radial artery. R-Band was used in achieving hemostasis. Radial compression device applied to vessel. Hemostasis achieved successfully. Closure device additional comment: 10 cc air

## 2023-08-12 ENCOUNTER — APPOINTMENT (OUTPATIENT)
Dept: CARDIOLOGY | Facility: HOSPITAL | Age: 73
DRG: 247 | End: 2023-08-12
Payer: MEDICARE

## 2023-08-12 LAB
APTT PPP: 30.1 SECONDS (ref 60–90)
BASOPHILS # BLD AUTO: 0.04 10*3/MM3 (ref 0–0.2)
BASOPHILS NFR BLD AUTO: 0.5 % (ref 0–1.5)
BH CV ECHO MEAS - AO MAX PG: 5.7 MMHG
BH CV ECHO MEAS - AO MEAN PG: 3 MMHG
BH CV ECHO MEAS - AO ROOT DIAM: 3.3 CM
BH CV ECHO MEAS - AO V2 MAX: 119 CM/SEC
BH CV ECHO MEAS - AO V2 VTI: 24.4 CM
BH CV ECHO MEAS - AVA(I,D): 2.7 CM2
BH CV ECHO MEAS - EDV(CUBED): 132.7 ML
BH CV ECHO MEAS - EDV(MOD-SP2): 90.3 ML
BH CV ECHO MEAS - EDV(MOD-SP4): 125 ML
BH CV ECHO MEAS - EF(MOD-BP): 51.5 %
BH CV ECHO MEAS - EF(MOD-SP2): 47.1 %
BH CV ECHO MEAS - EF(MOD-SP4): 55.7 %
BH CV ECHO MEAS - ESV(CUBED): 29.8 ML
BH CV ECHO MEAS - ESV(MOD-SP2): 47.8 ML
BH CV ECHO MEAS - ESV(MOD-SP4): 55.4 ML
BH CV ECHO MEAS - FS: 39.2 %
BH CV ECHO MEAS - IVS/LVPW: 0.89 CM
BH CV ECHO MEAS - IVSD: 0.8 CM
BH CV ECHO MEAS - LA DIMENSION: 3.9 CM
BH CV ECHO MEAS - LAT PEAK E' VEL: 12 CM/SEC
BH CV ECHO MEAS - LV DIASTOLIC VOL/BSA (35-75): 60.9 CM2
BH CV ECHO MEAS - LV MASS(C)D: 151.8 GRAMS
BH CV ECHO MEAS - LV MAX PG: 4.6 MMHG
BH CV ECHO MEAS - LV MEAN PG: 2 MMHG
BH CV ECHO MEAS - LV SYSTOLIC VOL/BSA (12-30): 27 CM2
BH CV ECHO MEAS - LV V1 MAX: 107 CM/SEC
BH CV ECHO MEAS - LV V1 VTI: 21 CM
BH CV ECHO MEAS - LVIDD: 5.1 CM
BH CV ECHO MEAS - LVIDS: 3.1 CM
BH CV ECHO MEAS - LVOT AREA: 3.1 CM2
BH CV ECHO MEAS - LVOT DIAM: 2 CM
BH CV ECHO MEAS - LVPWD: 0.9 CM
BH CV ECHO MEAS - MED PEAK E' VEL: 10.8 CM/SEC
BH CV ECHO MEAS - MV A MAX VEL: 138 CM/SEC
BH CV ECHO MEAS - MV DEC SLOPE: 670 CM/SEC2
BH CV ECHO MEAS - MV DEC TIME: 0.2 MSEC
BH CV ECHO MEAS - MV E MAX VEL: 94.6 CM/SEC
BH CV ECHO MEAS - MV E/A: 0.69
BH CV ECHO MEAS - MV MAX PG: 7.1 MMHG
BH CV ECHO MEAS - MV MEAN PG: 4 MMHG
BH CV ECHO MEAS - MV P1/2T: 55.5 MSEC
BH CV ECHO MEAS - MV V2 VTI: 31.8 CM
BH CV ECHO MEAS - MVA(P1/2T): 4 CM2
BH CV ECHO MEAS - MVA(VTI): 2.08 CM2
BH CV ECHO MEAS - PA ACC TIME: 0.14 SEC
BH CV ECHO MEAS - RAP SYSTOLE: 3 MMHG
BH CV ECHO MEAS - RVSP: 10 MMHG
BH CV ECHO MEAS - SI(MOD-SP2): 20.7 ML/M2
BH CV ECHO MEAS - SI(MOD-SP4): 33.9 ML/M2
BH CV ECHO MEAS - SV(LVOT): 66 ML
BH CV ECHO MEAS - SV(MOD-SP2): 42.5 ML
BH CV ECHO MEAS - SV(MOD-SP4): 69.6 ML
BH CV ECHO MEAS - TAPSE (>1.6): 2.44 CM
BH CV ECHO MEAS - TR MAX PG: 7 MMHG
BH CV ECHO MEAS - TR MAX VEL: 129.3 CM/SEC
BH CV ECHO MEASUREMENTS AVERAGE E/E' RATIO: 8.3
BH CV VAS BP RIGHT ARM: NORMAL MMHG
BH CV XLRA - RV BASE: 3.7 CM
BH CV XLRA - RV LENGTH: 9.7 CM
BH CV XLRA - RV MID: 2.5 CM
BH CV XLRA - TDI S': 15.4 CM/SEC
DEPRECATED RDW RBC AUTO: 41.6 FL (ref 37–54)
DEPRECATED RDW RBC AUTO: 42.1 FL (ref 37–54)
EOSINOPHIL # BLD AUTO: 0.43 10*3/MM3 (ref 0–0.4)
EOSINOPHIL NFR BLD AUTO: 4.8 % (ref 0.3–6.2)
ERYTHROCYTE [DISTWIDTH] IN BLOOD BY AUTOMATED COUNT: 12.6 % (ref 12.3–15.4)
ERYTHROCYTE [DISTWIDTH] IN BLOOD BY AUTOMATED COUNT: 12.7 % (ref 12.3–15.4)
GEN 5 2HR TROPONIN T REFLEX: 41 NG/L
GLUCOSE BLDC GLUCOMTR-MCNC: 116 MG/DL (ref 70–130)
GLUCOSE BLDC GLUCOMTR-MCNC: 151 MG/DL (ref 70–130)
GLUCOSE BLDC GLUCOMTR-MCNC: 189 MG/DL (ref 70–130)
GLUCOSE BLDC GLUCOMTR-MCNC: 192 MG/DL (ref 70–130)
GLUCOSE BLDC GLUCOMTR-MCNC: 218 MG/DL (ref 70–130)
HCT VFR BLD AUTO: 36.9 % (ref 37.5–51)
HCT VFR BLD AUTO: 38.9 % (ref 37.5–51)
HGB BLD-MCNC: 12.1 G/DL (ref 13–17.7)
HGB BLD-MCNC: 12.6 G/DL (ref 13–17.7)
IMM GRANULOCYTES # BLD AUTO: 0.03 10*3/MM3 (ref 0–0.05)
IMM GRANULOCYTES NFR BLD AUTO: 0.3 % (ref 0–0.5)
INR PPP: 1 (ref 0.89–1.12)
LEFT ATRIUM VOLUME INDEX: 17.8 ML/M2
LV EF 2D ECHO EST: 55 %
LYMPHOCYTES # BLD AUTO: 1.87 10*3/MM3 (ref 0.7–3.1)
LYMPHOCYTES NFR BLD AUTO: 21.1 % (ref 19.6–45.3)
MCH RBC QN AUTO: 29.5 PG (ref 26.6–33)
MCH RBC QN AUTO: 29.9 PG (ref 26.6–33)
MCHC RBC AUTO-ENTMCNC: 32.4 G/DL (ref 31.5–35.7)
MCHC RBC AUTO-ENTMCNC: 32.8 G/DL (ref 31.5–35.7)
MCV RBC AUTO: 91.1 FL (ref 79–97)
MCV RBC AUTO: 91.1 FL (ref 79–97)
MONOCYTES # BLD AUTO: 0.81 10*3/MM3 (ref 0.1–0.9)
MONOCYTES NFR BLD AUTO: 9.1 % (ref 5–12)
NEUTROPHILS NFR BLD AUTO: 5.7 10*3/MM3 (ref 1.7–7)
NEUTROPHILS NFR BLD AUTO: 64.2 % (ref 42.7–76)
NRBC BLD AUTO-RTO: 0 /100 WBC (ref 0–0.2)
PLATELET # BLD AUTO: 193 10*3/MM3 (ref 140–450)
PLATELET # BLD AUTO: 205 10*3/MM3 (ref 140–450)
PMV BLD AUTO: 10.3 FL (ref 6–12)
PMV BLD AUTO: 10.5 FL (ref 6–12)
PROTHROMBIN TIME: 13.3 SECONDS (ref 12.2–14.5)
QT INTERVAL: 406 MS
QT INTERVAL: 412 MS
QT INTERVAL: 420 MS
QT INTERVAL: 422 MS
QTC INTERVAL: 463 MS
QTC INTERVAL: 488 MS
QTC INTERVAL: 490 MS
QTC INTERVAL: 493 MS
RBC # BLD AUTO: 4.05 10*6/MM3 (ref 4.14–5.8)
RBC # BLD AUTO: 4.27 10*6/MM3 (ref 4.14–5.8)
TROPONIN T DELTA: 5 NG/L
TROPONIN T SERPL HS-MCNC: 36 NG/L
UFH PPP CHRO-ACNC: 0.1 IU/ML (ref 0.3–0.7)
UFH PPP CHRO-ACNC: 0.15 IU/ML (ref 0.3–0.7)
WBC NRBC COR # BLD: 8.88 10*3/MM3 (ref 3.4–10.8)
WBC NRBC COR # BLD: 9.98 10*3/MM3 (ref 3.4–10.8)

## 2023-08-12 PROCEDURE — 85730 THROMBOPLASTIN TIME PARTIAL: CPT | Performed by: NURSE PRACTITIONER

## 2023-08-12 PROCEDURE — 85520 HEPARIN ASSAY: CPT | Performed by: NURSE PRACTITIONER

## 2023-08-12 PROCEDURE — 94799 UNLISTED PULMONARY SVC/PX: CPT

## 2023-08-12 PROCEDURE — 25010000002 MORPHINE PER 10 MG: Performed by: FAMILY MEDICINE

## 2023-08-12 PROCEDURE — 85027 COMPLETE CBC AUTOMATED: CPT | Performed by: STUDENT IN AN ORGANIZED HEALTH CARE EDUCATION/TRAINING PROGRAM

## 2023-08-12 PROCEDURE — 85025 COMPLETE CBC W/AUTO DIFF WBC: CPT | Performed by: NURSE PRACTITIONER

## 2023-08-12 PROCEDURE — 94664 DEMO&/EVAL PT USE INHALER: CPT

## 2023-08-12 PROCEDURE — 93005 ELECTROCARDIOGRAM TRACING: CPT | Performed by: FAMILY MEDICINE

## 2023-08-12 PROCEDURE — 94640 AIRWAY INHALATION TREATMENT: CPT

## 2023-08-12 PROCEDURE — 84484 ASSAY OF TROPONIN QUANT: CPT | Performed by: STUDENT IN AN ORGANIZED HEALTH CARE EDUCATION/TRAINING PROGRAM

## 2023-08-12 PROCEDURE — 82948 REAGENT STRIP/BLOOD GLUCOSE: CPT

## 2023-08-12 PROCEDURE — 85610 PROTHROMBIN TIME: CPT | Performed by: NURSE PRACTITIONER

## 2023-08-12 PROCEDURE — 93306 TTE W/DOPPLER COMPLETE: CPT | Performed by: INTERNAL MEDICINE

## 2023-08-12 PROCEDURE — 25010000002 MORPHINE PER 10 MG: Performed by: NURSE PRACTITIONER

## 2023-08-12 PROCEDURE — 63710000001 INSULIN LISPRO (HUMAN) PER 5 UNITS: Performed by: FAMILY MEDICINE

## 2023-08-12 PROCEDURE — 25010000002 HEPARIN (PORCINE) 25000-0.45 UT/250ML-% SOLUTION: Performed by: NURSE PRACTITIONER

## 2023-08-12 PROCEDURE — 25010000002 NITROGLYCERIN 200 MCG/ML SOLUTION: Performed by: FAMILY MEDICINE

## 2023-08-12 PROCEDURE — 99232 SBSQ HOSP IP/OBS MODERATE 35: CPT | Performed by: STUDENT IN AN ORGANIZED HEALTH CARE EDUCATION/TRAINING PROGRAM

## 2023-08-12 PROCEDURE — 93306 TTE W/DOPPLER COMPLETE: CPT

## 2023-08-12 PROCEDURE — 25010000002 HEPARIN (PORCINE) PER 1000 UNITS

## 2023-08-12 PROCEDURE — 99222 1ST HOSP IP/OBS MODERATE 55: CPT | Performed by: INTERNAL MEDICINE

## 2023-08-12 PROCEDURE — 85520 HEPARIN ASSAY: CPT

## 2023-08-12 PROCEDURE — 93010 ELECTROCARDIOGRAM REPORT: CPT | Performed by: INTERNAL MEDICINE

## 2023-08-12 RX ORDER — HEPARIN SODIUM 10000 [USP'U]/100ML
14 INJECTION, SOLUTION INTRAVENOUS
Status: DISCONTINUED | OUTPATIENT
Start: 2023-08-12 | End: 2023-08-13

## 2023-08-12 RX ORDER — HEPARIN SODIUM 1000 [USP'U]/ML
2000 INJECTION, SOLUTION INTRAVENOUS; SUBCUTANEOUS AS NEEDED
Status: DISCONTINUED | OUTPATIENT
Start: 2023-08-12 | End: 2023-08-12

## 2023-08-12 RX ORDER — HEPARIN SODIUM 1000 [USP'U]/ML
2000 INJECTION, SOLUTION INTRAVENOUS; SUBCUTANEOUS ONCE
Status: COMPLETED | OUTPATIENT
Start: 2023-08-12 | End: 2023-08-12

## 2023-08-12 RX ORDER — MORPHINE SULFATE 2 MG/ML
1 INJECTION, SOLUTION INTRAMUSCULAR; INTRAVENOUS ONCE
Status: COMPLETED | OUTPATIENT
Start: 2023-08-12 | End: 2023-08-12

## 2023-08-12 RX ORDER — PANTOPRAZOLE SODIUM 40 MG/10ML
40 INJECTION, POWDER, LYOPHILIZED, FOR SOLUTION INTRAVENOUS
Status: DISCONTINUED | OUTPATIENT
Start: 2023-08-12 | End: 2023-08-13

## 2023-08-12 RX ORDER — HEPARIN SODIUM 1000 [USP'U]/ML
4000 INJECTION, SOLUTION INTRAVENOUS; SUBCUTANEOUS AS NEEDED
Status: DISCONTINUED | OUTPATIENT
Start: 2023-08-12 | End: 2023-08-12

## 2023-08-12 RX ADMIN — ALUMINUM HYDROXIDE, MAGNESIUM HYDROXIDE, AND DIMETHICONE 15 ML: 400; 400; 40 SUSPENSION ORAL at 01:52

## 2023-08-12 RX ADMIN — HEPARIN SODIUM 11.7 UNITS/KG/HR: 10000 INJECTION, SOLUTION INTRAVENOUS at 10:43

## 2023-08-12 RX ADMIN — IPRATROPIUM BROMIDE AND ALBUTEROL SULFATE 3 ML: 2.5; .5 SOLUTION RESPIRATORY (INHALATION) at 19:54

## 2023-08-12 RX ADMIN — SOTALOL HYDROCHLORIDE 80 MG: 80 TABLET ORAL at 08:33

## 2023-08-12 RX ADMIN — HEPARIN SODIUM 2000 UNITS: 1000 INJECTION INTRAVENOUS; SUBCUTANEOUS at 18:13

## 2023-08-12 RX ADMIN — SOTALOL HYDROCHLORIDE 80 MG: 80 TABLET ORAL at 21:17

## 2023-08-12 RX ADMIN — Medication 10 ML: at 21:19

## 2023-08-12 RX ADMIN — HYDROCODONE BITARTRATE AND ACETAMINOPHEN 1 TABLET: 7.5; 325 TABLET ORAL at 12:29

## 2023-08-12 RX ADMIN — MORPHINE SULFATE 1 MG: 2 INJECTION, SOLUTION INTRAMUSCULAR; INTRAVENOUS at 01:59

## 2023-08-12 RX ADMIN — IPRATROPIUM BROMIDE AND ALBUTEROL SULFATE 3 ML: 2.5; .5 SOLUTION RESPIRATORY (INHALATION) at 07:58

## 2023-08-12 RX ADMIN — TAMSULOSIN HYDROCHLORIDE 0.4 MG: 0.4 CAPSULE ORAL at 08:33

## 2023-08-12 RX ADMIN — PANTOPRAZOLE SODIUM 40 MG: 40 INJECTION, POWDER, LYOPHILIZED, FOR SOLUTION INTRAVENOUS at 15:18

## 2023-08-12 RX ADMIN — SENNOSIDES AND DOCUSATE SODIUM 2 TABLET: 50; 8.6 TABLET ORAL at 21:17

## 2023-08-12 RX ADMIN — MORPHINE SULFATE 1 MG: 2 INJECTION, SOLUTION INTRAMUSCULAR; INTRAVENOUS at 15:02

## 2023-08-12 RX ADMIN — NITROGLYCERIN 40 MCG/MIN: 20 INJECTION INTRAVENOUS at 23:25

## 2023-08-12 RX ADMIN — LISINOPRIL 5 MG: 5 TABLET ORAL at 08:34

## 2023-08-12 RX ADMIN — ASPIRIN 81 MG: 81 TABLET, COATED ORAL at 08:33

## 2023-08-12 RX ADMIN — AMLODIPINE BESYLATE 5 MG: 5 TABLET ORAL at 08:33

## 2023-08-12 RX ADMIN — IPRATROPIUM BROMIDE AND ALBUTEROL SULFATE 3 ML: 2.5; .5 SOLUTION RESPIRATORY (INHALATION) at 13:25

## 2023-08-12 RX ADMIN — MORPHINE SULFATE 1 MG: 2 INJECTION, SOLUTION INTRAMUSCULAR; INTRAVENOUS at 16:31

## 2023-08-12 RX ADMIN — ALUMINUM HYDROXIDE, MAGNESIUM HYDROXIDE, AND DIMETHICONE: 400; 400; 40 SUSPENSION ORAL at 15:18

## 2023-08-12 RX ADMIN — HYDROCODONE BITARTRATE AND ACETAMINOPHEN 1 TABLET: 7.5; 325 TABLET ORAL at 18:21

## 2023-08-12 RX ADMIN — IPRATROPIUM BROMIDE AND ALBUTEROL SULFATE 3 ML: 2.5; .5 SOLUTION RESPIRATORY (INHALATION) at 16:18

## 2023-08-12 RX ADMIN — HYDROCODONE BITARTRATE AND ACETAMINOPHEN 1 TABLET: 7.5; 325 TABLET ORAL at 05:42

## 2023-08-12 RX ADMIN — SENNOSIDES AND DOCUSATE SODIUM 2 TABLET: 50; 8.6 TABLET ORAL at 08:33

## 2023-08-12 RX ADMIN — ROSUVASTATIN 40 MG: 20 TABLET, FILM COATED ORAL at 10:21

## 2023-08-12 NOTE — PROGRESS NOTES
[facility] Medicine Services  PROGRESS NOTE    Patient Name: Scott Milan  : 1950  MRN: [id number]    Date of Admission: 2023  Primary Care Physician: Tigre Cates MD    Subjective   Subjective     CC:  CP    HPI:  Resting comfortably without chest pain currently. Family at bedside. Wanting to smoke. We discussed smoking cessation    ROS:  Gen- No fevers, chills  CV- No chest pain, palpitations  Resp- No cough, dyspnea  GI- No N/V/D, abd pain       Objective   Objective     Vital Signs:   Temp:  [97.6 øF (36.4 øC)-98.3 øF (36.8 øC)] 98.3 øF (36.8 øC)  Heart Rate:  [69-88] 79  Resp:  [16-21] 16  BP: (102-142)/(52-82) 134/78  Flow (L/min):  [2-3] 2     Physical Exam:  Constitutional: No acute distress, awake, alert  HENT: NCAT, mucous membranes moist  Respiratory: Clear to auscultation bilaterally, respiratory effort normal   Cardiovascular: RRR, no murmurs, rubs, or gallops  Gastrointestinal: Positive bowel sounds, soft, nontender, nondistended  Musculoskeletal: No bilateral ankle edema  Psychiatric: Appropriate affect, cooperative  Neurologic: Oriented x 3, no focal neuro deficits  Skin: No rashes      Results Reviewed:  LAB RESULTS:      Lab 23  0235   WBC 7.93 9.06   HEMOGLOBIN 12.4* 13.3   HEMATOCRIT 37.6 40.5   PLATELETS 194 237   NEUTROS ABS 5.18 5.76   IMMATURE GRANS (ABS) 0.01 0.04   LYMPHS ABS 1.55 1.84   MONOS ABS 0.76 0.92*   EOS ABS 0.39 0.46*   MCV 92.2 89.6   D DIMER QUANT  --  0.38         Lab 23  0235   SODIUM 141 143   POTASSIUM 3.7 3.6   CHLORIDE 106 106   CO2 23.0 24.0   ANION GAP 12.0 13.0   BUN 19 18   CREATININE 0.74* 0.90   EGFR 94.5 89.1   GLUCOSE 190* 234*   CALCIUM 8.6 9.2   HEMOGLOBIN A1C  --  6.90*   TSH  --  2.420         Lab 23  1822 23  0235   TOTAL PROTEIN 6.5 6.8   ALBUMIN 4.0 4.0   GLOBULIN 2.5 2.8   ALT (SGPT) 15 18   AST (SGOT) 15 19   BILIRUBIN 0.3 0.4   ALK PHOS 81 87    LIPASE 25 21         Lab 08/11/23  1822 08/11/23  0459 08/11/23  0235   PROBNP 205.6  --  78.9   HSTROP T 38* 50* 22*         Lab 08/11/23  0235   CHOLESTEROL 98   LDL CHOL 34   HDL CHOL 29*   TRIGLYCERIDES 222*             Brief Urine Lab Results       None            Microbiology Results Abnormal       None            XR Chest 1 View    Result Date: 8/11/2023  XR CHEST 1 VW Date of Exam: 8/11/2023 6:20 PM EDT Indication: Chest Pain Triage Protocol Comparison: 8/11/2023 Findings: Heart mediastinum and pulmonary vasculature appear within normal limits. There is very mild bibasilar discoid atelectasis, a little increased from the prior study, but the lungs otherwise appear grossly clear. No edema, effusion or pneumothorax is seen.     Impression: Impression: Very mild bibasilar atelectasis. No other evidence of active chest pathology. Electronically Signed: Bernabe Ceja MD  8/11/2023 7:02 PM EDT  Workstation ID: [id number]    XR Chest 1 View    Result Date: 8/11/2023  XR CHEST 1 VW Date of Exam: 8/11/2023 2:41 AM EDT Indication: Chest Pain Triage Protocol Comparison: 11/16/2021. Findings: There is no pneumothorax, pleural effusion or focal airspace consolidation. Heart size and pulmonary vasculature appear within normal limits. Regional bones appear intact.     Impression: Impression: No acute cardiopulmonary abnormality. Electronically Signed: Abrahan Baugh MD  8/11/2023 2:59 AM EDT  Workstation ID: [id number]         Current medications:  Scheduled Meds:amLODIPine, 5 mg, Oral, Daily  aspirin, 324 mg, Oral, Once  aspirin, 81 mg, Oral, Daily  insulin lispro, 2-9 Units, Subcutaneous, 4x Daily AC & at Bedtime  ipratropium-albuterol, 3 mL, Nebulization, 4x Daily - RT  lisinopril, 5 mg, Oral, Daily  nicotine, 1 patch, Transdermal, Q24H  pharmacy consult - MTM, , Does not apply, Daily  rosuvastatin, 40 mg, Oral, Nightly  senna-docusate sodium, 2 tablet, Oral, BID  sodium chloride, 10 mL, Intravenous, Q12H  sotalol, 80 mg,  Oral, Q12H  tamsulosin, 0.4 mg, Oral, Daily      Continuous Infusions:heparin, 11.7 Units/kg/hr  nitroglycerin, 5-200 mcg/min, Last Rate: 10 mcg/min (08/12/23 0832)  Pharmacy to Dose Heparin,       PRN Meds:.  acetaminophen    aluminum-magnesium hydroxide-simethicone    senna-docusate sodium **AND** polyethylene glycol **AND** bisacodyl **AND** bisacodyl    Calcium Replacement - Follow Nurse / BPA Driven Protocol    dextrose    dextrose    glucagon (human recombinant)    HYDROcodone-acetaminophen    LORazepam    Magnesium Standard Dose Replacement - Follow Nurse / BPA Driven Protocol    Morphine **AND** naloxone    nitroglycerin    ondansetron    Pharmacy to Dose Heparin    Phosphorus Replacement - Follow Nurse / BPA Driven Protocol    Potassium Replacement - Follow Nurse / BPA Driven Protocol    sodium chloride    sodium chloride    Assessment & Plan   Assessment & Plan     Active Hospital Problems    Diagnosis  POA    **NSTEMI (non-ST elevated myocardial infarction) [I21.4]  Yes    Type 2 diabetes mellitus with hyperglycemia, without long-term current use of insulin [E11.65]  Yes    Atrial fibrillation [I48.91]  Yes    Coronary artery disease [I25.10]  Yes    Hypertension [I10]  Yes    Hypercholesterolemia [E78.00]  Yes    Tobacco abuse [Z72.0]  Yes    COPD (chronic obstructive pulmonary disease) [J44.9]  Yes      Resolved Hospital Problems   No resolved problems to display.        Brief Hospital Course to date:  Scott Milan is a 73 y.o. male with a history of CAD and prior stents, allergies to clopidogrel and Effient, hypertension, hyperlipidemia, COPD, tobacco abuse who is presenting with unstable angina with elevated troponins, admitted for NSTEMI.  Cardiology consulted and plans for left heart cath on Monday    NSTEMI / Unstable Angina  CAD with hx of prior stents  HTN  HL  - PRO = 6  - s/p ASA 325mg in ED  - ALLERGY to effient and plavix -- deferred DAPT load for now  - continue nitro gtt -- currently  CP free  - continue sotalol, crestor, lisinopril  --echo pending  - HS troponin now trending down  - cardiology to perform cardiac cath on Mon     NIDDM2  - hold metformin, SSI ac&hs  - A1c = 6.9%     Ongoing tobacco abuse   COPD  - nicotine patch  - smoking cessation education. Left AMA overnight and came back bc he was craving a cigarette. Long discussion abt smoking cessation today  - duo nebs QID    Expected Discharge Location and Transportation: home  Expected Discharge   Expected Discharge Date: 8/15/2023; Expected Discharge Time:      DVT prophylaxis:  Medical DVT prophylaxis orders are present.          CODE STATUS:   Code Status and Medical Interventions:   Ordered at: 08/11/23 2011     Code Status (Patient has no pulse and is not breathing):    CPR (Attempt to Resuscitate)     Medical Interventions (Patient has pulse or is breathing):    Full Support       Noreen Santos MD  08/12/23

## 2023-08-12 NOTE — PROGRESS NOTES
HEPARIN INFUSION  Scott Milan is a  73 y.o. male receiving heparin infusion.     Therapy for (VTE/Cardiac):   cardiac  Patient Weight: 85.6 kg  Initial Bolus (Y/N):   no  Any Bolus (Y/N):   yes        Signs or Symptoms of Bleeding: no    Cardiac or Other (Not VTE)   Initial Bolus: 60 units/kg (Max 4,000 units)  Initial rate: 12 units/kg/hr (Max 1,000 units/hr)   Anti Xa Rebolus Infusion Hold time Change infusion Dose (Units/kg/hr) Next Anti Xa or aPTT Level Due   < 0.11 50 Units/kg  (4000 Units Max) None Increase by  3 Units/kg/hr 6 hours   0.11- 0.19 25 Units/kg  (2000 Units Max) None Increase by  2 Units/kg/hr 6 hours   0.2 - 0.29 0 None Increase by  1 Units/kg/hr 6 hours   0.3 - 0.5 0 None No Change 6 hours (after 2 consecutive levels in range check qAM)   0.51 - 0.6 0 None Decrease by  1 Units/kg/hr 6 hours   0.61 - 0.8 0 30 Minutes Decrease by  2 Units/kg/hr 6 hours   0.81 - 1 0 60 Minutes Decrease by  3 Units/kg/hr 6 hours   >1 0 Hold  After Anti Xa less than 0.5 decrease previous rate by  4 Units/kg/hr  Every 2 hours until Anti Xa  less than 0.5 then when infusion restarts in 6 hours       Results from last 7 days   Lab Units 08/12/23  1504 08/12/23  1016 08/11/23  1822   INR   --  1.00  --    HEMOGLOBIN g/dL 12.1* 12.6* 12.4*   HEMATOCRIT % 36.9* 38.9 37.6   PLATELETS 10*3/mm3 193 205 194          Date   Time   Anti-Xa Current Rate (Unit/kg/hr) Bolus   (Units) Rate Change   (Unit/kg/hr) New Rate (Unit/kg/hr) Next   Anti-Xa Comments  Pump Check Daily   8/12 1000  0 No bolus +11.7 11.7 1600 Carola 3249   8/12 1642 0.15 11.7 2000 +2.3 14 0000 DW RN                                                                                                                                                                                                                       Christo Kelley, [facility]  8/12/2023  18:03 EDT

## 2023-08-12 NOTE — H&P
[facility] Medicine Services  HISTORY AND PHYSICAL    Patient Name: Scott Milan  : 1950  MRN: [id number]  Primary Care Physician: Tigre Cates MD  Date of admission: 2023      Subjective   Subjective     Chief Complaint:  Unstable angina sx's     HPI:  Scott Milan is a 73 y.o. male with known hx CAD and prior stents, ongoing tobacco smoker, NIDDM2, HTN, HL, COPD originallly presented to [facility] ED earlier this date 23 after being awoken from sleep in early hours by crushing chest pain with associated radiation to his left arm and dyspnea which was relieved with aspirin and nitro.  He also gives a history of approximately 2 months of increasing fatigue and JETER out of the ordinary to his previous baseline.    Initial ED evaluation with at bedtime troponin elevation consistent with NSTEMI, patient was advised for admission to the hospital and further evaluation however before he could be officially seen by the hospitalist service patient had left AMA.    Since being home during the day today, patient began experiencing midsternal chest pressure again in the mid-to-late afternoon.  Again associated with dyspnea, radiation to the left arm, this time chest pressure took longer to resolve.    Upon repeat evaluation in the ED, patient's at bedtime troponin is still elevated but is showing a downward trend at this point.  Once patient was placed on nitroglycerin drip he has had no further chest pain.          Review of Systems   Gen- No fevers, chills, HA, uncontrolled pain  CV-see above.  No new edema or palpitations  Resp- No SOA, cough  GI- No N/V/D, abd pain, constipation  Skin - No rash      Personal History     Past Medical History:   Diagnosis Date    Allergic rhinitis     seasonal    Atrial fibrillation     COPD (chronic obstructive pulmonary disease)     Coronary artery disease     History of cardiac catheterization     Hypercholesterolemia      Hyperlipidemia     Hypertension     Kidney stones     Recurrent chest pain     Tobacco abuse              Past Surgical History:   Procedure Laterality Date    CORONARY ANGIOPLASTY WITH STENT PLACEMENT         Family History: family history includes No Known Problems in his father and mother.     Social History:  reports that he has been smoking cigarettes. He has been smoking an average of 2 packs per day. He has never used smokeless tobacco. He reports that he does not drink alcohol and does not use drugs.  Social History     Social History Narrative    Not on file       Medications:  Available home medication information reviewed.  (Not in a hospital admission)      Allergies   Allergen Reactions    Effient [Prasugrel] Rash    Clopidogrel Other (See Comments)     red, hot, flushed face    Prednisone Unknown - Low Severity       Objective   Objective     Vital Signs:   Temp:  [97.8 øF (36.6 øC)-98.1 øF (36.7 øC)] 98.1 øF (36.7 øC)  Heart Rate:  [] 79  Resp:  [16-21] 21  BP: (114-154)/(60-88) 128/67  Flow (L/min):  [2-3] 3       Physical Exam   Constitutional: No acute distress, awake, alert, nontoxic, centrally overweight body habitus  Respiratory: Slight end expiratory wheezes,, good effort, nonlabored respirations on 2 L  Cardiovascular: RRR, no murmur  Gastrointestinal: Positive bowel sounds, soft, nontender, nondistended  Musculoskeletal: No peripheral edema, normal muscle tone for age  Psychiatric: Appropriate affect, good insight and judgement, cooperative  Neurologic: Oriented x 3, movements symmetric BUE and BLE, Cranial Nerves grossly intact, speech clear and fluent  Skin: No rashes, no jaundice, no petechiae, no mottling      LAB RESULTS:      Lab 08/11/23  1822 08/11/23  0235   WBC 7.93 9.06   HEMOGLOBIN 12.4* 13.3   HEMATOCRIT 37.6 40.5   PLATELETS 194 237   NEUTROS ABS 5.18 5.76   IMMATURE GRANS (ABS) 0.01 0.04   LYMPHS ABS 1.55 1.84   MONOS ABS 0.76 0.92*   EOS ABS 0.39 0.46*   MCV 92.2 89.6    D DIMER QUANT  --  0.38         Lab 08/11/23 1822 08/11/23  0235   SODIUM 141 143   POTASSIUM 3.7 3.6   CHLORIDE 106 106   CO2 23.0 24.0   ANION GAP 12.0 13.0   BUN 19 18   CREATININE 0.74* 0.90   EGFR 94.5 89.1   GLUCOSE 190* 234*   CALCIUM 8.6 9.2   HEMOGLOBIN A1C  --  6.90*   TSH  --  2.420         Lab 08/11/23 1822 08/11/23  0235   TOTAL PROTEIN 6.5 6.8   ALBUMIN 4.0 4.0   GLOBULIN 2.5 2.8   ALT (SGPT) 15 18   AST (SGOT) 15 19   BILIRUBIN 0.3 0.4   ALK PHOS 81 87   LIPASE 25 21         Lab 08/11/23 1822 08/11/23  0459 08/11/23  0235   PROBNP 205.6  --  78.9   HSTROP T 38* 50* 22*         Lab 08/11/23  0235   CHOLESTEROL 98   LDL CHOL 34   HDL CHOL 29*   TRIGLYCERIDES 222*                 Microbiology Results (last 10 days)       ** No results found for the last 240 hours. **            XR Chest 1 View    Result Date: 8/11/2023  XR CHEST 1 VW Date of Exam: 8/11/2023 6:20 PM EDT Indication: Chest Pain Triage Protocol Comparison: 8/11/2023 Findings: Heart mediastinum and pulmonary vasculature appear within normal limits. There is very mild bibasilar discoid atelectasis, a little increased from the prior study, but the lungs otherwise appear grossly clear. No edema, effusion or pneumothorax is seen.     Impression: Impression: Very mild bibasilar atelectasis. No other evidence of active chest pathology. Electronically Signed: Bernabe Ceja MD  8/11/2023 7:02 PM EDT  Workstation ID: [id number]    XR Chest 1 View    Result Date: 8/11/2023  XR CHEST 1 VW Date of Exam: 8/11/2023 2:41 AM EDT Indication: Chest Pain Triage Protocol Comparison: 11/16/2021. Findings: There is no pneumothorax, pleural effusion or focal airspace consolidation. Heart size and pulmonary vasculature appear within normal limits. Regional bones appear intact.     Impression: Impression: No acute cardiopulmonary abnormality. Electronically Signed: Abrahan Baugh MD  8/11/2023 2:59 AM EDT  Workstation ID: [id number]         Assessment & Plan    Assessment & Plan     Active Hospital Problems    Diagnosis  POA    **NSTEMI (non-ST elevated myocardial infarction) [I21.4]  Yes    Type 2 diabetes mellitus with hyperglycemia, without long-term current use of insulin [E11.65]  Yes    Atrial fibrillation [I48.91]  Yes     A. CHADS-2 score = 2  B. Symptomatic with dyspnea and palpitations  C. Currently on no anticoagulation  D. Status post BRANDON-guided cardioversion, January 2014      Coronary artery disease [I25.10]  Yes           A.  [facility], February 20, 2015: ANALI to OM, 50% occlusion of mid RCA, 60-70% occlusion of LAD.        B.  scheduled functional assessment via left heart catheterization 3 weeks after intervention, patient did not keep appointment.        C.  recurrent chest pain with left heart catheterization, May 12, 2015: Drug-eluting stent to LAD, drug-eluting stent to the RCA.  EF 50%.      Hypertension [I10]  Yes    Hypercholesterolemia [E78.00]  Yes    Tobacco abuse [Z72.0]  Yes    COPD (chronic obstructive pulmonary disease) [J44.9]  Yes       NSTEMI / Unstable Angina  CAD with hx of prior stents  HTN  HL  - PRO = 6  - s/p ASA 325mg in ED  - ALLERGY to effient and plavix -- will defer DAPT load for now  - give 1mg/kg Lovenox x1  - continue nitro gtt -- currently CP free  - continue home sotolol, increase home crestor to 40mg, add low dose lisinopril   - HS troponin now trending down  - consult cardiology (Dr. Mukherjee) in am   - NPO p 5am for possible [facility] eval     NIDDM2  - hold metformin, SSI ac&hs  - A1c = 6.9%    Ongoing tobacco abuse   COPD  - nicotine patch  - smoking cessation education  - duo nebs QID    DVT prophylaxis:  Lovenox x1 for now      CODE STATUS:    Code Status and Medical Interventions:   Ordered at: 08/11/23 2011     Code Status (Patient has no pulse and is not breathing):    CPR (Attempt to Resuscitate)     Medical Interventions (Patient has pulse or is breathing):    Full Support       Expected Discharge 8/13/23  Expected discharge  date/ time has not been documented.     Yajaira Santos MD  08/11/23

## 2023-08-12 NOTE — CONSULTS
[facility]   Consult Note    Referring Provider: Dr. Santos    Primary cardiologist: Dr. Mukherjee    Reason for Consultation: NSTEMI    Patient Care Team:  Tigre Cates MD as PCP - General (Family Medicine)  Sai Mukherjee III, MD as Cardiologist (Cardiology)    Past Medical History:   Diagnosis Date    Allergic rhinitis     seasonal    Atrial fibrillation     COPD (chronic obstructive pulmonary disease)     Coronary artery disease     History of cardiac catheterization     Hypercholesterolemia     Hyperlipidemia     Hypertension     Kidney stones     Recurrent chest pain     Tobacco abuse        Past Surgical History:   Procedure Laterality Date    CORONARY ANGIOPLASTY WITH STENT PLACEMENT           Allergies   Allergen Reactions    Effient [Prasugrel] Rash    Clopidogrel Other (See Comments)     red, hot, flushed face    Prednisone Unknown - Low Severity           Current Facility-Administered Medications:     acetaminophen (TYLENOL) tablet 650 mg, 650 mg, Oral, Q4H PRN, Yajaira Santos MD    aluminum-magnesium hydroxide-simethicone (MAALOX MAX) 400-400-40 MG/5ML suspension 15 mL, 15 mL, Oral, Q6H PRN, Yajaira Santos MD, 15 mL at 08/12/23 0152    amLODIPine (NORVASC) tablet 5 mg, 5 mg, Oral, Daily, Yajaira Santos MD, 5 mg at 08/12/23 0833    aspirin chewable tablet 324 mg, 324 mg, Oral, Once, Sanchez Crespo,     aspirin EC tablet 81 mg, 81 mg, Oral, Daily, Yajaira Santos MD, 81 mg at 08/12/23 0833    sennosides-docusate (PERICOLACE) 8.6-50 MG per tablet 2 tablet, 2 tablet, Oral, BID, 2 tablet at 08/12/23 0833 **AND** polyethylene glycol (MIRALAX) packet 17 g, 17 g, Oral, Daily PRN **AND** bisacodyl (DULCOLAX) EC tablet 5 mg, 5 mg, Oral, Daily PRN **AND** bisacodyl (DULCOLAX) suppository 10 mg, 10 mg, Rectal, Daily PRN, Yajaira Santos MD    Calcium Replacement - Follow Nurse / BPA Driven Protocol, , Does not apply, PRN, Yajaira Santos MD    dextrose (D50W) (25 g/50 mL) IV injection 25 g,  25 g, Intravenous, Q15 Min PRN, Yajaira Santos MD    dextrose (GLUTOSE) oral gel 15 g, 15 g, Oral, Q15 Min PRN, Yajaira Santos MD    Enoxaparin Sodium (LOVENOX) syringe 90 mg, 1 mg/kg, Subcutaneous, Once, Yajaira Santos MD    glucagon (GLUCAGEN) injection 1 mg, 1 mg, Intramuscular, Q15 Min PRN, Yajaira Santos MD    HYDROcodone-acetaminophen (NORCO) 7.5-325 MG per tablet 1 tablet, 1 tablet, Oral, Q6H PRN, Yajaira Santos MD, 1 tablet at 08/12/23 0542    Insulin Lispro (humaLOG) injection 2-9 Units, 2-9 Units, Subcutaneous, 4x Daily AC & at Bedtime, Yajaira Santos MD    ipratropium-albuterol (DUO-NEB) nebulizer solution 3 mL, 3 mL, Nebulization, 4x Daily - RT, Yajaira Santos MD, 3 mL at 08/12/23 0758    lisinopril (PRINIVIL,ZESTRIL) tablet 5 mg, 5 mg, Oral, Daily, Yajaira Santos MD, 5 mg at 08/12/23 0834    LORazepam (ATIVAN) tablet 2 mg, 2 mg, Oral, Q6H PRN, Yajaira Santos MD    Magnesium Standard Dose Replacement - Follow Nurse / BPA Driven Protocol, , Does not apply, PRN, Yajaira Santos MD    morphine injection 1 mg, 1 mg, Intravenous, Q4H PRN, 1 mg at 08/12/23 0159 **AND** naloxone (NARCAN) injection 0.4 mg, 0.4 mg, Intravenous, Q5 Min PRN, Yajaira Santos MD    nicotine (NICODERM CQ) 21 MG/24HR patch 1 patch, 1 patch, Transdermal, Q24H, Yajaira Santos MD, 1 patch at 08/11/23 2225    nitroglycerin (NITROSTAT) SL tablet 0.4 mg, 0.4 mg, Sublingual, Q5 Min PRN, Yajaira Santos MD    nitroglycerin (TRIDIL) 200 mcg/ml infusion, 5-200 mcg/min, Intravenous, Titrated, Yajaira Santos MD, Last Rate: 3 mL/hr at 08/12/23 0832, 10 mcg/min at 08/12/23 0832    ondansetron (ZOFRAN) injection 4 mg, 4 mg, Intravenous, Q6H PRN, Yajaira Santos MD    Phosphorus Replacement - Follow Nurse / BPA Driven Protocol, , Does not apply, PRN, Yajaira Santos MD    Potassium Replacement - Follow Nurse / BPA Driven Protocol, , Does not apply, PRN, Yajaira Santos MD    rosuvastatin (CRESTOR) tablet 40 mg, 40 mg, Oral, Nightly, Yajaira Santos MD    sodium chloride 0.9 % flush 10 mL, 10  mL, Intravenous, Q12H, Yajaira Santos MD, 10 mL at 08/11/23 2156    sodium chloride 0.9 % flush 10 mL, 10 mL, Intravenous, PRN, Yajaira Santos MD    sodium chloride 0.9 % infusion 40 mL, 40 mL, Intravenous, PRN, Yajaira Santos MD    sotalol (BETAPACE) tablet 80 mg, 80 mg, Oral, Q12H, Yajaira Santos MD, 80 mg at 08/12/23 0833    tamsulosin (FLOMAX) 24 hr capsule 0.4 mg, 0.4 mg, Oral, Daily, Yajaira Santos MD, 0.4 mg at 08/12/23 0833    nitroglycerin, 5-200 mcg/min, Last Rate: 10 mcg/min (08/12/23 0832)        Medications Prior to Admission   Medication Sig Dispense Refill Last Dose    albuterol sulfate  (90 Base) MCG/ACT inhaler As Needed.   8/11/2023    amLODIPine (NORVASC) 5 MG tablet Take 1 tablet by mouth Daily. Need lab work and f/u appt for further refills. 90 tablet 0 8/11/2023    aspirin 81 MG tablet Take 1 tablet by mouth Daily.   8/11/2023    HYDROcodone-acetaminophen (NORCO) 7.5-325 MG per tablet Take 1 tablet by mouth Every 6 (Six) Hours As Needed for Moderate Pain  or Severe Pain . 12 tablet 0 8/10/2023    metFORMIN (GLUCOPHAGE) 500 MG tablet Take 1 tablet by mouth 2 (Two) Times a Day.   8/11/2023    nitroglycerin (NITROSTAT) 0.4 MG SL tablet Place 1 tablet under the tongue Every 5 (Five) Minutes As Needed for Chest Pain. Take no more than 3 doses in 15 minutes. 100 tablet 0     ondansetron ODT (ZOFRAN-ODT) 4 MG disintegrating tablet Place 1 tablet on the tongue Every 8 (Eight) Hours As Needed for Nausea or Vomiting. 12 tablet 0     rosuvastatin (CRESTOR) 20 MG tablet Take 1 tablet by mouth Daily. 90 tablet 1 8/11/2023    sotalol (BETAPACE) 80 MG tablet TAKE ONE (1) TABLET BY MOUTH TWICE A DAY  180 tablet 0 8/11/2023    Spiriva HandiHaler 18 MCG per inhalation capsule As Needed.   8/11/2023    tamsulosin (FLOMAX) 0.4 MG capsule 24 hr capsule Daily.   8/11/2023         Subjective .   History of present illness:    Patient is a 73-year-old  male who we are asked to see today for further evaluation of  NSTEMI.  He has previous history of CAD and multiple cardiac risk factors.  Patient notes he was in his usual state of health up until the last 2 to 3 days.  Has had been having recurrent midsternal chest burning sensation with radiation into his back and shoulders.  Also has been having shortness of breath.  He is unclear if this is more related to his COPD or heart.  No reported syncope.  Discomfort has been responsive to nitroglycerin.  Due to significant episode he presented to the hospital yesterday.  Here cardiac enzymes were noted to be elevated/ruled in for NSTEMI and he was subsequently admitted.  Patient is currently on IV nitroglycerin and pain-free.      Social History     Socioeconomic History    Marital status:    Tobacco Use    Smoking status: Every Day     Packs/day: 2.00     Types: Cigarettes    Smokeless tobacco: Never   Vaping Use    Vaping Use: Never used   Substance and Sexual Activity    Alcohol use: No    Drug use: No    Sexual activity: Defer     Family History   Problem Relation Age of Onset    No Known Problems Mother     No Known Problems Father          Review of Systems:  Review of Systems   Constitutional: Positive for malaise/fatigue. Negative for fever.   HENT:  Negative for nosebleeds.    Eyes:  Negative for redness and visual disturbance.   Cardiovascular:  Positive for chest pain and dyspnea on exertion. Negative for orthopnea, palpitations and paroxysmal nocturnal dyspnea.   Respiratory:  Positive for cough, shortness of breath and wheezing. Negative for snoring and sputum production.    Hematologic/Lymphatic: Negative for bleeding problem.   Skin:  Negative for flushing, itching and rash.   Musculoskeletal:  Positive for arthritis, back pain and joint pain. Negative for falls and muscle cramps.   Gastrointestinal:  Positive for constipation. Negative for abdominal pain, diarrhea, heartburn, nausea and vomiting.   Genitourinary:  Negative for hematuria.   Neurological:   "Negative for excessive daytime sleepiness, dizziness, headaches, tremors and weakness.   Psychiatric/Behavioral:  Negative for substance abuse. The patient is not nervous/anxious.             Objective   Vitals:  /78   Pulse 79   Temp 98.3 øF (36.8 øC) (Oral)   Resp 16   Ht 180.3 cm (71\")   Wt 85.6 kg (188 lb 11.2 oz)   SpO2 93%   BMI 26.32 kg/mý    No intake or output data in the 24 hours ending 08/12/23 0848    Vitals reviewed.   Constitutional:       Appearance: Well-developed and not in distress. Chronically ill-appearing.   Neck:      Vascular: No JVD.      Trachea: No tracheal deviation.   Pulmonary:      Effort: Pulmonary effort is normal.      Breath sounds: Scattered Wheezing present.   Cardiovascular:      Normal rate. Regular rhythm.      Murmurs: There is no murmur.   Pulses:     Intact distal pulses.   Edema:     Peripheral edema absent.   Abdominal:      General: Bowel sounds are normal.      Palpations: Abdomen is soft.      Tenderness: There is no abdominal tenderness.   Musculoskeletal:         General: No deformity. Skin:     General: Skin is warm and dry.   Neurological:      Mental Status: Alert and oriented to person, place, and time.     I have examined the patient and agree with the above         Results Review:  I reviewed the patient's new clinical results.  Results from last 7 days   Lab Units 08/11/23  1822   WBC 10*3/mm3 7.93   HEMOGLOBIN g/dL 12.4*   HEMATOCRIT % 37.6   PLATELETS 10*3/mm3 194     Results from last 7 days   Lab Units 08/11/23  1822   SODIUM mmol/L 141   POTASSIUM mmol/L 3.7   CHLORIDE mmol/L 106   CO2 mmol/L 23.0   BUN mg/dL 19   CREATININE mg/dL 0.74*   CALCIUM mg/dL 8.6   BILIRUBIN mg/dL 0.3   ALK PHOS U/L 81   ALT (SGPT) U/L 15   AST (SGOT) U/L 15   GLUCOSE mg/dL 190*     Results from last 7 days   Lab Units 08/11/23  1822   SODIUM mmol/L 141   POTASSIUM mmol/L 3.7   CHLORIDE mmol/L 106   CO2 mmol/L 23.0   BUN mg/dL 19   CREATININE mg/dL 0.74*   GLUCOSE " mg/dL 190*   CALCIUM mg/dL 8.6         Lab Results   Lab Value Date/Time    TROPONINT 38 (H) 08/11/2023 1822    TROPONINT 50 (H) 08/11/2023 0459    TROPONINT 22 (H) 08/11/2023 0235    TROPONINT <0.010 11/16/2021 1600    TROPONINT <0.010 11/16/2021 1404     Results from last 7 days   Lab Units 08/11/23  0235   TSH uIU/mL 2.420     Results from last 7 days   Lab Units 08/11/23  0235   CHOLESTEROL mg/dL 98   TRIGLYCERIDES mg/dL 222*   HDL CHOL mg/dL 29*   LDL CHOL mg/dL 34     Results from last 7 days   Lab Units 08/11/23  1822   PROBNP pg/mL 205.6           Tele:  SR    EKG: SR no acute changes.       Assessment & Plan     NSTEMI  CAD with previous stents.  Ongoing tobacco abuse  Dyslipidemia  Hypertension  PAF, previously intolerant to anticoagulation.  On sotalol.      Plan:    Patient currently stable on nitroglycerin.  We will add heparin drip.  We will plan for cardiac catheterization plus or minus catheter-based intervention on Monday.  This was discussed with patient and family.  Continue current CV medications.  Check echocardiogram today.  We will continue to follow along.    ZELALEM Warren obtained past medical, family history, social history, review of systems and functioned as a scribe for the remainder of the dictation for Dr. Rico.     I have seen and examined the patient, I have reviewed the note, discussed the case with the advance practice clinician, made necessary changes and I agree with the final note.    Raul Rico MD  08/12/23  09:43 EDT        Dictated utilizing Dragon dictation

## 2023-08-12 NOTE — NURSING NOTE
Provider called STAT about 7/10 burning chest pain. PT is seeing yellow spots, slurred speech, unable to verbalize location. HR 81 /85 93% O2. MD came to room. EKG done, troponin drawn, previously mentation was slowed and now back to baseline.

## 2023-08-13 LAB
ANION GAP SERPL CALCULATED.3IONS-SCNC: 10 MMOL/L (ref 5–15)
BASOPHILS # BLD AUTO: 0.04 10*3/MM3 (ref 0–0.2)
BASOPHILS NFR BLD AUTO: 0.5 % (ref 0–1.5)
BUN SERPL-MCNC: 19 MG/DL (ref 8–23)
BUN/CREAT SERPL: 22.4 (ref 7–25)
CALCIUM SPEC-SCNC: 8.2 MG/DL (ref 8.6–10.5)
CHLORIDE SERPL-SCNC: 107 MMOL/L (ref 98–107)
CO2 SERPL-SCNC: 22 MMOL/L (ref 22–29)
CREAT SERPL-MCNC: 0.85 MG/DL (ref 0.76–1.27)
DEPRECATED RDW RBC AUTO: 41.3 FL (ref 37–54)
EGFRCR SERPLBLD CKD-EPI 2021: 90.6 ML/MIN/1.73
EOSINOPHIL # BLD AUTO: 0.43 10*3/MM3 (ref 0–0.4)
EOSINOPHIL NFR BLD AUTO: 5.1 % (ref 0.3–6.2)
ERYTHROCYTE [DISTWIDTH] IN BLOOD BY AUTOMATED COUNT: 12.5 % (ref 12.3–15.4)
GLUCOSE BLDC GLUCOMTR-MCNC: 123 MG/DL (ref 70–130)
GLUCOSE BLDC GLUCOMTR-MCNC: 130 MG/DL (ref 70–130)
GLUCOSE BLDC GLUCOMTR-MCNC: 137 MG/DL (ref 70–130)
GLUCOSE BLDC GLUCOMTR-MCNC: 169 MG/DL (ref 70–130)
GLUCOSE SERPL-MCNC: 97 MG/DL (ref 65–99)
HCT VFR BLD AUTO: 32.9 % (ref 37.5–51)
HGB BLD-MCNC: 10.8 G/DL (ref 13–17.7)
IMM GRANULOCYTES # BLD AUTO: 0.03 10*3/MM3 (ref 0–0.05)
IMM GRANULOCYTES NFR BLD AUTO: 0.4 % (ref 0–0.5)
LYMPHOCYTES # BLD AUTO: 2.25 10*3/MM3 (ref 0.7–3.1)
LYMPHOCYTES NFR BLD AUTO: 26.7 % (ref 19.6–45.3)
MCH RBC QN AUTO: 29.8 PG (ref 26.6–33)
MCHC RBC AUTO-ENTMCNC: 32.8 G/DL (ref 31.5–35.7)
MCV RBC AUTO: 90.6 FL (ref 79–97)
MONOCYTES # BLD AUTO: 0.85 10*3/MM3 (ref 0.1–0.9)
MONOCYTES NFR BLD AUTO: 10.1 % (ref 5–12)
NEUTROPHILS NFR BLD AUTO: 4.82 10*3/MM3 (ref 1.7–7)
NEUTROPHILS NFR BLD AUTO: 57.2 % (ref 42.7–76)
NRBC BLD AUTO-RTO: 0 /100 WBC (ref 0–0.2)
PLATELET # BLD AUTO: 175 10*3/MM3 (ref 140–450)
PMV BLD AUTO: 10.5 FL (ref 6–12)
POTASSIUM SERPL-SCNC: 4.1 MMOL/L (ref 3.5–5.2)
RBC # BLD AUTO: 3.63 10*6/MM3 (ref 4.14–5.8)
SODIUM SERPL-SCNC: 139 MMOL/L (ref 136–145)
UFH PPP CHRO-ACNC: 0.23 IU/ML (ref 0.3–0.7)
UFH PPP CHRO-ACNC: 0.54 IU/ML (ref 0.3–0.7)
UFH PPP CHRO-ACNC: 0.91 IU/ML (ref 0.3–0.7)
WBC NRBC COR # BLD: 8.42 10*3/MM3 (ref 3.4–10.8)

## 2023-08-13 PROCEDURE — 85520 HEPARIN ASSAY: CPT

## 2023-08-13 PROCEDURE — 80048 BASIC METABOLIC PNL TOTAL CA: CPT | Performed by: STUDENT IN AN ORGANIZED HEALTH CARE EDUCATION/TRAINING PROGRAM

## 2023-08-13 PROCEDURE — 25010000002 MORPHINE PER 10 MG: Performed by: FAMILY MEDICINE

## 2023-08-13 PROCEDURE — 94799 UNLISTED PULMONARY SVC/PX: CPT

## 2023-08-13 PROCEDURE — 25010000002 HEPARIN (PORCINE) 25000-0.45 UT/250ML-% SOLUTION

## 2023-08-13 PROCEDURE — 99232 SBSQ HOSP IP/OBS MODERATE 35: CPT | Performed by: PEDIATRICS

## 2023-08-13 PROCEDURE — 94761 N-INVAS EAR/PLS OXIMETRY MLT: CPT

## 2023-08-13 PROCEDURE — 94664 DEMO&/EVAL PT USE INHALER: CPT

## 2023-08-13 PROCEDURE — 82948 REAGENT STRIP/BLOOD GLUCOSE: CPT

## 2023-08-13 PROCEDURE — 85025 COMPLETE CBC W/AUTO DIFF WBC: CPT | Performed by: NURSE PRACTITIONER

## 2023-08-13 PROCEDURE — 99232 SBSQ HOSP IP/OBS MODERATE 35: CPT | Performed by: NURSE PRACTITIONER

## 2023-08-13 RX ORDER — HEPARIN SODIUM 10000 [USP'U]/100ML
9.5 INJECTION, SOLUTION INTRAVENOUS
Status: DISCONTINUED | OUTPATIENT
Start: 2023-08-13 | End: 2023-08-14

## 2023-08-13 RX ORDER — SUCRALFATE 1 G/1
1 TABLET ORAL
Status: DISCONTINUED | OUTPATIENT
Start: 2023-08-13 | End: 2023-08-15 | Stop reason: HOSPADM

## 2023-08-13 RX ORDER — PANTOPRAZOLE SODIUM 40 MG/1
40 TABLET, DELAYED RELEASE ORAL
Status: DISCONTINUED | OUTPATIENT
Start: 2023-08-14 | End: 2023-08-15 | Stop reason: HOSPADM

## 2023-08-13 RX ADMIN — IPRATROPIUM BROMIDE AND ALBUTEROL SULFATE 3 ML: 2.5; .5 SOLUTION RESPIRATORY (INHALATION) at 08:55

## 2023-08-13 RX ADMIN — IPRATROPIUM BROMIDE AND ALBUTEROL SULFATE 3 ML: 2.5; .5 SOLUTION RESPIRATORY (INHALATION) at 21:10

## 2023-08-13 RX ADMIN — IPRATROPIUM BROMIDE AND ALBUTEROL SULFATE 3 ML: 2.5; .5 SOLUTION RESPIRATORY (INHALATION) at 16:08

## 2023-08-13 RX ADMIN — SENNOSIDES AND DOCUSATE SODIUM 2 TABLET: 50; 8.6 TABLET ORAL at 08:12

## 2023-08-13 RX ADMIN — LISINOPRIL 5 MG: 5 TABLET ORAL at 08:12

## 2023-08-13 RX ADMIN — HYDROCODONE BITARTRATE AND ACETAMINOPHEN 1 TABLET: 7.5; 325 TABLET ORAL at 20:48

## 2023-08-13 RX ADMIN — HYDROCODONE BITARTRATE AND ACETAMINOPHEN 1 TABLET: 7.5; 325 TABLET ORAL at 13:35

## 2023-08-13 RX ADMIN — MORPHINE SULFATE 1 MG: 2 INJECTION, SOLUTION INTRAMUSCULAR; INTRAVENOUS at 05:10

## 2023-08-13 RX ADMIN — SOTALOL HYDROCHLORIDE 80 MG: 80 TABLET ORAL at 08:12

## 2023-08-13 RX ADMIN — HEPARIN SODIUM 15 UNITS/KG/HR: 10000 INJECTION, SOLUTION INTRAVENOUS at 09:14

## 2023-08-13 RX ADMIN — SUCRALFATE 1 G: 1 TABLET ORAL at 17:25

## 2023-08-13 RX ADMIN — SUCRALFATE 1 G: 1 TABLET ORAL at 11:22

## 2023-08-13 RX ADMIN — Medication 10 ML: at 20:49

## 2023-08-13 RX ADMIN — TAMSULOSIN HYDROCHLORIDE 0.4 MG: 0.4 CAPSULE ORAL at 08:12

## 2023-08-13 RX ADMIN — ASPIRIN 81 MG: 81 TABLET, COATED ORAL at 08:12

## 2023-08-13 RX ADMIN — SOTALOL HYDROCHLORIDE 80 MG: 80 TABLET ORAL at 20:48

## 2023-08-13 RX ADMIN — AMLODIPINE BESYLATE 5 MG: 5 TABLET ORAL at 08:12

## 2023-08-13 RX ADMIN — HYDROCODONE BITARTRATE AND ACETAMINOPHEN 1 TABLET: 7.5; 325 TABLET ORAL at 06:25

## 2023-08-13 RX ADMIN — Medication 1 PATCH: at 22:01

## 2023-08-13 RX ADMIN — PANTOPRAZOLE SODIUM 40 MG: 40 INJECTION, POWDER, LYOPHILIZED, FOR SOLUTION INTRAVENOUS at 05:10

## 2023-08-13 RX ADMIN — ROSUVASTATIN 40 MG: 20 TABLET, FILM COATED ORAL at 08:12

## 2023-08-13 RX ADMIN — Medication 1 PATCH: at 00:26

## 2023-08-13 RX ADMIN — HYDROCODONE BITARTRATE AND ACETAMINOPHEN 1 TABLET: 7.5; 325 TABLET ORAL at 00:26

## 2023-08-13 RX ADMIN — IPRATROPIUM BROMIDE AND ALBUTEROL SULFATE 3 ML: 2.5; .5 SOLUTION RESPIRATORY (INHALATION) at 12:48

## 2023-08-13 NOTE — PROGRESS NOTES
HEPARIN INFUSION  Scott Milan is a  73 y.o. male receiving heparin infusion.     Therapy for (VTE/Cardiac):   cardiac  Patient Weight: 85.6 kg  Initial Bolus (Y/N):   no  Any Bolus (Y/N):   yes        Signs or Symptoms of Bleeding: no    Cardiac or Other (Not VTE)   Initial Bolus: 60 units/kg (Max 4,000 units)  Initial rate: 12 units/kg/hr (Max 1,000 units/hr)   Anti Xa Rebolus Infusion Hold time Change infusion Dose (Units/kg/hr) Next Anti Xa or aPTT Level Due   < 0.11 50 Units/kg  (4000 Units Max) None Increase by  3 Units/kg/hr 6 hours   0.11- 0.19 25 Units/kg  (2000 Units Max) None Increase by  2 Units/kg/hr 6 hours   0.2 - 0.29 0 None Increase by  1 Units/kg/hr 6 hours   0.3 - 0.5 0 None No Change 6 hours (after 2 consecutive levels in range check qAM)   0.51 - 0.6 0 None Decrease by  1 Units/kg/hr 6 hours   0.61 - 0.8 0 30 Minutes Decrease by  2 Units/kg/hr 6 hours   0.81 - 1 0 60 Minutes Decrease by  3 Units/kg/hr 6 hours   >1 0 Hold  After Anti Xa less than 0.5 decrease previous rate by  4 Units/kg/hr  Every 2 hours until Anti Xa  less than 0.5 then when infusion restarts in 6 hours     Results from last 7 days   Lab Units 08/13/23  0429 08/12/23  1504 08/12/23  1016   INR   --   --  1.00   HEMOGLOBIN g/dL 10.8* 12.1* 12.6*   HEMATOCRIT % 32.9* 36.9* 38.9   PLATELETS 10*3/mm3 175 193 205        Date   Time   Anti-Xa Current Rate (Unit/kg/hr) Bolus   (Units) Rate Change   (Unit/kg/hr) New Rate (Unit/kg/hr) Next   Anti-Xa Comments  Pump Check Daily   8/12 1000  0 No bolus +11.7 11.7 1600 Carola 3249   8/12 1642 0.15 11.7 2000 +2.3 14 0000 DW RN    8/13 0429 0.23 14 -- +1 15 1200 Nita 3184 -acb   8/13 1207 0.54 15 -- -1 14 1900 Maranda 1284                                                                                                                                                                                              Sal Barnhart, [facility]  8/13/2023  13:07 EDT

## 2023-08-13 NOTE — PLAN OF CARE
Goal Outcome Evaluation:  Plan of Care Reviewed With: patient        Progress: improving  Outcome Evaluation: Pt. VSS, some low BP addressed with Nitro adjustment. No SOA on 2L. Continued monitoring chest pain, adressed with IV Nitro drip. Heparin rate adjusted to 15u/kg/hr. Continue monitoring.

## 2023-08-13 NOTE — PROGRESS NOTES
[facility] Medicine Services  PROGRESS NOTE    Patient Name: Soctt Milan  : 1950  MRN: [id number]    Date of Admission: 2023  Primary Care Physician: Tigre Cates MD    Subjective   Subjective     CC:  CP    HPI:  Sitting up eating breakfast, c/o L arm pain    ROS:  Gen- No fevers, chills  CV- No chest pain, palpitations  Resp- No cough, dyspnea  GI- No N/V/D, abd pain       Objective   Objective     Vital Signs:   Temp:  [97.6 øF (36.4 øC)-97.8 øF (36.6 øC)] 97.6 øF (36.4 øC)  Heart Rate:  [64-91] 77  Resp:  [18-22] 18  BP: ()/() 117/71  Flow (L/min):  [2] 2     Physical Exam:  Constitutional: No acute distress, awake, alert  HENT: NCAT, mucous membranes moist  Respiratory: Clear to auscultation bilaterally, respiratory effort normal   Cardiovascular: RRR, no murmurs, rubs, or gallops  Gastrointestinal: Positive bowel sounds, soft, nontender, nondistended  Musculoskeletal: No bilateral ankle edema  Psychiatric: Appropriate affect, cooperative  Neurologic: Oriented x 3, no focal neuro deficits  Skin: No rashes      Results Reviewed:  LAB RESULTS:      Lab 23  0429 23  1642 23  1504 23  1016 23  1822 23  0235   WBC 8.42  --  9.98 8.88 7.93 9.06   HEMOGLOBIN 10.8*  --  12.1* 12.6* 12.4* 13.3   HEMATOCRIT 32.9*  --  36.9* 38.9 37.6 40.5   PLATELETS 175  --  193 205 194 237   NEUTROS ABS 4.82  --   --  5.70 5.18 5.76   IMMATURE GRANS (ABS) 0.03  --   --  0.03 0.01 0.04   LYMPHS ABS 2.25  --   --  1.87 1.55 1.84   MONOS ABS 0.85  --   --  0.81 0.76 0.92*   EOS ABS 0.43*  --   --  0.43* 0.39 0.46*   MCV 90.6  --  91.1 91.1 92.2 89.6   PROTIME  --   --   --  13.3  --   --    APTT  --   --   --  30.1*  --   --    HEPARIN ANTI-XA 0.23* 0.15*  --  0.10*  --   --    D DIMER QUANT  --   --   --   --   --  0.38         Lab 23  0429 23  1822 23  0235   SODIUM 139 141 143   POTASSIUM 4.1 3.7 3.6   CHLORIDE 107 106  106   CO2 22.0 23.0 24.0   ANION GAP 10.0 12.0 13.0   BUN 19 19 18   CREATININE 0.85 0.74* 0.90   EGFR 90.6 94.5 89.1   GLUCOSE 97 190* 234*   CALCIUM 8.2* 8.6 9.2   HEMOGLOBIN A1C  --   --  6.90*   TSH  --   --  2.420         Lab 08/11/23  1822 08/11/23  0235   TOTAL PROTEIN 6.5 6.8   ALBUMIN 4.0 4.0   GLOBULIN 2.5 2.8   ALT (SGPT) 15 18   AST (SGOT) 15 19   BILIRUBIN 0.3 0.4   ALK PHOS 81 87   LIPASE 25 21         Lab 08/12/23  1642 08/12/23  1500 08/12/23  1016 08/11/23  1822 08/11/23  0459 08/11/23  0235   PROBNP  --   --   --  205.6  --  78.9   HSTROP T 41* 36*  --  38* 50* 22*   PROTIME  --   --  13.3  --   --   --    INR  --   --  1.00  --   --   --          Lab 08/11/23  0235   CHOLESTEROL 98   LDL CHOL 34   HDL CHOL 29*   TRIGLYCERIDES 222*             Brief Urine Lab Results       None            Microbiology Results Abnormal       None            Adult Transthoracic Echo Complete W/ Cont if Necessary Per Protocol    Result Date: 8/12/2023    Estimated left ventricular EF = 55%   Mild mitral valve regurgitation is present.     XR Chest 1 View    Result Date: 8/11/2023  XR CHEST 1 VW Date of Exam: 8/11/2023 6:20 PM EDT Indication: Chest Pain Triage Protocol Comparison: 8/11/2023 Findings: Heart mediastinum and pulmonary vasculature appear within normal limits. There is very mild bibasilar discoid atelectasis, a little increased from the prior study, but the lungs otherwise appear grossly clear. No edema, effusion or pneumothorax is seen.     Impression: Impression: Very mild bibasilar atelectasis. No other evidence of active chest pathology. Electronically Signed: Bernabe Ceja MD  8/11/2023 7:02 PM EDT  Workstation ID: [id number]     Results for orders placed during the hospital encounter of 08/11/23    Adult Transthoracic Echo Complete W/ Cont if Necessary Per Protocol    Interpretation Summary    Estimated left ventricular EF = 55%    Mild mitral valve regurgitation is present.      Current  medications:  Scheduled Meds:amLODIPine, 5 mg, Oral, Daily  aspirin, 324 mg, Oral, Once  aspirin, 81 mg, Oral, Daily  insulin lispro, 2-9 Units, Subcutaneous, 4x Daily AC & at Bedtime  ipratropium-albuterol, 3 mL, Nebulization, 4x Daily - RT  lisinopril, 5 mg, Oral, Daily  nicotine, 1 patch, Transdermal, Q24H  pantoprazole, 40 mg, Intravenous, Q AM  pharmacy consult - [place], , Does not apply, Daily  rosuvastatin, 40 mg, Oral, Nightly  senna-docusate sodium, 2 tablet, Oral, BID  sodium chloride, 10 mL, Intravenous, Q12H  sotalol, 80 mg, Oral, Q12H  tamsulosin, 0.4 mg, Oral, Daily      Continuous Infusions:heparin, 15 Units/kg/hr, Last Rate: 15 Units/kg/hr (08/13/23 0621)  nitroglycerin, 5-200 mcg/min, Last Rate: 40 mcg/min (08/12/23 1209)  Pharmacy to Dose Heparin,       PRN Meds:.  acetaminophen    aluminum-magnesium hydroxide-simethicone    senna-docusate sodium **AND** polyethylene glycol **AND** bisacodyl **AND** bisacodyl    Calcium Replacement - Follow Nurse / BPA Driven Protocol    dextrose    dextrose    glucagon (human recombinant)    HYDROcodone-acetaminophen    LORazepam    Magnesium Standard Dose Replacement - Follow Nurse / BPA Driven Protocol    Morphine **AND** naloxone    nitroglycerin    ondansetron    Pharmacy to Dose Heparin    Phosphorus Replacement - Follow Nurse / BPA Driven Protocol    Potassium Replacement - Follow Nurse / BPA Driven Protocol    sodium chloride    sodium chloride    Assessment & Plan   Assessment & Plan     Active Hospital Problems    Diagnosis  POA    **NSTEMI (non-ST elevated myocardial infarction) [I21.4]  Yes    Type 2 diabetes mellitus with hyperglycemia, without long-term current use of insulin [E11.65]  Yes    Atrial fibrillation [I48.91]  Yes    Coronary artery disease [I25.10]  Yes    Hypertension [I10]  Yes    Hypercholesterolemia [E78.00]  Yes    Tobacco abuse [Z72.0]  Yes    COPD (chronic obstructive pulmonary disease) [J44.9]  Yes      Resolved Hospital Problems    No resolved problems to display.        Brief Hospital Course to date:  Scott Milan is a 73 y.o. male with a history of CAD and prior stents, allergies to clopidogrel and Effient, hypertension, hyperlipidemia, COPD, tobacco abuse who is presenting with unstable angina with elevated troponins, admitted for NSTEMI.  Cardiology consulted plan for cath Tameka>    NSTEMI / Unstable Angina  CAD with hx of prior stents  HTN  HL  - s/p ASA 325mg in ED  - ALLERGY to effient and plavix -- deferred DAPT load for now  - continue nitro gtt --Pt with some arm pain worse after eating, not improved after GI cocktail last night.  Will continue to monitor.    - continue sotalol, crestor, lisinopril  --echo pending  - HS troponin now trending down  - cardiology to perform cardiac cath on Mon     NIDDM2  - hold metformin, SSI ac&hs  - A1c = 6.9%     Ongoing tobacco abuse   COPD  - nicotine patch  - smoking cessation education. Left AMA overnight and came back bc he was craving a cigarette. Long discussion abt smoking cessation today  - duo nebs QID    Anemia:-unspecified  Drop in H/H--no blood in stool or other evident blood loss.  -Repeat in a.m.    Expected Discharge Location and Transportation: home  Expected Discharge   Expected Discharge Date: 8/15/2023; Expected Discharge Time:      DVT prophylaxis:  Medical DVT prophylaxis orders are present.          CODE STATUS:   Code Status and Medical Interventions:   Ordered at: 08/11/23 2011     Code Status (Patient has no pulse and is not breathing):    CPR (Attempt to Resuscitate)     Medical Interventions (Patient has pulse or is breathing):    Full Support       Radha Mooney MD  08/13/23

## 2023-08-13 NOTE — PROGRESS NOTES
"  [facility]   Inpatient Progress Note       LOS: 2 days   Patient Care Team:  Tigre Cates MD as PCP - General (Family Medicine)  Sai Mukherjee III, MD as Cardiologist (Cardiology)    Chief Complaint:  follow-up for NSTEMI and chest pain    Subjective     Interval History:   Patient sitting on side of bed.  Had significant episode of discomfort after eating yesterday.  Some mild heaviness this morning.  Cardiac enzymes remained flat throughout episode.      Review of Systems:   Pertinent positives noted in history, exam, and assessment. Otherwise reviewed and negative.      Objective     Vitals:  Blood pressure 117/71, pulse 77, temperature 97.6 øF (36.4 øC), temperature source Oral, resp. rate 18, height 180.3 cm (70.98\"), weight 85.6 kg (188 lb 11.4 oz), SpO2 93 %.     Intake/Output Summary (Last 24 hours) at 8/13/2023 0834  Last data filed at 8/13/2023 0520  Gross per 24 hour   Intake 840 ml   Output 650 ml   Net 190 ml     Vitals reviewed.   Constitutional:       Appearance: Well-developed and not in distress.   Neck:      Vascular: No JVD.      Trachea: No tracheal deviation.   Pulmonary:      Effort: Pulmonary effort is normal.      Breath sounds: Normal breath sounds.   Cardiovascular:      Normal rate. Regular rhythm.      Murmurs: There is no murmur.   Edema:     Peripheral edema absent.   Abdominal:      General: Bowel sounds are normal.      Tenderness: There is no abdominal tenderness.   Musculoskeletal:         General: No deformity. Skin:     General: Skin is warm and dry.   Neurological:      Mental Status: Alert and oriented to person, place, and time.          Results Review:     I reviewed the patient's new clinical results.    Results from last 7 days   Lab Units 08/13/23  0429   WBC 10*3/mm3 8.42   HEMOGLOBIN g/dL 10.8*   HEMATOCRIT % 32.9*   PLATELETS 10*3/mm3 175     Results from last 7 days   Lab Units 08/13/23  0429 08/11/23  1822   SODIUM mmol/L 139 141 "   POTASSIUM mmol/L 4.1 3.7   CHLORIDE mmol/L 107 106   CO2 mmol/L 22.0 23.0   BUN mg/dL 19 19   CREATININE mg/dL 0.85 0.74*   CALCIUM mg/dL 8.2* 8.6   BILIRUBIN mg/dL  --  0.3   ALK PHOS U/L  --  81   ALT (SGPT) U/L  --  15   AST (SGOT) U/L  --  15   GLUCOSE mg/dL 97 190*     Results from last 7 days   Lab Units 08/13/23  0429   SODIUM mmol/L 139   POTASSIUM mmol/L 4.1   CHLORIDE mmol/L 107   CO2 mmol/L 22.0   BUN mg/dL 19   CREATININE mg/dL 0.85   GLUCOSE mg/dL 97   CALCIUM mg/dL 8.2*     Results from last 7 days   Lab Units 08/12/23  1016   INR  1.00     Lab Results   Lab Value Date/Time    TROPONINT 41 (H) 08/12/2023 1642    TROPONINT 36 (H) 08/12/2023 1500    TROPONINT 38 (H) 08/11/2023 1822    TROPONINT 50 (H) 08/11/2023 0459    TROPONINT 22 (H) 08/11/2023 0235    TROPONINT <0.010 11/16/2021 1600    TROPONINT <0.010 11/16/2021 1404     Results from last 7 days   Lab Units 08/11/23  0235   TSH uIU/mL 2.420     Results from last 7 days   Lab Units 08/11/23  0235   CHOLESTEROL mg/dL 98   TRIGLYCERIDES mg/dL 222*   HDL CHOL mg/dL 29*   LDL CHOL mg/dL 34     Results from last 7 days   Lab Units 08/11/23  1822   PROBNP pg/mL 205.6     Results from last 7 days   Lab Units 08/12/23  1642 08/12/23  1500 08/11/23  1822   HSTROP T ng/L 41* 36* 38*     Echo:    Estimated left ventricular EF = 55%    Mild mitral valve regurgitation is present.    Tele: Sinus rhythm    Assessment:      NSTEMI (non-ST elevated myocardial infarction)    Atrial fibrillation    Coronary artery disease    Hypertension    Hypercholesterolemia    Tobacco abuse    COPD (chronic obstructive pulmonary disease)    Type 2 diabetes mellitus with hyperglycemia, without long-term current use of insulin      NSTEMI  EF normal by echo.  CAD with previous stents.  Ongoing tobacco abuse  Dyslipidemia  Hypertension  PAF, previously intolerant to anticoagulation.  On sotalol.    Plan:  Add scheduled Carafate for possibility of esophageal discomfort giving some  symptoms after eating.  Continue IV nitroglycerin and heparin.  N.p.o. after midnight with plans for cardiac catheterization plus or minus catheter-based intervention tomorrow.  This was discussed with the patient.  He verbalizes understanding and wishes to proceed.      ZELALEM Warren   Dictated utilizing Dragon dictation

## 2023-08-14 LAB
ANION GAP SERPL CALCULATED.3IONS-SCNC: 11 MMOL/L (ref 5–15)
BASOPHILS # BLD AUTO: 0.02 10*3/MM3 (ref 0–0.2)
BASOPHILS NFR BLD AUTO: 0.3 % (ref 0–1.5)
BUN SERPL-MCNC: 15 MG/DL (ref 8–23)
BUN/CREAT SERPL: 19.7 (ref 7–25)
CALCIUM SPEC-SCNC: 8.2 MG/DL (ref 8.6–10.5)
CHLORIDE SERPL-SCNC: 106 MMOL/L (ref 98–107)
CO2 SERPL-SCNC: 22 MMOL/L (ref 22–29)
CREAT SERPL-MCNC: 0.76 MG/DL (ref 0.76–1.27)
DEPRECATED RDW RBC AUTO: 40.6 FL (ref 37–54)
EGFRCR SERPLBLD CKD-EPI 2021: 93.7 ML/MIN/1.73
EOSINOPHIL # BLD AUTO: 0.3 10*3/MM3 (ref 0–0.4)
EOSINOPHIL NFR BLD AUTO: 4.3 % (ref 0.3–6.2)
ERYTHROCYTE [DISTWIDTH] IN BLOOD BY AUTOMATED COUNT: 12.5 % (ref 12.3–15.4)
GLUCOSE BLDC GLUCOMTR-MCNC: 113 MG/DL (ref 70–130)
GLUCOSE BLDC GLUCOMTR-MCNC: 115 MG/DL (ref 70–130)
GLUCOSE BLDC GLUCOMTR-MCNC: 130 MG/DL (ref 70–130)
GLUCOSE BLDC GLUCOMTR-MCNC: 158 MG/DL (ref 70–130)
GLUCOSE SERPL-MCNC: 99 MG/DL (ref 65–99)
HCT VFR BLD AUTO: 33.8 % (ref 37.5–51)
HGB BLD-MCNC: 11.3 G/DL (ref 13–17.7)
IMM GRANULOCYTES # BLD AUTO: 0.03 10*3/MM3 (ref 0–0.05)
IMM GRANULOCYTES NFR BLD AUTO: 0.4 % (ref 0–0.5)
LYMPHOCYTES # BLD AUTO: 1.93 10*3/MM3 (ref 0.7–3.1)
LYMPHOCYTES NFR BLD AUTO: 27.5 % (ref 19.6–45.3)
MAGNESIUM SERPL-MCNC: 2.1 MG/DL (ref 1.6–2.4)
MCH RBC QN AUTO: 29.8 PG (ref 26.6–33)
MCHC RBC AUTO-ENTMCNC: 33.4 G/DL (ref 31.5–35.7)
MCV RBC AUTO: 89.2 FL (ref 79–97)
MONOCYTES # BLD AUTO: 0.75 10*3/MM3 (ref 0.1–0.9)
MONOCYTES NFR BLD AUTO: 10.7 % (ref 5–12)
NEUTROPHILS NFR BLD AUTO: 3.99 10*3/MM3 (ref 1.7–7)
NEUTROPHILS NFR BLD AUTO: 56.8 % (ref 42.7–76)
NRBC BLD AUTO-RTO: 0 /100 WBC (ref 0–0.2)
PHOSPHATE SERPL-MCNC: 2.7 MG/DL (ref 2.5–4.5)
PLATELET # BLD AUTO: 174 10*3/MM3 (ref 140–450)
PMV BLD AUTO: 11.3 FL (ref 6–12)
POTASSIUM SERPL-SCNC: 4.2 MMOL/L (ref 3.5–5.2)
QT INTERVAL: 390 MS
QT INTERVAL: 404 MS
QTC INTERVAL: 463 MS
QTC INTERVAL: 471 MS
RBC # BLD AUTO: 3.79 10*6/MM3 (ref 4.14–5.8)
SODIUM SERPL-SCNC: 139 MMOL/L (ref 136–145)
UFH PPP CHRO-ACNC: 0.69 IU/ML (ref 0.3–0.7)
WBC NRBC COR # BLD: 7.02 10*3/MM3 (ref 3.4–10.8)

## 2023-08-14 PROCEDURE — 93454 CORONARY ARTERY ANGIO S&I: CPT | Performed by: INTERNAL MEDICINE

## 2023-08-14 PROCEDURE — 25010000002 FENTANYL CITRATE (PF) 50 MCG/ML SOLUTION: Performed by: INTERNAL MEDICINE

## 2023-08-14 PROCEDURE — 94799 UNLISTED PULMONARY SVC/PX: CPT

## 2023-08-14 PROCEDURE — 80048 BASIC METABOLIC PNL TOTAL CA: CPT | Performed by: PEDIATRICS

## 2023-08-14 PROCEDURE — 25010000002 NITROGLYCERIN 100-5 MCG/ML-% SOLUTION: Performed by: INTERNAL MEDICINE

## 2023-08-14 PROCEDURE — 25010000002 HEPARIN (PORCINE) PER 1000 UNITS: Performed by: INTERNAL MEDICINE

## 2023-08-14 PROCEDURE — 82948 REAGENT STRIP/BLOOD GLUCOSE: CPT

## 2023-08-14 PROCEDURE — 25510000001 IOPAMIDOL PER 1 ML: Performed by: INTERNAL MEDICINE

## 2023-08-14 PROCEDURE — 83735 ASSAY OF MAGNESIUM: CPT | Performed by: PEDIATRICS

## 2023-08-14 PROCEDURE — 85520 HEPARIN ASSAY: CPT

## 2023-08-14 PROCEDURE — B2131ZZ FLUOROSCOPY OF MULTIPLE CORONARY ARTERY BYPASS GRAFTS USING LOW OSMOLAR CONTRAST: ICD-10-PCS | Performed by: INTERNAL MEDICINE

## 2023-08-14 PROCEDURE — 4A023N7 MEASUREMENT OF CARDIAC SAMPLING AND PRESSURE, LEFT HEART, PERCUTANEOUS APPROACH: ICD-10-PCS | Performed by: INTERNAL MEDICINE

## 2023-08-14 PROCEDURE — C1769 GUIDE WIRE: HCPCS | Performed by: INTERNAL MEDICINE

## 2023-08-14 PROCEDURE — 94664 DEMO&/EVAL PT USE INHALER: CPT

## 2023-08-14 PROCEDURE — C1894 INTRO/SHEATH, NON-LASER: HCPCS | Performed by: INTERNAL MEDICINE

## 2023-08-14 PROCEDURE — 85025 COMPLETE CBC W/AUTO DIFF WBC: CPT | Performed by: PEDIATRICS

## 2023-08-14 PROCEDURE — 94761 N-INVAS EAR/PLS OXIMETRY MLT: CPT

## 2023-08-14 PROCEDURE — 25010000002 MIDAZOLAM PER 1 MG: Performed by: INTERNAL MEDICINE

## 2023-08-14 PROCEDURE — 25010000002 HEPARIN (PORCINE) 25000-0.45 UT/250ML-% SOLUTION: Performed by: INTERNAL MEDICINE

## 2023-08-14 PROCEDURE — 84100 ASSAY OF PHOSPHORUS: CPT | Performed by: PEDIATRICS

## 2023-08-14 PROCEDURE — 99232 SBSQ HOSP IP/OBS MODERATE 35: CPT | Performed by: INTERNAL MEDICINE

## 2023-08-14 PROCEDURE — B2111ZZ FLUOROSCOPY OF MULTIPLE CORONARY ARTERIES USING LOW OSMOLAR CONTRAST: ICD-10-PCS | Performed by: INTERNAL MEDICINE

## 2023-08-14 RX ORDER — CHOLECALCIFEROL (VITAMIN D3) 125 MCG
5 CAPSULE ORAL NIGHTLY
Status: DISCONTINUED | OUTPATIENT
Start: 2023-08-14 | End: 2023-08-15 | Stop reason: HOSPADM

## 2023-08-14 RX ORDER — NITROGLYCERIN 0.4 MG/1
0.4 TABLET SUBLINGUAL
Status: DISCONTINUED | OUTPATIENT
Start: 2023-08-14 | End: 2023-08-15 | Stop reason: SDUPTHER

## 2023-08-14 RX ORDER — HEPARIN SODIUM 1000 [USP'U]/ML
INJECTION, SOLUTION INTRAVENOUS; SUBCUTANEOUS
Status: DISCONTINUED | OUTPATIENT
Start: 2023-08-14 | End: 2023-08-14 | Stop reason: HOSPADM

## 2023-08-14 RX ORDER — ACETAMINOPHEN 325 MG/1
650 TABLET ORAL EVERY 4 HOURS PRN
Status: DISCONTINUED | OUTPATIENT
Start: 2023-08-14 | End: 2023-08-15 | Stop reason: HOSPADM

## 2023-08-14 RX ORDER — FENTANYL CITRATE 50 UG/ML
INJECTION, SOLUTION INTRAMUSCULAR; INTRAVENOUS
Status: DISCONTINUED | OUTPATIENT
Start: 2023-08-14 | End: 2023-08-14 | Stop reason: HOSPADM

## 2023-08-14 RX ORDER — MIDAZOLAM HYDROCHLORIDE 1 MG/ML
INJECTION INTRAMUSCULAR; INTRAVENOUS
Status: DISCONTINUED | OUTPATIENT
Start: 2023-08-14 | End: 2023-08-14 | Stop reason: HOSPADM

## 2023-08-14 RX ORDER — LIDOCAINE HYDROCHLORIDE 10 MG/ML
INJECTION, SOLUTION EPIDURAL; INFILTRATION; INTRACAUDAL; PERINEURAL
Status: DISCONTINUED | OUTPATIENT
Start: 2023-08-14 | End: 2023-08-14 | Stop reason: HOSPADM

## 2023-08-14 RX ORDER — NICARDIPINE HCL-0.9% SOD CHLOR 1 MG/10 ML
SYRINGE (ML) INTRAVENOUS
Status: DISCONTINUED | OUTPATIENT
Start: 2023-08-14 | End: 2023-08-14 | Stop reason: HOSPADM

## 2023-08-14 RX ORDER — IPRATROPIUM BROMIDE AND ALBUTEROL SULFATE 2.5; .5 MG/3ML; MG/3ML
3 SOLUTION RESPIRATORY (INHALATION) EVERY 4 HOURS PRN
Status: DISCONTINUED | OUTPATIENT
Start: 2023-08-14 | End: 2023-08-15 | Stop reason: HOSPADM

## 2023-08-14 RX ORDER — SODIUM CHLORIDE 9 MG/ML
1 INJECTION, SOLUTION INTRAVENOUS CONTINUOUS
Status: ACTIVE | OUTPATIENT
Start: 2023-08-14 | End: 2023-08-14

## 2023-08-14 RX ADMIN — IPRATROPIUM BROMIDE AND ALBUTEROL SULFATE 3 ML: 2.5; .5 SOLUTION RESPIRATORY (INHALATION) at 07:13

## 2023-08-14 RX ADMIN — HYDROCODONE BITARTRATE AND ACETAMINOPHEN 1 TABLET: 7.5; 325 TABLET ORAL at 11:32

## 2023-08-14 RX ADMIN — SUCRALFATE 1 G: 1 TABLET ORAL at 11:25

## 2023-08-14 RX ADMIN — ASPIRIN 81 MG: 81 TABLET, COATED ORAL at 08:08

## 2023-08-14 RX ADMIN — SUCRALFATE 1 G: 1 TABLET ORAL at 07:50

## 2023-08-14 RX ADMIN — LISINOPRIL 5 MG: 5 TABLET ORAL at 08:09

## 2023-08-14 RX ADMIN — SOTALOL HYDROCHLORIDE 80 MG: 80 TABLET ORAL at 08:08

## 2023-08-14 RX ADMIN — SENNOSIDES AND DOCUSATE SODIUM 2 TABLET: 50; 8.6 TABLET ORAL at 20:35

## 2023-08-14 RX ADMIN — TAMSULOSIN HYDROCHLORIDE 0.4 MG: 0.4 CAPSULE ORAL at 08:08

## 2023-08-14 RX ADMIN — SENNOSIDES AND DOCUSATE SODIUM 2 TABLET: 50; 8.6 TABLET ORAL at 08:08

## 2023-08-14 RX ADMIN — HYDROCODONE BITARTRATE AND ACETAMINOPHEN 1 TABLET: 7.5; 325 TABLET ORAL at 17:41

## 2023-08-14 RX ADMIN — IPRATROPIUM BROMIDE AND ALBUTEROL SULFATE 3 ML: 2.5; .5 SOLUTION RESPIRATORY (INHALATION) at 15:18

## 2023-08-14 RX ADMIN — Medication 5 MG: at 20:34

## 2023-08-14 RX ADMIN — SUCRALFATE 1 G: 1 TABLET ORAL at 17:46

## 2023-08-14 RX ADMIN — SOTALOL HYDROCHLORIDE 80 MG: 80 TABLET ORAL at 20:34

## 2023-08-14 RX ADMIN — PANTOPRAZOLE SODIUM 40 MG: 40 TABLET, DELAYED RELEASE ORAL at 07:50

## 2023-08-14 RX ADMIN — IPRATROPIUM BROMIDE AND ALBUTEROL SULFATE 3 ML: 2.5; .5 SOLUTION RESPIRATORY (INHALATION) at 11:15

## 2023-08-14 RX ADMIN — HEPARIN SODIUM 9.5 UNITS/KG/HR: 10000 INJECTION, SOLUTION INTRAVENOUS at 16:43

## 2023-08-14 RX ADMIN — HYDROCODONE BITARTRATE AND ACETAMINOPHEN 1 TABLET: 7.5; 325 TABLET ORAL at 03:03

## 2023-08-14 RX ADMIN — IPRATROPIUM BROMIDE AND ALBUTEROL SULFATE 3 ML: 2.5; .5 SOLUTION RESPIRATORY (INHALATION) at 19:09

## 2023-08-14 RX ADMIN — IPRATROPIUM BROMIDE AND ALBUTEROL SULFATE 3 ML: 2.5; .5 SOLUTION RESPIRATORY (INHALATION) at 03:12

## 2023-08-14 RX ADMIN — AMLODIPINE BESYLATE 5 MG: 5 TABLET ORAL at 08:08

## 2023-08-14 NOTE — PROGRESS NOTES
[facility] Medicine Services  PROGRESS NOTE    Patient Name: Scott Milan  : 1950  MRN: [id number]    Date of Admission: 2023  Primary Care Physician: Tigre Cates MD    Subjective   Subjective     CC:  CP    HPI:  No events overnight, denies any chest pain or left arm pain.  Has been n.p.o. and awaiting left heart cath.    ROS:  Gen- No fevers, chills  CV- No chest pain, palpitations  Resp- No cough, dyspnea  GI- No N/V/D, abd pain       Objective   Objective     Vital Signs:   Temp:  [97.7 øF (36.5 øC)-98.2 øF (36.8 øC)] 98.2 øF (36.8 øC)  Heart Rate:  [70-83] 71  Resp:  [18-20] 18  BP: (102-165)/(62-87) 120/70  Flow (L/min):  [2] 2     Physical Exam:  Constitutional: No acute distress, awake, alert  HENT: NCAT, mucous membranes moist  Respiratory: Clear to auscultation bilaterally, respiratory effort normal   Cardiovascular: RRR, no murmurs, rubs, or gallops  Gastrointestinal: Positive bowel sounds, soft, nontender, nondistended  Musculoskeletal: No bilateral ankle edema  Psychiatric: Appropriate affect, cooperative  Neurologic: Oriented x 3, no focal neuro deficits  Skin: No rashes      Results Reviewed:  LAB RESULTS:      Lab 23  0246 23  1937 23  1207 23  0429 23  1642 23  1504 23  1016 23  1016 23  1822 23  0235   WBC 7.02  --   --  8.42  --  9.98  --  8.88 7.93 9.06   HEMOGLOBIN 11.3*  --   --  10.8*  --  12.1*  --  12.6* 12.4* 13.3   HEMATOCRIT 33.8*  --   --  32.9*  --  36.9*  --  38.9 37.6 40.5   PLATELETS 174  --   --  175  --  193  --  205 194 237   NEUTROS ABS 3.99  --   --  4.82  --   --   --  5.70 5.18 5.76   IMMATURE GRANS (ABS) 0.03  --   --  0.03  --   --   --  0.03 0.01 0.04   LYMPHS ABS 1.93  --   --  2.25  --   --   --  1.87 1.55 1.84   MONOS ABS 0.75  --   --  0.85  --   --   --  0.81 0.76 0.92*   EOS ABS 0.30  --   --  0.43*  --   --   --  0.43* 0.39 0.46*   MCV 89.2  --   --  90.6   --  91.1  --  91.1 92.2 89.6   PROTIME  --   --   --   --   --   --   --  13.3  --   --    APTT  --   --   --   --   --   --   --  30.1*  --   --    HEPARIN ANTI-XA 0.69 0.91* 0.54 0.23* 0.15*  --    < > 0.10*  --   --    D DIMER QUANT  --   --   --   --   --   --   --   --   --  0.38    < > = values in this interval not displayed.         Lab 08/14/23  0246 08/13/23  0429 08/11/23 1822 08/11/23  0235   SODIUM 139 139 141 143   POTASSIUM 4.2 4.1 3.7 3.6   CHLORIDE 106 107 106 106   CO2 22.0 22.0 23.0 24.0   ANION GAP 11.0 10.0 12.0 13.0   BUN 15 19 19 18   CREATININE 0.76 0.85 0.74* 0.90   EGFR 93.7 90.6 94.5 89.1   GLUCOSE 99 97 190* 234*   CALCIUM 8.2* 8.2* 8.6 9.2   MAGNESIUM 2.1  --   --   --    PHOSPHORUS 2.7  --   --   --    HEMOGLOBIN A1C  --   --   --  6.90*   TSH  --   --   --  2.420         Lab 08/11/23 1822 08/11/23  0235   TOTAL PROTEIN 6.5 6.8   ALBUMIN 4.0 4.0   GLOBULIN 2.5 2.8   ALT (SGPT) 15 18   AST (SGOT) 15 19   BILIRUBIN 0.3 0.4   ALK PHOS 81 87   LIPASE 25 21         Lab 08/12/23  1642 08/12/23  1500 08/12/23  1016 08/11/23  1822 08/11/23  0459 08/11/23  0235   PROBNP  --   --   --  205.6  --  78.9   HSTROP T 41* 36*  --  38* 50* 22*   PROTIME  --   --  13.3  --   --   --    INR  --   --  1.00  --   --   --          Lab 08/11/23  0235   CHOLESTEROL 98   LDL CHOL 34   HDL CHOL 29*   TRIGLYCERIDES 222*             Brief Urine Lab Results       None            Microbiology Results Abnormal       None            Adult Transthoracic Echo Complete W/ Cont if Necessary Per Protocol    Result Date: 8/12/2023    Estimated left ventricular EF = 55%   Mild mitral valve regurgitation is present.     Cardiac Catheterization/Vascular Study    Result Date: 8/14/2023    Severe coronary artery disease with noncompliant to medications Evaluation for coronary artery bypass graft      Results for orders placed during the hospital encounter of 08/11/23    Adult Transthoracic Echo Complete W/ Cont if Necessary Per  Protocol    Interpretation Summary    Estimated left ventricular EF = 55%    Mild mitral valve regurgitation is present.      Current medications:  Scheduled Meds:amLODIPine, 5 mg, Oral, Daily  aspirin, 81 mg, Oral, Daily  insulin lispro, 2-9 Units, Subcutaneous, 4x Daily AC & at Bedtime  ipratropium-albuterol, 3 mL, Nebulization, 4x Daily - RT  lisinopril, 5 mg, Oral, Daily  nicotine, 1 patch, Transdermal, Q24H  pantoprazole, 40 mg, Oral, Q AM  pharmacy consult - [place], , Does not apply, Daily  rosuvastatin, 40 mg, Oral, Nightly  senna-docusate sodium, 2 tablet, Oral, BID  sodium chloride, 10 mL, Intravenous, Q12H  sotalol, 80 mg, Oral, Q12H  sucralfate, 1 g, Oral, TID AC  tamsulosin, 0.4 mg, Oral, Daily      Continuous Infusions:heparin, 9.5 Units/kg/hr, Last Rate: Stopped (23 1325)  nitroglycerin, 5-200 mcg/min, Last Rate: 10 mcg/min (23 1113)  Pharmacy to Dose Heparin,       PRN Meds:.  acetaminophen    aluminum-magnesium hydroxide-simethicone    senna-docusate sodium **AND** polyethylene glycol **AND** bisacodyl **AND** bisacodyl    Calcium Replacement - Follow Nurse / BPA Driven Protocol    dextrose    dextrose    glucagon (human recombinant)    HYDROcodone-acetaminophen    ipratropium-albuterol    LORazepam    Magnesium Standard Dose Replacement - Follow Nurse / BPA Driven Protocol    [] Morphine **AND** naloxone    nitroglycerin    ondansetron    Pharmacy to Dose Heparin    Phosphorus Replacement - Follow Nurse / BPA Driven Protocol    Potassium Replacement - Follow Nurse / BPA Driven Protocol    sodium chloride    sodium chloride    Assessment & Plan   Assessment & Plan     Active Hospital Problems    Diagnosis  POA    **NSTEMI (non-ST elevated myocardial infarction) [I21.4]  Yes    Type 2 diabetes mellitus with hyperglycemia, without long-term current use of insulin [E11.65]  Yes    Atrial fibrillation [I48.91]  Yes    Coronary artery disease [I25.10]  Yes    Hypertension [I10]  Yes     Hypercholesterolemia [E78.00]  Yes    Tobacco abuse [Z72.0]  Yes    COPD (chronic obstructive pulmonary disease) [J44.9]  Yes      Resolved Hospital Problems   No resolved problems to display.        Brief Hospital Course to date:  Scott Milan is a 73 y.o. male with a history of CAD and prior stents, allergies to clopidogrel and Effient, hypertension, hyperlipidemia, COPD, tobacco abuse who is presenting with unstable angina with elevated troponins, admitted for NSTEMI.  Cardiology completed left heart cath 8/14 that had severe multivessel coronary artery disease.    NSTEMI / Unstable Angina  CAD with hx of prior stents  HTN  HL  - s/p ASA 325mg in ED  - ALLERGY to effient and plavix -- deferred DAPT load for now  - continue sotalol, crestor, lisinopril  --echo ejection fraction of 55%  - HS troponin now trending down  - [facility] with severe coronary artery disease, recommending evaluation for CABG  -CTS consult    NIDDM2  - hold metformin, SSI ac&hs  - A1c = 6.9%     Ongoing tobacco abuse   COPD  - nicotine patch  - smoking cessation education. Long discussion abt smoking cessation   - duo nebs QID    Anemia:-unspecified  H/H stable.    Expected Discharge Location and Transportation: home  Expected Discharge   Expected Discharge Date: 8/15/2023; Expected Discharge Time:      DVT prophylaxis:  Medical DVT prophylaxis orders are present.          CODE STATUS:   Code Status and Medical Interventions:   Ordered at: 08/11/23 2011     Code Status (Patient has no pulse and is not breathing):    CPR (Attempt to Resuscitate)     Medical Interventions (Patient has pulse or is breathing):    Full Support       Radha Mooney MD  08/14/23

## 2023-08-14 NOTE — CASE MANAGEMENT/SOCIAL WORK
"Discharge Planning Assessment  [facility]     Patient Name: Scott Milan  MRN: [id number]  Today's Date: 8/14/2023    Admit Date: 8/11/2023    Plan: discharge plan   Discharge Needs Assessment       Row Name 08/14/23 1416       Living Environment    People in Home child(juan carlos), adult    Name(s) of People in Home Kain (son), Hilary (dtr) and 3 grandchildren    Unique Family Situation Patient states he rents a room at his daughter's house    Current Living Arrangements home    Primary Care Provided by self    Provides Primary Care For no one, unable/limited ability to care for self    Family Caregiver if Needed child(juan carlos), adult    Family Caregiver Names Kain (son), Hilary (dtr)    Quality of Family Relationships helpful;involved;supportive    Able to Return to Prior Arrangements yes       Transition Planning    Patient/Family Anticipates Transition to family members' home    Patient/Family Anticipated Services at Transition     Transportation Anticipated family or friend will provide       Discharge Needs Assessment    Equipment Currently Used at Home rollator;walker, rolling;cane, straight;respiratory supplies;nebulizer, O2 and respiratorysupplies    Equipment Needed After Discharge cane, straight;rollator;walker, rolling;respiratory supplies;nebulizer and oxygen                   Discharge Plan       Row Name 08/14/23 7984       Plan    Plan discharge plan    Patient/Family in Agreement with Plan yes    Plan Comments Spoke with patient at bedside, he verified he has [employer] Medicare Replacement, with prescription coverage, and uses [employer] for scripts. He is here with NSTEMI and had a heart cath. Per discussion in [facility], patient may need surgery and states \"I'm not going to have the surgery.\" Plan is home with daughterHilary and currently denies discharge needs.    Final Discharge Disposition Code 30 - still a patient                  Continued Care and Services - " Admitted Since 8/11/2023    Coordination has not been started for this encounter.       Expected Discharge Date and Time       Expected Discharge Date Expected Discharge Time    Aug 15, 2023            Demographic Summary       Row Name 08/14/23 1413       General Information    General Information Comments PCP: Loan Landeros       Contact Information    Permission Granted to Share Info With     Contact Information Obtained for     Contact Information Comments Kain Gil (son) [phone], Hilary Barton (dtr) [phone]                   Functional Status       Row Name 08/14/23 1415       Functional Status    Functional Status Comments Independent with ADL's using rolling walker for gain mobility                   Psychosocial    No documentation.                  Abuse/Neglect    No documentation.                  Legal    No documentation.                  Substance Abuse    No documentation.                  Patient Forms    No documentation.                     Tabatha Torres RN

## 2023-08-14 NOTE — PROGRESS NOTES
"[facility] Progress Note     LOS: 3 days   Patient Care Team:  Tigre Cates MD as PCP - General (Family Medicine)  Sai Mukherjee III, MD as Cardiologist (Cardiology)  PCP:  Tigre Catse MD    Chief Complaint: NSTEMI    SUBJECTIVE: Currently chest pain-free.  Mainly complaining of sleep deprivation due to routine vitals at night and lab draws.      Review of Systems:   All systems have been reviewed and are negative with the exception of those mentioned above.      OBJECTIVE:    Vital Sign Min/Max for last 24 hours  Temp  Min: 98.2 øF (36.8 øC)  Max: 98.3 øF (36.8 øC)   BP  Min: 102/62  Max: 165/83   Pulse  Min: 70  Max: 84   Resp  Min: 18  Max: 20   SpO2  Min: 91 %  Max: 98 %   No data recorded   No data recorded     Flowsheet Rows      Flowsheet Row First Filed Value   Admission Height 177.8 cm (70\") Documented at 08/11/2023 1819   Admission Weight 86.6 kg (191 lb) Documented at 08/11/2023 1819                Intake/Output Summary (Last 24 hours) at 8/14/2023 1733  Last data filed at 8/14/2023 1335  Gross per 24 hour   Intake 360 ml   Output 475 ml   Net -115 ml     Intake & Output (last 3 days)         08/11 0701  08/12 0700 08/12 0701  08/13 0700 08/13 0701  08/14 0700 08/14 0701  08/15 0700    P.O.  840 360 360    Total Intake(mL/kg)  840 (9.8) 360 (4.2) 360 (4.2)    Urine (mL/kg/hr)  650 (0.3) 500 (0.2) 475 (0.5)    Stool  0 0     Total Output  650 500 475    Net  +190 -140 -115            Urine Unmeasured Occurrence  7 x 5 x 350 x    Stool Unmeasured Occurrence  1 x 1 x              Physical Exam:    General Appearance:    Alert, cooperative, no distress, appears stated age   Neck:   Supple, symmetrical, trachea midline.   Lungs:     Clear to auscultation bilaterally, respirations unlabored   Chest Wall:    No tenderness or deformity    Heart:    Regular rate and rhythm, S1 and S2 normal, no murmur, rub   or gallop, normal carotid impulse " bilaterally without bruit.   Extremities:   Extremities normal, atraumatic, no cyanosis or edema   Pulses:   2+ and symmetric all extremities   Skin:   Skin color, texture, turgor normal, no rashes or lesions      LABS/DIAGNOSTIC DATA:  Results from last 7 days   Lab Units 08/14/23  0246 08/13/23  0429 08/12/23  1504   WBC 10*3/mm3 7.02 8.42 9.98   HEMOGLOBIN g/dL 11.3* 10.8* 12.1*   HEMATOCRIT % 33.8* 32.9* 36.9*   PLATELETS 10*3/mm3 174 175 193     Lab Results   Lab Value Date/Time    TROPONINT 41 (H) 08/12/2023 1642    TROPONINT 36 (H) 08/12/2023 1500    TROPONINT 38 (H) 08/11/2023 1822    TROPONINT 50 (H) 08/11/2023 0459    TROPONINT 22 (H) 08/11/2023 0235    TROPONINT <0.010 11/16/2021 1600    TROPONINT <0.010 11/16/2021 1404     Results from last 7 days   Lab Units 08/12/23  1016   INR  1.00   APTT seconds 30.1*     Results from last 7 days   Lab Units 08/14/23  0246 08/13/23  0429 08/11/23 1822 08/11/23  0235   SODIUM mmol/L 139 139 141 143   POTASSIUM mmol/L 4.2 4.1 3.7 3.6   CHLORIDE mmol/L 106 107 106 106   CO2 mmol/L 22.0 22.0 23.0 24.0   BUN mg/dL 15 19 19 18   CREATININE mg/dL 0.76 0.85 0.74* 0.90   CALCIUM mg/dL 8.2* 8.2* 8.6 9.2   BILIRUBIN mg/dL  --   --  0.3 0.4   ALK PHOS U/L  --   --  81 87   ALT (SGPT) U/L  --   --  15 18   AST (SGOT) U/L  --   --  15 19   GLUCOSE mg/dL 99 97 190* 234*     Results from last 7 days   Lab Units 08/11/23  0235   HEMOGLOBIN A1C % 6.90*     Results from last 7 days   Lab Units 08/11/23  0235   CHOLESTEROL mg/dL 98   TRIGLYCERIDES mg/dL 222*   HDL CHOL mg/dL 29*   LDL CHOL mg/dL 34     Results from last 7 days   Lab Units 08/11/23  0235   TSH uIU/mL 2.420           Medication Review:   amLODIPine, 5 mg, Oral, Daily  aspirin, 81 mg, Oral, Daily  insulin lispro, 2-9 Units, Subcutaneous, 4x Daily AC & at Bedtime  ipratropium-albuterol, 3 mL, Nebulization, 4x Daily - RT  lisinopril, 5 mg, Oral, Daily  nicotine, 1 patch, Transdermal, Q24H  pantoprazole, 40 mg, Oral, Q  AM  pharmacy consult - [place], , Does not apply, Daily  rosuvastatin, 40 mg, Oral, Nightly  senna-docusate sodium, 2 tablet, Oral, BID  sodium chloride, 10 mL, Intravenous, Q12H  sotalol, 80 mg, Oral, Q12H  sucralfate, 1 g, Oral, TID AC  tamsulosin, 0.4 mg, Oral, Daily             ASSESSMENT/PLAN:    NSTEMI (non-ST elevated myocardial infarction)    Atrial fibrillation    Coronary artery disease    Hypertension    Hypercholesterolemia    Tobacco abuse    COPD (chronic obstructive pulmonary disease)    Type 2 diabetes mellitus with hyperglycemia, without long-term current use of insulin        NSTEMI in the setting of multivessel disease.  Patient does not desire surgical revascularization.  Recommending percutaneous revascularization of culprit lesion in the RCA followed by optimization of medical therapy and close clinical follow-up.          Sai Mukherjee III, MD   08/14/23  17:33 EDT

## 2023-08-14 NOTE — CONSULTS
CTS Consult    Patient Care Team:  Tigre Cates MD as PCP - General (Family Medicine)  Sai Mukherjee III, MD as Cardiologist (Cardiology)      Reason for Consult:  Coronary artery disease    HPI  Patient is a 73 y.o. male with a history of coronary artery disease s/p previous stents, current tobacco smoker, type 2 diabetes mellitus, hypertension, hyperlipidemia and COPD who presented to [facility] ED on 8/11 with complaints of crushing chest pain with radiation to his left arm and associated shortness of breath that awakened him from sleep and was relieved with sublingual nitroglycerin.  Patient reports progressively worsening fatigue and shortness of breath over the last couple of months. Patient was diagnosed with NSTEMI in ED but patient left AMA prior to admission.  He returned to [facility] ED later in the day when chest pressure recurred and underwent cardiac catheterization today which revealed multivessel coronary artery disease including left main disease for which we have been asked to evaluate.  Patient denies current chest pain or shortness of breath.      Review of Systems  Constitutional: Positive for malaise/fatigue.  Negative for chills, fever, night sweats and weight loss.  HENT: Negative for hearing loss, odynophagia and sore throat.    Cardiovascular: Positive for chest pain  Respiratory: Positive for shortness of breath.  Negative for cough and hemoptysis.  Endocrine:  Negative for cold intolerance, heat intolerance, polydipsia, polyphagia and polyuria.  Hematologic/Lymphatic: Negative for easy bruising/bleeding.  Musculoskeletal: Negative for joint pain, joint swelling and myalgias.  Gastrointestinal: Negative for abdominal pain, constipation, diarrhea, hematemesis, hematochezia, melena, nausea and vomiting.  Genitourinary: Negative for dysuria, frequency and hematuria.  Neurological: Negative for focal weakness, headaches, numbness and seizures.  Psychiatric/Behavioral: Negative for depression and  suicidal ideas.  The patient is not nervous/anxious.    All other systems are reviewed and are negative.    History  Past Medical History:   Diagnosis Date    Allergic rhinitis     seasonal    Atrial fibrillation     COPD (chronic obstructive pulmonary disease)     Coronary artery disease     History of cardiac catheterization     Hypercholesterolemia     Hyperlipidemia     Hypertension     Kidney stones     Recurrent chest pain     Tobacco abuse      Past Surgical History:   Procedure Laterality Date    CORONARY ANGIOPLASTY WITH STENT PLACEMENT       Family History   Problem Relation Age of Onset    No Known Problems Mother     No Known Problems Father      Social History     Tobacco Use    Smoking status: Every Day     Packs/day: 2.00     Types: Cigarettes    Smokeless tobacco: Never   Vaping Use    Vaping Use: Never used   Substance Use Topics    Alcohol use: No    Drug use: No     Medications Prior to Admission   Medication Sig Dispense Refill Last Dose    albuterol sulfate  (90 Base) MCG/ACT inhaler As Needed.   8/11/2023    amLODIPine (NORVASC) 5 MG tablet Take 1 tablet by mouth Daily. Need lab work and f/u appt for further refills. 90 tablet 0 8/11/2023    aspirin 81 MG tablet Take 1 tablet by mouth Daily.   8/11/2023    HYDROcodone-acetaminophen (NORCO) 7.5-325 MG per tablet Take 1 tablet by mouth Every 6 (Six) Hours As Needed for Moderate Pain  or Severe Pain . 12 tablet 0 8/10/2023    metFORMIN (GLUCOPHAGE) 500 MG tablet Take 1 tablet by mouth 2 (Two) Times a Day.   8/11/2023    nitroglycerin (NITROSTAT) 0.4 MG SL tablet Place 1 tablet under the tongue Every 5 (Five) Minutes As Needed for Chest Pain. Take no more than 3 doses in 15 minutes. 100 tablet 0     ondansetron ODT (ZOFRAN-ODT) 4 MG disintegrating tablet Place 1 tablet on the tongue Every 8 (Eight) Hours As Needed for Nausea or Vomiting. 12 tablet 0     rosuvastatin (CRESTOR) 20 MG tablet Take 1 tablet by mouth Daily. 90 tablet 1 8/11/2023     sotalol (BETAPACE) 80 MG tablet TAKE ONE (1) TABLET BY MOUTH TWICE A DAY  180 tablet 0 8/11/2023    Spiriva HandiHaler 18 MCG per inhalation capsule As Needed.   8/11/2023    tamsulosin (FLOMAX) 0.4 MG capsule 24 hr capsule Daily.   8/11/2023     Allergies:  Effient [prasugrel], Clopidogrel, and Prednisone    Objective    Vital Signs  Temp:  [98.2 øF (36.8 øC)] 98.2 øF (36.8 øC)  Heart Rate:  [70-83] 79  Resp:  [18-20] 18  BP: (102-165)/(62-87) 122/71    Physical Exam:  General Appearance: alert, appears stated age and cooperative  Head: normocephalic, without obvious abnormality and atraumatic  Throat: gums healthy and no oral lesions  Neck: no adenopathy, suppple, trachea midline, no thyromegaly, no carotid bruit and no JVD  Lungs: clear to auscultation, respirations regular, respirations even and respirations unlabored  Heart: regular rhythm & normal rate, normal S1, S2, no murmur, no lida, no rub and no click  Abdomen: normal bowel sounds, no masses and soft non-tender  Extremities: moves extremities well and no edema  Pulses: Pulses palpable and equal bilaterally  Skin: no bleeding, bruising or rash  Neurologic: Mental Status orientated to person, place, time and situation          Data Review:  Results from last 7 days   Lab Units 08/14/23  0246   WBC 10*3/mm3 7.02   HEMOGLOBIN g/dL 11.3*   HEMATOCRIT % 33.8*   PLATELETS 10*3/mm3 174     Results from last 7 days   Lab Units 08/14/23  0246   SODIUM mmol/L 139   POTASSIUM mmol/L 4.2   CHLORIDE mmol/L 106   CO2 mmol/L 22.0   BUN mg/dL 15   CREATININE mg/dL 0.76   GLUCOSE mg/dL 99   CALCIUM mg/dL 8.2*     Coagulation:   INR   Date Value Ref Range Status   08/12/2023 1.00 0.89 - 1.12 Final     Cardiac markers:   Results from last 7 days   Lab Units 08/12/23  1642   HSTROP T ng/L 41*     ABGs:       Invalid input(s): PO2      Imaging Results (Last 72 Hours)       Procedure Component Value Units Date/Time    XR Chest 1 View [[id number]] Collected: 08/11/23 5333      Updated: 08/11/23 1905    Narrative:      XR CHEST 1 VW    Date of Exam: 8/11/2023 6:20 PM EDT    Indication: Chest Pain Triage Protocol    Comparison: 8/11/2023    Findings:  Heart mediastinum and pulmonary vasculature appear within normal limits. There is very mild bibasilar discoid atelectasis, a little increased from the prior study, but the lungs otherwise appear grossly clear. No edema, effusion or pneumothorax is seen.      Impression:      Impression:  Very mild bibasilar atelectasis. No other evidence of active chest pathology.      Electronically Signed: Bernabe Ceja MD    8/11/2023 7:02 PM EDT    Workstation ID: [id number]              Cardiac catheterization 8/14/23:  Angiographic Findings:  Bilateral coronary dominance      LM:   Distal left main has 60 to 70% disease divides into LAD and circumflex  LAD:  LAD does not have any significant disease  LCX:   Ostial circumflex has haziness with about 80 to 90% disease  RCA:   Mid RCA has about 90 to 95% disease     Echo 8/12/23:   Echocardiogram Findings    Left Ventricle Left ventricular systolic function is normal. Estimated left ventricular EF = 55% Left ventricular ejection fraction appears to be 51 - 55%.     Normal left ventricular cavity size and wall thickness noted.   Right Ventricle Normal right ventricular cavity size and systolic function noted.   Left Atrium Normal left atrial size and volume noted.   Right Atrium The right atrial cavity is mildly dilated.  The inferior vena cava is normally sized. Normal IVC inspiratory collapse of greater than 50% noted.   Aortic Valve The aortic valve is grossly normal in structure. The aortic valve was poorly visualized but appears trileaflet. No significant aortic valve regurgitation is present. No aortic valve stenosis is present.   Mitral Valve Mitral annular calcification is present. Mild mitral valve regurgitation is present. No significant mitral valve stenosis is present.   Tricuspid Valve The  tricuspid valve is structurally normal with no stenosis present. Trace tricuspid valve regurgitation is present. Estimated right ventricular systolic pressure from tricuspid regurgitation is normal (<35 mmHg). Calculated right ventricular systolic pressure from tricuspid regurgitation is 10 mmHg.   Pulmonic Valve The pulmonic valve is structurally normal with no significant stenosis present. There is no significant pulmonic valve regurgitation present.   Greater Vessels No dilation of the aortic root is present. No dilation of the sinuses of Valsalva is present.   Pericardium The pericardium is normal. There is no evidence of pericardial effusion. .         Assessment:        NSTEMI (non-ST elevated myocardial infarction)    Atrial fibrillation    Coronary artery disease    Hypertension    Hypercholesterolemia    Tobacco abuse    COPD (chronic obstructive pulmonary disease)    Type 2 diabetes mellitus with hyperglycemia, without long-term current use of insulin        Plan:  73 y.o. male with a history of coronary artery disease s/p previous stents, current tobacco smoker, type 2 diabetes mellitus, hypertension, hyperlipidemia and COPD who presented to [facility] ED on 8/11 with complaints of crushing chest pain with cardiac catheterization revealing 60-70% distal left main, 80-90% ostial circumflex and 90-95% mid RCA coronary artery disease. Patient states that he does not want to undergo coronary artery bypass grafting at this time.  He would like to be considered for conservative management/possible PCI.       Larissa Tobin PA-C  08/14/23  16:09 EDT

## 2023-08-14 NOTE — CONSULTS
Discussed and taught patient about type 2 diabetes self-management, risk factors, and importance of blood glucose control to reduce complications. Target blood glucose readings and A1c goals per ADA were reviewed. Reviewed with patient current A1c 6.9 and discussed its significance. Signs, symptoms, and treatment of hyperglycemia and hypoglycemia were discussed. Lifestyle changes such as physical activity with MD approval and healthy eating were encouraged. Stressed the importance of strict blood sugar control after surgery to prevent complications such as infection and to promote healing of incision. Encouraged pt to monitor blood sugar at home 2+  times per day and to call PCP if blood sugar is trending kenna. Encouraged to keep record of blood glucose readings to take to follow up appointment with PCP. Patient reports he does have a meter at home, but hasn't been shown how to use it and has not taken it out of the box. Home RX doesn't list what brand this may be. Will return later during this admission for meter teach.

## 2023-08-15 ENCOUNTER — READMISSION MANAGEMENT (OUTPATIENT)
Dept: CALL CENTER | Facility: HOSPITAL | Age: 73
End: 2023-08-15
Payer: MEDICARE

## 2023-08-15 VITALS
OXYGEN SATURATION: 97 % | TEMPERATURE: 96.8 F | HEART RATE: 75 BPM | RESPIRATION RATE: 18 BRPM | BODY MASS INDEX: 26.42 KG/M2 | SYSTOLIC BLOOD PRESSURE: 125 MMHG | DIASTOLIC BLOOD PRESSURE: 68 MMHG | WEIGHT: 188.71 LBS | HEIGHT: 71 IN

## 2023-08-15 PROBLEM — I21.9 TYPE 1 MYOCARDIAL INFARCTION: Status: ACTIVE | Noted: 2023-08-15

## 2023-08-15 LAB
ACT BLD: 305 SECONDS (ref 82–152)
ACT BLD: 317 SECONDS (ref 82–152)
ACT BLD: 347 SECONDS (ref 82–152)
GLUCOSE BLDC GLUCOMTR-MCNC: 108 MG/DL (ref 70–130)
GLUCOSE BLDC GLUCOMTR-MCNC: 228 MG/DL (ref 70–130)

## 2023-08-15 PROCEDURE — C1894 INTRO/SHEATH, NON-LASER: HCPCS | Performed by: INTERNAL MEDICINE

## 2023-08-15 PROCEDURE — C1769 GUIDE WIRE: HCPCS | Performed by: INTERNAL MEDICINE

## 2023-08-15 PROCEDURE — C1874 STENT, COATED/COV W/DEL SYS: HCPCS | Performed by: INTERNAL MEDICINE

## 2023-08-15 PROCEDURE — 94799 UNLISTED PULMONARY SVC/PX: CPT

## 2023-08-15 PROCEDURE — 25510000001 IOPAMIDOL PER 1 ML: Performed by: INTERNAL MEDICINE

## 2023-08-15 PROCEDURE — 92978 ENDOLUMINL IVUS OCT C 1ST: CPT | Performed by: INTERNAL MEDICINE

## 2023-08-15 PROCEDURE — 92928 PRQ TCAT PLMT NTRAC ST 1 LES: CPT | Performed by: INTERNAL MEDICINE

## 2023-08-15 PROCEDURE — 99152 MOD SED SAME PHYS/QHP 5/>YRS: CPT | Performed by: INTERNAL MEDICINE

## 2023-08-15 PROCEDURE — 99153 MOD SED SAME PHYS/QHP EA: CPT | Performed by: INTERNAL MEDICINE

## 2023-08-15 PROCEDURE — 85347 COAGULATION TIME ACTIVATED: CPT

## 2023-08-15 PROCEDURE — C1725 CATH, TRANSLUMIN NON-LASER: HCPCS | Performed by: INTERNAL MEDICINE

## 2023-08-15 PROCEDURE — C9600 PERC DRUG-EL COR STENT SING: HCPCS | Performed by: INTERNAL MEDICINE

## 2023-08-15 PROCEDURE — 25010000002 LORAZEPAM PER 2 MG: Performed by: INTERNAL MEDICINE

## 2023-08-15 PROCEDURE — 027035Z DILATION OF CORONARY ARTERY, ONE ARTERY WITH TWO DRUG-ELUTING INTRALUMINAL DEVICES, PERCUTANEOUS APPROACH: ICD-10-PCS | Performed by: INTERNAL MEDICINE

## 2023-08-15 PROCEDURE — 25010000002 HEPARIN (PORCINE) PER 1000 UNITS: Performed by: INTERNAL MEDICINE

## 2023-08-15 PROCEDURE — 25010000002 FENTANYL CITRATE (PF) 50 MCG/ML SOLUTION: Performed by: INTERNAL MEDICINE

## 2023-08-15 PROCEDURE — C1887 CATHETER, GUIDING: HCPCS | Performed by: INTERNAL MEDICINE

## 2023-08-15 PROCEDURE — 99231 SBSQ HOSP IP/OBS SF/LOW 25: CPT | Performed by: PHYSICIAN ASSISTANT

## 2023-08-15 PROCEDURE — C1753 CATH, INTRAVAS ULTRASOUND: HCPCS | Performed by: INTERNAL MEDICINE

## 2023-08-15 PROCEDURE — 25010000002 MIDAZOLAM PER 1 MG: Performed by: INTERNAL MEDICINE

## 2023-08-15 PROCEDURE — 82948 REAGENT STRIP/BLOOD GLUCOSE: CPT

## 2023-08-15 PROCEDURE — B221Z2Z COMPUTERIZED TOMOGRAPHY (CT SCAN) OF MULTIPLE CORONARY ARTERIES USING INTRAVASCULAR OPTICAL COHERENCE: ICD-10-PCS | Performed by: INTERNAL MEDICINE

## 2023-08-15 DEVICE — XIENCE SKYPOINT™ EVEROLIMUS ELUTING CORONARY STENT SYSTEM 3.00 MM X 38 MM / RAPID-EXCHANGE
Type: IMPLANTABLE DEVICE | Site: HEART | Status: FUNCTIONAL
Brand: XIENCE SKYPOINT™

## 2023-08-15 DEVICE — XIENCE SKYPOINT™ EVEROLIMUS ELUTING CORONARY STENT SYSTEM 3.00 MM X 15 MM / RAPID-EXCHANGE
Type: IMPLANTABLE DEVICE | Site: HEART | Status: FUNCTIONAL
Brand: XIENCE SKYPOINT™

## 2023-08-15 RX ORDER — SODIUM CHLORIDE 9 MG/ML
1 INJECTION, SOLUTION INTRAVENOUS CONTINUOUS
Status: ACTIVE | OUTPATIENT
Start: 2023-08-15 | End: 2023-08-15

## 2023-08-15 RX ORDER — FENTANYL CITRATE 50 UG/ML
INJECTION, SOLUTION INTRAMUSCULAR; INTRAVENOUS
Status: DISCONTINUED | OUTPATIENT
Start: 2023-08-15 | End: 2023-08-15 | Stop reason: HOSPADM

## 2023-08-15 RX ORDER — LISINOPRIL 5 MG/1
5 TABLET ORAL DAILY
Qty: 30 TABLET | Refills: 0 | Status: SHIPPED | OUTPATIENT
Start: 2023-08-16

## 2023-08-15 RX ORDER — PANTOPRAZOLE SODIUM 40 MG/1
40 TABLET, DELAYED RELEASE ORAL
Qty: 30 TABLET | Refills: 0 | Status: SHIPPED | OUTPATIENT
Start: 2023-08-16

## 2023-08-15 RX ORDER — NITROGLYCERIN 0.4 MG/1
0.4 TABLET SUBLINGUAL
Status: DISCONTINUED | OUTPATIENT
Start: 2023-08-15 | End: 2023-08-15 | Stop reason: HOSPADM

## 2023-08-15 RX ORDER — CLOPIDOGREL BISULFATE 75 MG/1
75 TABLET ORAL DAILY
Status: DISCONTINUED | OUTPATIENT
Start: 2023-08-16 | End: 2023-08-15

## 2023-08-15 RX ORDER — LIDOCAINE HYDROCHLORIDE 10 MG/ML
INJECTION, SOLUTION EPIDURAL; INFILTRATION; INTRACAUDAL; PERINEURAL
Status: DISCONTINUED | OUTPATIENT
Start: 2023-08-15 | End: 2023-08-15 | Stop reason: HOSPADM

## 2023-08-15 RX ORDER — LORAZEPAM 2 MG/ML
INJECTION INTRAMUSCULAR
Status: DISCONTINUED | OUTPATIENT
Start: 2023-08-15 | End: 2023-08-15 | Stop reason: HOSPADM

## 2023-08-15 RX ORDER — FLUTICASONE PROPIONATE AND SALMETEROL 500; 50 UG/1; UG/1
1 POWDER RESPIRATORY (INHALATION) 2 TIMES DAILY
COMMUNITY

## 2023-08-15 RX ORDER — AMLODIPINE BESYLATE 10 MG/1
10 TABLET ORAL DAILY
COMMUNITY

## 2023-08-15 RX ORDER — HEPARIN SODIUM 1000 [USP'U]/ML
INJECTION, SOLUTION INTRAVENOUS; SUBCUTANEOUS
Status: DISCONTINUED | OUTPATIENT
Start: 2023-08-15 | End: 2023-08-15 | Stop reason: HOSPADM

## 2023-08-15 RX ORDER — NICARDIPINE HCL-0.9% SOD CHLOR 1 MG/10 ML
SYRINGE (ML) INTRAVENOUS
Status: DISCONTINUED | OUTPATIENT
Start: 2023-08-15 | End: 2023-08-15 | Stop reason: HOSPADM

## 2023-08-15 RX ORDER — MIDAZOLAM HYDROCHLORIDE 1 MG/ML
INJECTION INTRAMUSCULAR; INTRAVENOUS
Status: DISCONTINUED | OUTPATIENT
Start: 2023-08-15 | End: 2023-08-15 | Stop reason: HOSPADM

## 2023-08-15 RX ADMIN — ROSUVASTATIN 40 MG: 20 TABLET, FILM COATED ORAL at 09:00

## 2023-08-15 RX ADMIN — AMLODIPINE BESYLATE 5 MG: 5 TABLET ORAL at 09:00

## 2023-08-15 RX ADMIN — Medication 10 ML: at 09:00

## 2023-08-15 RX ADMIN — SENNOSIDES AND DOCUSATE SODIUM 2 TABLET: 50; 8.6 TABLET ORAL at 08:59

## 2023-08-15 RX ADMIN — ASPIRIN 81 MG: 81 TABLET, COATED ORAL at 09:00

## 2023-08-15 RX ADMIN — LISINOPRIL 5 MG: 5 TABLET ORAL at 09:00

## 2023-08-15 RX ADMIN — TAMSULOSIN HYDROCHLORIDE 0.4 MG: 0.4 CAPSULE ORAL at 09:00

## 2023-08-15 RX ADMIN — IPRATROPIUM BROMIDE AND ALBUTEROL SULFATE 3 ML: 2.5; .5 SOLUTION RESPIRATORY (INHALATION) at 08:56

## 2023-08-15 RX ADMIN — SUCRALFATE 1 G: 1 TABLET ORAL at 09:00

## 2023-08-15 RX ADMIN — SODIUM CHLORIDE 1 ML/KG/HR: 9 INJECTION, SOLUTION INTRAVENOUS at 14:09

## 2023-08-15 RX ADMIN — HYDROCODONE BITARTRATE AND ACETAMINOPHEN 1 TABLET: 7.5; 325 TABLET ORAL at 01:23

## 2023-08-15 RX ADMIN — SOTALOL HYDROCHLORIDE 80 MG: 80 TABLET ORAL at 09:00

## 2023-08-15 RX ADMIN — SUCRALFATE 1 G: 1 TABLET ORAL at 17:15

## 2023-08-15 RX ADMIN — HYDROCODONE BITARTRATE AND ACETAMINOPHEN 1 TABLET: 7.5; 325 TABLET ORAL at 09:08

## 2023-08-15 NOTE — PROGRESS NOTES
Referral received for Phase II Cardiac Rehab.  Staff reviewed chart and patient has qualifying diagnosis for Phase II Cardiac Rehab. Patient is planning for cardiac catheterization with possible PCI. Staff will follow up with patient after procedure.

## 2023-08-15 NOTE — PROGRESS NOTES
I was asked by Dr. Mukherjee to perform PCI of the RCA culprit lesion.  I met with the patient, he stated he did not want coronary artery bypass grafting surgery due to severe COPD and his age.  We discussed PCI in detail, he has had 3 stents in the past and is familiar with the procedure and is willing to proceed.  The risks, benefits, and alternative options of cardiac catheterization were discussed with the patient.  The risks include death, MI, stroke, infection, vascular injury requiring surgical repair and/or blood transfusion, coronary dissection, renal dysfunction/failure, allergic reaction, emergent CABG.  If PCI is needed there is a 1-2% risk of emergent CABG.  The patient is agreeable for cardiac catheterization, possible PCI or CABG. Plan is to proceed with cardiac catheterization and possible PCI.     Foster Andrew MD, [facility], [facility]  Interventional Cardiology  08/15/23  09:47 EDT

## 2023-08-15 NOTE — PROGRESS NOTES
CTS Progress Note      Chief Complaint: Coronary artery disease      Subjective  In bed.  Conversant.  Pleasant.  States he is talked with his cardiologist and his decided to pursue coronary stents versus surgery.  States he had some chest pain last night but is currently pain-free      Objective    Physical Exam:   Vital Signs   Temp:  [97.4 øF (36.3 øC)-98.3 øF (36.8 øC)] 97.4 øF (36.3 øC)  Heart Rate:  [68-84] 68  Resp:  [18-20] 18  BP: (102-165)/(60-83) 115/60   GEN: NAD   CV: Regular rate and rhythm, distant no murmur rub or gallop.    RESP: Clear to auscultation bilaterally, distant.  Unlabored on room air   ABD: Soft nontender positive bowel sound    EXT: Warm to the touch no significant edema   Neuro: Light and oriented x3       Results     Results from last 7 days   Lab Units 08/14/23  0246   WBC 10*3/mm3 7.02   HEMOGLOBIN g/dL 11.3*   HEMATOCRIT % 33.8*   PLATELETS 10*3/mm3 174     Results from last 7 days   Lab Units 08/14/23  0246   SODIUM mmol/L 139   POTASSIUM mmol/L 4.2   CHLORIDE mmol/L 106   CO2 mmol/L 22.0   BUN mg/dL 15   CREATININE mg/dL 0.76   GLUCOSE mg/dL 99   CALCIUM mg/dL 8.2*     Results from last 7 days   Lab Units 08/12/23  1016   INR  1.00   APTT seconds 30.1*           Assessment & Plan   Severe coronary disease with left main disease, ejection fraction 55%      NSTEMI (non-ST elevated myocardial infarction)    Atrial fibrillation    Coronary artery disease    Hypertension    Hypercholesterolemia    Tobacco abuse    COPD (chronic obstructive pulmonary disease)    Type 2 diabetes mellitus with hyperglycemia, without long-term current use of insulin        Plan   Patient is currently stating that he has talked with his cardiologist and will undergo coronary stenting today.  CT surgery will sign off.  Please call if needed.  Discussed with the patient and told him that if he wanted further discussion about surgical intervention or changes his mind and wishes to pursue surgical  intervention to chest let us know.    JORDYN Eller  08/15/23  07:16 EDT

## 2023-08-15 NOTE — OUTREACH NOTE
Prep Survey      Flowsheet Row Responses   [facility] facility patient discharged from? [place]   Is LACE score < 7 ? No   Eligibility Readm Mgmt   Discharge diagnosis NSTEMI   Does the patient have one of the following disease processes/diagnoses(primary or secondary)? Acute MI (STEMI,NSTEMI)   Does the patient have Home health ordered? No   Is there a DME ordered? No   Prep survey completed? Yes            Dionna FLOWERS - Registered Nurse

## 2023-08-15 NOTE — PAYOR COMM NOTE
"Scott Isaacs W \"Crow\" (73 y.o. Male)     [id number]     Shira Palomares RN  Utilization Review  [phone]  [phone]        Date of Birth   1950    Social Security Number       Address   [address] [place] [address]    Home Phone   [phone]    MRN   [id number]       Uatsdin   None    Marital Status                               Admission Date   8/11/23    Admission Type   Emergency    Admitting Provider   Radha Mooney MD    Attending Provider   Radha Mooney MD    Department, Room/Bed   [facility] 6A, N602/1       Discharge Date       Discharge Disposition       Discharge Destination                                 Attending Provider: Radha Mooney MD    Allergies: Effient [Prasugrel], Clopidogrel, Prednisone    Isolation: None   Infection: None   Code Status: CPR    Ht: 180.3 cm (70.98\")   Wt: 85.6 kg (188 lb 11.4 oz)    Admission Cmt: None   Principal Problem: NSTEMI (non-ST elevated myocardial infarction) [I21.4]                   Active Insurance as of 8/11/2023       Primary Coverage       Payor Plan Insurance Group Employer/Plan Group    [facility] MEDICARE REPLACEMENT [facility] MEDICARE REPLACEMENT [id number]       Payor Plan Address Payor Plan Phone Number Payor Plan Fax Number Effective Dates    [address] [address]   4/1/2023 - None Entered    [facility] [id number]         Subscriber Name Subscriber Birth Date Member ID       SCOTT ISAACS 1950 [id number]                     Emergency Contacts        (Rel.) Home Phone Work Phone Mobile Phone    IVY CHOUDHURY (Daughter) -- -- [phone]    NATALI CHOUDHURY (Son) [phone] -- [phone]    JAGRUTI GARCIA [phone] -- --    Brad Villareal -- -- --                 History & Physical        Yajaira Santos MD at 08/11/23 2011              [facility] Medicine Services  HISTORY AND PHYSICAL    Patient " Name: Scott Milan  : 1950  MRN: [id number]  Primary Care Physician: Tigre Cates MD  Date of admission: 2023      Subjective   Subjective     Chief Complaint:  Unstable angina sx's     HPI:  Scott Milan is a 73 y.o. male with known hx CAD and prior stents, ongoing tobacco smoker, NIDDM2, HTN, HL, COPD originallly presented to [facility] ED earlier this date 23 after being awoken from sleep in early hours by crushing chest pain with associated radiation to his left arm and dyspnea which was relieved with aspirin and nitro.  He also gives a history of approximately 2 months of increasing fatigue and JETER out of the ordinary to his previous baseline.    Initial ED evaluation with at bedtime troponin elevation consistent with NSTEMI, patient was advised for admission to the hospital and further evaluation however before he could be officially seen by the hospitalist service patient had left AMA.    Since being home during the day today, patient began experiencing midsternal chest pressure again in the mid-to-late afternoon.  Again associated with dyspnea, radiation to the left arm, this time chest pressure took longer to resolve.    Upon repeat evaluation in the ED, patient's at bedtime troponin is still elevated but is showing a downward trend at this point.  Once patient was placed on nitroglycerin drip he has had no further chest pain.          Review of Systems   Gen- No fevers, chills, HA, uncontrolled pain  CV-see above.  No new edema or palpitations  Resp- No SOA, cough  GI- No N/V/D, abd pain, constipation  Skin - No rash      Personal History     Past Medical History:   Diagnosis Date    Allergic rhinitis     seasonal    Atrial fibrillation     COPD (chronic obstructive pulmonary disease)     Coronary artery disease     History of cardiac catheterization     Hypercholesterolemia     Hyperlipidemia     Hypertension     Kidney stones     Recurrent chest pain     Tobacco abuse               Past Surgical History:   Procedure Laterality Date    CORONARY ANGIOPLASTY WITH STENT PLACEMENT         Family History: family history includes No Known Problems in his father and mother.     Social History:  reports that he has been smoking cigarettes. He has been smoking an average of 2 packs per day. He has never used smokeless tobacco. He reports that he does not drink alcohol and does not use drugs.  Social History     Social History Narrative    Not on file       Medications:  Available home medication information reviewed.  (Not in a hospital admission)      Allergies   Allergen Reactions    Effient [Prasugrel] Rash    Clopidogrel Other (See Comments)     red, hot, flushed face    Prednisone Unknown - Low Severity       Objective   Objective     Vital Signs:   Temp:  [97.8 øF (36.6 øC)-98.1 øF (36.7 øC)] 98.1 øF (36.7 øC)  Heart Rate:  [] 79  Resp:  [16-21] 21  BP: (114-154)/(60-88) 128/67  Flow (L/min):  [2-3] 3       Physical Exam   Constitutional: No acute distress, awake, alert, nontoxic, centrally overweight body habitus  Respiratory: Slight end expiratory wheezes,, good effort, nonlabored respirations on 2 L  Cardiovascular: RRR, no murmur  Gastrointestinal: Positive bowel sounds, soft, nontender, nondistended  Musculoskeletal: No peripheral edema, normal muscle tone for age  Psychiatric: Appropriate affect, good insight and judgement, cooperative  Neurologic: Oriented x 3, movements symmetric BUE and BLE, Cranial Nerves grossly intact, speech clear and fluent  Skin: No rashes, no jaundice, no petechiae, no mottling      LAB RESULTS:      Lab 08/11/23 1822 08/11/23  0235   WBC 7.93 9.06   HEMOGLOBIN 12.4* 13.3   HEMATOCRIT 37.6 40.5   PLATELETS 194 237   NEUTROS ABS 5.18 5.76   IMMATURE GRANS (ABS) 0.01 0.04   LYMPHS ABS 1.55 1.84   MONOS ABS 0.76 0.92*   EOS ABS 0.39 0.46*   MCV 92.2 89.6   D DIMER QUANT  --  0.38         Lab 08/11/23 1822 08/11/23  0235   SODIUM 141 143   POTASSIUM  3.7 3.6   CHLORIDE 106 106   CO2 23.0 24.0   ANION GAP 12.0 13.0   BUN 19 18   CREATININE 0.74* 0.90   EGFR 94.5 89.1   GLUCOSE 190* 234*   CALCIUM 8.6 9.2   HEMOGLOBIN A1C  --  6.90*   TSH  --  2.420         Lab 08/11/23  1822 08/11/23  0235   TOTAL PROTEIN 6.5 6.8   ALBUMIN 4.0 4.0   GLOBULIN 2.5 2.8   ALT (SGPT) 15 18   AST (SGOT) 15 19   BILIRUBIN 0.3 0.4   ALK PHOS 81 87   LIPASE 25 21         Lab 08/11/23  1822 08/11/23  0459 08/11/23  0235   PROBNP 205.6  --  78.9   HSTROP T 38* 50* 22*         Lab 08/11/23  0235   CHOLESTEROL 98   LDL CHOL 34   HDL CHOL 29*   TRIGLYCERIDES 222*                 Microbiology Results (last 10 days)       ** No results found for the last 240 hours. **            XR Chest 1 View    Result Date: 8/11/2023  XR CHEST 1 VW Date of Exam: 8/11/2023 6:20 PM EDT Indication: Chest Pain Triage Protocol Comparison: 8/11/2023 Findings: Heart mediastinum and pulmonary vasculature appear within normal limits. There is very mild bibasilar discoid atelectasis, a little increased from the prior study, but the lungs otherwise appear grossly clear. No edema, effusion or pneumothorax is seen.     Impression: Impression: Very mild bibasilar atelectasis. No other evidence of active chest pathology. Electronically Signed: Bernabe Ceja MD  8/11/2023 7:02 PM EDT  Workstation ID: [id number]    XR Chest 1 View    Result Date: 8/11/2023  XR CHEST 1 VW Date of Exam: 8/11/2023 2:41 AM EDT Indication: Chest Pain Triage Protocol Comparison: 11/16/2021. Findings: There is no pneumothorax, pleural effusion or focal airspace consolidation. Heart size and pulmonary vasculature appear within normal limits. Regional bones appear intact.     Impression: Impression: No acute cardiopulmonary abnormality. Electronically Signed: Abrahan Baugh MD  8/11/2023 2:59 AM EDT  Workstation ID: [id number]         Assessment & Plan   Assessment & Plan     Active Hospital Problems    Diagnosis  POA    **NSTEMI (non-ST elevated myocardial  infarction) [I21.4]  Yes    Type 2 diabetes mellitus with hyperglycemia, without long-term current use of insulin [E11.65]  Yes    Atrial fibrillation [I48.91]  Yes     A. CHADS-2 score = 2  B. Symptomatic with dyspnea and palpitations  C. Currently on no anticoagulation  D. Status post BRANDON-guided cardioversion, January 2014      Coronary artery disease [I25.10]  Yes           A.  [facility], February 20, 2015: ANALI to OM, 50% occlusion of mid RCA, 60-70% occlusion of LAD.        B.  scheduled functional assessment via left heart catheterization 3 weeks after intervention, patient did not keep appointment.        C.  recurrent chest pain with left heart catheterization, May 12, 2015: Drug-eluting stent to LAD, drug-eluting stent to the RCA.  EF 50%.      Hypertension [I10]  Yes    Hypercholesterolemia [E78.00]  Yes    Tobacco abuse [Z72.0]  Yes    COPD (chronic obstructive pulmonary disease) [J44.9]  Yes       NSTEMI / Unstable Angina  CAD with hx of prior stents  HTN  HL  - PRO = 6  - s/p ASA 325mg in ED  - ALLERGY to effient and plavix -- will defer DAPT load for now  - give 1mg/kg Lovenox x1  - continue nitro gtt -- currently CP free  - continue home sotolol, increase home crestor to 40mg, add low dose lisinopril   - HS troponin now trending down  - consult cardiology (Dr. Mukherjee) in am   - NPO p 5am for possible [facility] eval     NIDDM2  - hold metformin, SSI ac&hs  - A1c = 6.9%    Ongoing tobacco abuse   COPD  - nicotine patch  - smoking cessation education  - duo nebs QID    DVT prophylaxis:  Lovenox x1 for now      CODE STATUS:    Code Status and Medical Interventions:   Ordered at: 08/11/23 2011     Code Status (Patient has no pulse and is not breathing):    CPR (Attempt to Resuscitate)     Medical Interventions (Patient has pulse or is breathing):    Full Support       Expected Discharge 8/13/23  Expected discharge date/ time has not been documented.     Yajaira Santos MD  08/11/23      Electronically signed by Tom  MD Yajaira at 08/11/23 2224          Emergency Department Notes        Sanchez Crespo DO at 08/11/23 1846            [facility]    EMERGENCY DEPARTMENT ENCOUNTER      Pt Name: Scott Milan  MRN: [id number]  YOB: 1950  Date of evaluation: 8/11/2023  Provider: Sanchez Crespo DO    CHIEF COMPLAINT       Chief Complaint   Patient presents with    Chest Pain         HISTORY OF PRESENT ILLNESS  (Location/Symptom, Timing/Onset, Context/Setting, Quality, Duration, Modifying Factors, Severity.)   Scott Milan is a 73 y.o. male who presents to the emergency department via EMS for evaluation of chest pain sharp and burning sensation with redness to his bilateral shoulders as well as his left arm.  The start about 1 hour prior to arrival.  He states he was here overnight early hours of the morning with similar symptoms, had a work-up and was recommended for admission to the hospital given his significant cardiac history but he decided to leave AGAINST MEDICAL ADVICE at that time.  He went home, was doing well throughout the day today until the recurrence of his chest pain this evening.  He has multiple stents, is on a baby aspirin, denies any nausea vomiting fever chills or difficulty breathing.  He does feel with his recurrent symptoms that admission at this time would probably be reasonable as he has not had a catheterization for over 5 years.  He does follow with Dr. Mukherjee with cardiology, denies any other acute systemic complaints.  States his chest pain has improved upon arrival to the emergency department this evening.  Has a history of COPD, wears nasal cannula oxygen 24/7, he does continue to smoke.      Nursing notes were reviewed.      PAST MEDICAL HISTORY     Past Medical History:   Diagnosis Date    Allergic rhinitis     seasonal    Atrial fibrillation     COPD (chronic obstructive pulmonary disease)     Coronary artery disease     History of cardiac catheterization      Hypercholesterolemia     Hyperlipidemia     Hypertension     Kidney stones     Recurrent chest pain     Tobacco abuse          SURGICAL HISTORY       Past Surgical History:   Procedure Laterality Date    CORONARY ANGIOPLASTY WITH STENT PLACEMENT           CURRENT MEDICATIONS       Current Facility-Administered Medications:     aspirin chewable tablet 324 mg, 324 mg, Oral, Once, Sanchez Crespo, DO    nitroglycerin (TRIDIL) 200 mcg/ml infusion, 5-200 mcg/min, Intravenous, Titrated, Sanchez Crespo, DO    sodium chloride 0.9 % flush 10 mL, 10 mL, Intravenous, PRN, Sanchez Crespo, DO    Current Outpatient Medications:     albuterol sulfate  (90 Base) MCG/ACT inhaler, As Needed., Disp: , Rfl:     amLODIPine (NORVASC) 5 MG tablet, Take 1 tablet by mouth Daily. Need lab work and f/u appt for further refills., Disp: 90 tablet, Rfl: 0    aspirin 81 MG tablet, Take 81 mg by mouth Daily., Disp: , Rfl:     HYDROcodone-acetaminophen (NORCO) 7.5-325 MG per tablet, Take 1 tablet by mouth Every 6 (Six) Hours As Needed for Moderate Pain  or Severe Pain ., Disp: 12 tablet, Rfl: 0    metFORMIN (GLUCOPHAGE) 500 MG tablet, Take 500 mg by mouth 2 (Two) Times a Day., Disp: , Rfl:     nitroglycerin (NITROSTAT) 0.4 MG SL tablet, Place 1 tablet under the tongue Every 5 (Five) Minutes As Needed for Chest Pain. Take no more than 3 doses in 15 minutes., Disp: 100 tablet, Rfl: 0    ondansetron ODT (ZOFRAN-ODT) 4 MG disintegrating tablet, Place 1 tablet on the tongue Every 8 (Eight) Hours As Needed for Nausea or Vomiting., Disp: 12 tablet, Rfl: 0    rosuvastatin (CRESTOR) 20 MG tablet, Take 1 tablet by mouth Daily., Disp: 90 tablet, Rfl: 1    sotalol (BETAPACE) 80 MG tablet, TAKE ONE (1) TABLET BY MOUTH TWICE A DAY , Disp: 180 tablet, Rfl: 0    Spiriva HandiHaler 18 MCG per inhalation capsule, As Needed., Disp: , Rfl:     tamsulosin (FLOMAX) 0.4 MG capsule 24 hr capsule, Daily., Disp: , Rfl:     ALLERGIES     Effient  "[prasugrel], Clopidogrel, and Prednisone    FAMILY HISTORY       Family History   Problem Relation Age of Onset    No Known Problems Mother     No Known Problems Father           SOCIAL HISTORY       Social History     Socioeconomic History    Marital status:    Tobacco Use    Smoking status: Every Day     Packs/day: 2.00     Types: Cigarettes    Smokeless tobacco: Never   Vaping Use    Vaping Use: Never used   Substance and Sexual Activity    Alcohol use: No    Drug use: No    Sexual activity: Defer         PHYSICAL EXAM    (up to 7 for level 4, 8 or more for level 5)     Vitals:    08/11/23 1819 08/11/23 1825   BP:  117/65   BP Location:  Left arm   Patient Position:  Lying   Pulse: 88    Resp: 21    Temp:  98.1 øF (36.7 øC)   SpO2: 96%    Weight: 86.6 kg (191 lb)    Height: 177.8 cm (70\")        Physical Exam  General : Patient is awake, alert, oriented, in no acute distress, nontoxic appearing  HEENT: Pupils are equally round, EOMI, conjunctivae clear  Neck: Neck is supple, full range of motion, trachea midline  Cardiac: Heart regular rate, rhythm, no murmurs, rubs, or gallops  Lungs: Lungs are clear to auscultation, there is no wheezing, rhonchi, or rales. There is no use of accessory muscles, nasal cannula oxygen in place, pulse ox 97%.  Chest wall: There is no tenderness to palpation over the chest wall or over ribs  Abdomen: Abdomen is soft, nontender, nondistended. There are no firm or pulsatile masses, no rebound rigidity or guarding  Musculoskeletal: 5 out of 5 strength in all 4 extremities.  No focal muscle deficits are appreciated  Neuro: Motor intact, sensory intact, level of consciousness is normal  Dermatology: Skin is warm and dry  Psych: Mentation is grossly normal, cognition is grossly normal. Affect is appropriate      DIAGNOSTIC RESULTS     EKG:  All EKGs are interpreted by the Emergency Department Physician who either signs or Co-signs this chart in the absence of a cardiologist.    ECG " 12 Lead ED Triage Standing Order; Chest Pain   Preliminary Result   Test Reason : ED Triage Standing Order~   Blood Pressure :   */*   mmHG   Vent. Rate :  87 BPM     Atrial Rate :  87 BPM      P-R Int : 158 ms          QRS Dur : 104 ms       QT Int : 406 ms       P-R-T Axes :  55  61  41 degrees      QTc Int : 488 ms      Normal sinus rhythm   Incomplete right bundle branch block   Prolonged QT   Abnormal ECG   When compared with ECG of 11-AUG-2023 04:57, (Unconfirmed)   No significant change was found      Referred By: ERMD           Confirmed By:       ECG 12 Lead ED Triage Standing Order; Chest Pain    (Results Pending)       RADIOLOGY:     [] Radiologist's Report Reviewed:  XR Chest 1 View   Final Result   Impression:   Very mild bibasilar atelectasis. No other evidence of active chest pathology.         Electronically Signed: Bernabe Ceja MD     8/11/2023 7:02 PM EDT     Workstation ID: [id number]          I ordered and independently reviewed the above noted radiographic studies.      I viewed images of chest x-ray which showed no acute cardiopulmonary process per my independent interpretation.    See radiologist's dictation for official interpretation.      ED BEDSIDE ULTRASOUND:   Performed by ED Physician - none    LABS:    I have reviewed and interpreted all of the currently available lab results from this visit (if applicable):  Results for orders placed or performed during the hospital encounter of 08/11/23   High Sensitivity Troponin T    Specimen: Blood   Result Value Ref Range    HS Troponin T 38 (H) <15 ng/L   Comprehensive Metabolic Panel    Specimen: Blood   Result Value Ref Range    Glucose 190 (H) 65 - 99 mg/dL    BUN 19 8 - 23 mg/dL    Creatinine 0.74 (L) 0.76 - 1.27 mg/dL    Sodium 141 136 - 145 mmol/L    Potassium 3.7 3.5 - 5.2 mmol/L    Chloride 106 98 - 107 mmol/L    CO2 23.0 22.0 - 29.0 mmol/L    Calcium 8.6 8.6 - 10.5 mg/dL    Total Protein 6.5 6.0 - 8.5 g/dL    Albumin 4.0 3.5 - 5.2 g/dL     ALT (SGPT) 15 1 - 41 U/L    AST (SGOT) 15 1 - 40 U/L    Alkaline Phosphatase 81 39 - 117 U/L    Total Bilirubin 0.3 0.0 - 1.2 mg/dL    Globulin 2.5 gm/dL    A/G Ratio 1.6 g/dL    BUN/Creatinine Ratio 25.7 (H) 7.0 - 25.0    Anion Gap 12.0 5.0 - 15.0 mmol/L    eGFR 94.5 >60.0 mL/min/1.73   Lipase    Specimen: Blood   Result Value Ref Range    Lipase 25 13 - 60 U/L   BNP    Specimen: Blood   Result Value Ref Range    proBNP 205.6 0.0 - 1,800.0 pg/mL   CBC Auto Differential    Specimen: Blood   Result Value Ref Range    WBC 7.93 3.40 - 10.80 10*3/mm3    RBC 4.08 (L) 4.14 - 5.80 10*6/mm3    Hemoglobin 12.4 (L) 13.0 - 17.7 g/dL    Hematocrit 37.6 37.5 - 51.0 %    MCV 92.2 79.0 - 97.0 fL    MCH 30.4 26.6 - 33.0 pg    MCHC 33.0 31.5 - 35.7 g/dL    RDW 12.7 12.3 - 15.4 %    RDW-SD 42.7 37.0 - 54.0 fl    MPV 10.1 6.0 - 12.0 fL    Platelets 194 140 - 450 10*3/mm3    Neutrophil % 65.4 42.7 - 76.0 %    Lymphocyte % 19.5 (L) 19.6 - 45.3 %    Monocyte % 9.6 5.0 - 12.0 %    Eosinophil % 4.9 0.3 - 6.2 %    Basophil % 0.5 0.0 - 1.5 %    Immature Grans % 0.1 0.0 - 0.5 %    Neutrophils, Absolute 5.18 1.70 - 7.00 10*3/mm3    Lymphocytes, Absolute 1.55 0.70 - 3.10 10*3/mm3    Monocytes, Absolute 0.76 0.10 - 0.90 10*3/mm3    Eosinophils, Absolute 0.39 0.00 - 0.40 10*3/mm3    Basophils, Absolute 0.04 0.00 - 0.20 10*3/mm3    Immature Grans, Absolute 0.01 0.00 - 0.05 10*3/mm3    nRBC 0.0 0.0 - 0.2 /100 WBC   ECG 12 Lead ED Triage Standing Order; Chest Pain   Result Value Ref Range    QT Interval 406 ms    QTC Interval 488 ms        If labs were ordered, I independently reviewed the results and considered them in treating the patient.      EMERGENCY DEPARTMENT COURSE and DIFFERENTIAL DIAGNOSIS/MDM:   Vitals:  AS OF 19:19 EDT    BP - 117/65  HR - 88  TEMP - 98.1 øF (36.7 øC)  O2 SATS - 96%      Orders placed during this visit:  Orders Placed This Encounter   Procedures    XR Chest 1 View    Gwynn Draw    High Sensitivity Troponin T     Comprehensive Metabolic Panel    Lipase    BNP    CBC Auto Differential    NPO Diet NPO Type: Strict NPO    Undress & Gown    Continuous Pulse Oximetry    Oxygen Therapy- Nasal Cannula; Titrate 1-6 LPM Per SpO2; 90 - 95%    ECG 12 Lead ED Triage Standing Order; Chest Pain    ECG 12 Lead ED Triage Standing Order; Chest Pain    Insert Peripheral IV    CBC & Differential    Green Top (Gel)    Lavender Top    Gold Top - SST    Gray Top    Light Blue Top       All labs have been independently reviewed by me.  All radiology studies have been reviewed by me and the radiologist dictating the report.  All EKG's have been independently viewed and interpreted by me.      Discussion below represents my analysis of pertinent findings related to patient's condition, differential diagnosis, treatment plan and final disposition.    Differential diagnosis:  The differential diagnosis associated with the patient's presentation includes: Angina, coronary disease, pneumonia, paroxysmal A-fib    Additional sources  Discussed/ obtained information from independent historians:   [] Spouse  [] Parent  [] Family member  [] Friend  [x] EMS   [] Other:    External (non-ED) record review:   [x] Inpatient record:   [] Office record:   [] Outpatient record:   [] Prior Outpatient labs:   [] Prior Outpatient radiology:   [] Primary Care record:   [] Outside ED record:   [] Other:     Patient's care impacted by:   [] Diabetes  [x] Hypertension  [] CHF  [] Hyperlipidemia  [x] Coronary Artery Disease   [] COPD   [] Cancer   [x] Tobacco Abuse   [] Substance Abuse    [] Other:     Care significantly affected by Social Determinants of Health (housing and economic circumstances, unemployment)    [] Yes     [x] No   If yes, Patient's care significantly limited by Social Determinants of Health including:   [] Inadequate housing   [] Low income   [] Alcoholism and drug addiction in family   [] Problems related to primary support group   [] Unemployment   []  Problems related to employment   [] Other Social Determinants of Health:       MEDICATIONS ADMINISTERED IN ED:  Medications   sodium chloride 0.9 % flush 10 mL (has no administration in time range)   aspirin chewable tablet 324 mg (324 mg Oral Not Given 8/11/23 1822)   nitroglycerin (TRIDIL) 200 mcg/ml infusion (has no administration in time range)       ED Course as of 08/11/23 1919   Fri Aug 11, 2023   1916 HEART Pathway for Early Discharge in Acute Chest Pain from [employer]  on 8/11/2023  ** All calculations should be rechecked by clinician prior to use **    RESULT SUMMARY:  7 points  HEART Pathway Score    High risk  12-65% 30-day MACE    Cardiology consultation and admission recommended. Further testing indicated.      INPUTS:  History -> 1 = Moderately suspicious  EKG -> 1 = Non-specific repolarization disturbance  Age -> 2 = =63  Risk factors -> 2 = =3 risk factors or history of atherosclerotic disease  Initial troponin -> 1 = 1-3x normal limit   [AP]      ED Course User Index  [AP] Sanchez Crespo,          This is a 73-year-old male with a history of coronary disease who presents for recurrent chest pain, anginal symptoms.  Seen her earlier this morning extended admission but he refused and left AGAINST MEDICAL ADVICE, returns this evening with recurrent chest pain.  Symptoms improved upon arrival to the emergency department, no acute ischemic changes on his EKG, his vital signs are stable.  He has a significant cardiac history.  We discussed moving forward with IV, labs and imaging for further assessment.  Patient blood work is stable, his high-sensitivity troponin is 38, down from 50 earlier today.  Still with his cardiac history, heart score 7, recurrent chest pain with known coronary disease patient was started on nitro infusion, we will plan for admission in the hospital for further work-up, case discussed with hospitalist, Dr. Palencia, for admission.  Patient is agreeable to this  plan.    PROCEDURES:  Procedures    CRITICAL CARE TIME    Total Critical Care time was 0 minutes, excluding separately reportable procedures.   There was a high probability of clinically significant/life threatening deterioration in the patient's condition which required my urgent intervention.      FINAL IMPRESSION      1. Chest pain, unspecified type    2. Coronary artery disease involving native heart with other form of angina pectoris, unspecified vessel or lesion type          DISPOSITION/PLAN     ED Disposition       ED Disposition   Decision to Admit    Condition   --    Comment   --             Comment: Please note this report has been produced using speech recognition software.      Sanchez Crespo DO  Attending Emergency Physician         Sanchez Crespo DO  08/11/23 1919      Electronically signed by Sanchez Crespo DO at 08/11/23 1919       Vital Signs (last 7 days)       Date/Time Temp Temp src Pulse Resp BP Patient Position SpO2    08/15/23 0702 97.4 (36.3) Oral -- -- -- -- --    08/15/23 0658 -- -- 68 18 115/60 Lying --    08/15/23 0358 98 (36.7) Oral 72 18 120/73 Lying 99    08/14/23 2019 97.4 (36.3) Oral 77 18 126/69 Lying --    08/14/23 1910 -- -- 75 18 -- Sitting 95    08/14/23 1731 -- -- 84 -- 143/76 -- 95    08/14/23 1630 -- -- 75 -- 130/66 -- 94    08/14/23 1615 -- -- 76 -- 117/66 -- 96    08/14/23 1600 -- -- 74 -- 133/75 -- 95    08/14/23 1545 -- -- 77 -- 142/79 -- 96    08/14/23 1530 -- -- 78 -- 149/78 -- 91    08/14/23 1518 -- -- 79 18 -- -- 97    08/14/23 1515 -- -- 71 -- 114/67 -- 96    08/14/23 1500 98.3 (36.8) Oral 73 18 122/71 Lying 97    08/14/23 1445 -- -- 74 -- 117/70 -- 98    08/14/23 1430 -- -- 74 -- 109/69 -- 98    08/14/23 1415 -- -- 71 -- 120/70 -- 96    08/14/23 1400 -- -- 73 -- 118/68 -- 94    08/14/23 1345 -- -- 75 -- 121/68 -- 95    08/14/23 1330 -- -- 79 -- 128/70 -- 95    08/14/23 1315 -- -- 82 -- 138/75 -- 96    08/14/23 1300 -- -- 82 -- 136/72 -- 94     08/14/23 1245 -- -- 82 -- 128/68 -- 93    08/14/23 1230 -- -- 79 -- 135/76 -- 94    08/14/23 1215 -- -- 83 -- 149/83 -- 95    08/14/23 1200 -- -- 81 -- 132/76 -- 96    08/14/23 1145 -- -- 72 -- 135/77 -- 94    08/14/23 1130 -- -- 72 -- 129/73 -- 94    08/14/23 1115 -- -- 71 18 120/66 -- 94    08/14/23 1106 -- -- 74 -- 127/75 -- 96    08/14/23 1058 -- -- 70 -- 107/62 -- --    08/14/23 1045 -- -- 72 -- 102/62 -- --    08/14/23 1030 -- -- 72 -- 119/68 -- --    08/14/23 1025 -- -- 73 -- 120/62 -- --    08/14/23 1020 -- -- 76 -- 119/71 -- 93    08/14/23 10:05:57 -- -- 76 20 130/67 -- 93    08/14/23 09:38:40 -- -- 80 20 165/83 -- 92    08/14/23 0741 -- -- 73 -- 128/72 -- 96    08/14/23 0713 -- -- 74 18 -- -- 96    08/14/23 0414 98.2 (36.8) Oral 81 18 141/77 Lying 94    08/14/23 0312 -- -- 77 18 -- -- 96    08/14/23 0004 98.2 (36.8) Oral 77 18 116/64 Lying 98    08/13/23 2110 -- -- 79 18 -- -- 95    08/13/23 1930 98.2 (36.8) Oral 74 18 133/87 Lying 94    08/13/23 1608 -- -- 76 20 -- -- 96    08/13/23 1550 97.7 (36.5) Oral 75 20 132/69 Lying 95    08/13/23 1400 -- -- 78 -- -- -- 93    08/13/23 1259 -- -- 80 -- 111/60 -- --    08/13/23 1248 -- -- 83 20 -- -- 91    08/13/23 1124 -- -- 77 -- 108/60 -- --    08/13/23 1033 97.9 (36.6) Oral 73 18 118/69 Lying 94    08/13/23 0900 -- -- 76 -- 119/78 -- 94    08/13/23 0855 -- -- 78 18 -- -- 92    08/13/23 0812 -- -- 77 -- 117/71 -- --    08/13/23 0648 97.6 (36.4) Oral 73 18 102/69 -- 93    08/13/23 0530 -- -- 69 -- 107/69 -- 91    08/13/23 0500 -- -- 70 -- 110/59 -- 92    08/13/23 0430 -- -- 72 -- 108/62 -- 92    08/13/23 0400 -- -- 70 -- 98/57 -- 95    08/13/23 0330 97.7 (36.5) Oral 64 18 95/51 Lying 96    08/13/23 0300 -- -- 66 -- 92/56 -- 97    08/13/23 0230 -- -- 72 -- 117/102 -- 89    08/13/23 0200 -- -- 66 -- 97/48 -- 91    08/13/23 0030 -- -- 68 -- 104/63 -- 92    08/13/23 0000 -- -- 71 -- 144/76 -- 97    08/12/23 2330 -- -- 69 -- 106/59 -- 97    08/12/23 2300 -- -- 67 --  112/59 -- 96    08/12/23 2230 -- -- 71 -- 97/62 -- 96    08/12/23 2200 -- -- 73 -- 106/62 -- 94    08/12/23 2130 -- -- 75 -- 98/47 -- 95    08/12/23 2117 -- -- 72 -- 92/40 -- --    08/12/23 2100 -- -- 74 -- 87/47 -- 97    08/12/23 2030 -- -- 75 -- 98/47 -- 95    08/12/23 2000 -- -- 74 -- 110/60 -- 98    08/12/23 1954 -- -- 79 18 -- -- 95    08/12/23 1900 -- -- 77 -- 97/57 -- 97    08/12/23 1830 -- -- 80 -- 105/58 -- 97    08/12/23 1812 -- -- 79 -- 96/64 -- --    08/12/23 1800 -- -- 82 -- 97/65 -- 91    08/12/23 1730 -- -- 86 -- 105/57 -- 94    08/12/23 1700 -- -- 79 -- 105/63 -- 94    08/12/23 1630 -- -- 80 -- 126/72 -- 95    08/12/23 1625 -- -- 77 -- 120/72 -- 98    08/12/23 1618 -- -- 79 20 124/71 -- 94    08/12/23 1617 -- -- 79 -- 133/72 -- --    08/12/23 1615 -- -- 77 -- 133/72 -- 94    08/12/23 1610 -- -- 91 -- 141/79 -- 92    08/12/23 1605 -- -- 79 -- 136/77 -- 95    08/12/23 1601 97.7 (36.5) Oral 78 22 139/75 Lying 96    08/12/23 1557 -- -- 77 -- 159/85 -- --    08/12/23 1555 -- -- 80 -- 159/85 -- 96    08/12/23 1551 -- -- 79 -- 145/80 -- --    08/12/23 1540 -- -- 79 -- 149/79 -- --    08/12/23 1536 -- -- 78 -- 154/86 -- --    08/12/23 1531 -- -- 81 -- 171/88 -- --    08/12/23 1527 -- -- 80 -- 162/87 -- --    08/12/23 1525 -- -- 80 -- 159/86 -- --    08/12/23 1520 -- -- 85 -- 179/97 -- --    08/12/23 1515 -- -- 81 -- 175/97 -- 95    08/12/23 1511 -- -- -- -- 166/90 -- --    08/12/23 1510 -- -- 82 -- 169/93 -- 94    08/12/23 1505 -- -- 79 -- -- -- 95    08/12/23 1502 -- -- 83 -- 160/85 -- --    08/12/23 1500 -- -- 80 -- 159/89 -- 93    08/12/23 1455 -- -- 81 -- -- -- 95    08/12/23 1452 -- -- 81 -- 158/85 -- --    08/12/23 1450 -- -- 82 -- -- -- 94    08/12/23 1400 -- -- 79 -- 112/100 -- 92    08/12/23 1330 -- -- 75 -- 101/71 -- 100    08/12/23 1325 -- -- -- 18 -- -- --    08/12/23 1300 -- -- 78 -- 113/67 -- 95    08/12/23 1230 -- -- 80 -- 114/68 -- 93    08/12/23 1200 -- -- 77 -- 130/79 -- 92    08/12/23 1130  -- -- 72 -- 116/66 -- 94    08/12/23 1100 -- -- 74 -- 141/81 -- 93    08/12/23 1040 97.8 (36.6) Oral 72 18 136/78 Sitting 94    08/12/23 1000 -- -- 72 -- 134/75 -- 93    08/12/23 0930 -- -- 81 -- 148/83 -- 95    08/12/23 0900 -- -- 77 -- 140/75 -- 94    08/12/23 0844 -- -- -- -- -- -- 93    08/12/23 0832 -- -- 79 -- 134/78 -- --    08/12/23 0830 -- -- 87 -- 134/78 -- 93    08/12/23 0800 -- -- 76 -- 115/64 -- 95    08/12/23 0758 -- -- 84 16 -- -- 94    08/12/23 0730 -- -- 78 -- 126/74 -- 93    08/12/23 0700 -- -- 78 -- 126/71 -- 94    08/12/23 0630 98.3 (36.8) Oral 76 16 111/58 Lying 94    08/12/23 0600 -- -- 79 -- 121/67 -- 94    08/12/23 0530 -- -- 71 -- 116/62 -- 94    08/12/23 0500 -- -- 80 -- 116/65 -- 94    08/12/23 0430 -- -- 69 -- 111/54 -- 95    08/12/23 0400 -- -- 74 -- 109/53 -- 94    08/12/23 0330 -- -- 81 -- 122/62 -- 95    08/12/23 0300 -- -- 75 -- 102/57 -- 93    08/12/23 0230 -- -- 78 -- 117/64 -- 93    08/12/23 0200 -- -- 84 -- 141/70 -- 95    08/12/23 0130 -- -- 79 -- 121/52 -- 98    08/12/23 0100 -- -- 79 -- 131/63 -- 98    08/12/23 0030 -- -- 75 -- 116/57 -- 98    08/12/23 0000 -- -- 78 -- 107/68 -- 95    08/11/23 2330 -- -- 76 -- 108/68 -- 94    08/11/23 2300 98 (36.7) Oral 74 18 121/63 Lying --    08/11/23 2200 -- -- 77 -- 141/79 -- 95    08/11/23 2132 97.6 (36.4) Oral 79 20 142/82 Lying 95    08/11/23 2100 -- -- 79 -- 106/69 -- 96    08/11/23 2045 -- -- 86 -- 129/72 -- 95    08/11/23 2030 -- -- 85 -- 120/70 -- 96    08/11/23 2015 -- -- 84 -- 133/82 -- 96    08/11/23 2000 -- -- 83 -- 128/71 -- 97    08/11/23 1945 -- -- 79 -- 128/71 -- 96    08/11/23 1936 -- -- 79 -- 128/67 -- 98    08/11/23 1930 -- -- 79 -- 128/67 -- 97    08/11/23 1915 -- -- 81 -- 123/66 -- 96    08/11/23 1900 -- -- 80 -- 123/65 -- 96    08/11/23 1825 98.1 (36.7) -- -- -- 117/65 Lying --    08/11/23 1819 -- -- 88 21 -- -- 96          Oxygen Therapy (last 7 days)       Date/Time SpO2 Device (Oxygen Therapy) Flow (L/min) Oxygen  Concentration (%) ETCO2 (mmHg)    08/15/23 0610 -- nasal cannula 2 -- --    08/15/23 0410 -- nasal cannula 2 -- --    08/15/23 0358 99 -- -- -- --    08/15/23 0210 -- nasal cannula 2 -- --    08/15/23 0010 -- nasal cannula 2 -- --    08/14/23 2210 -- nasal cannula 2 -- --    08/14/23 2010 -- nasal cannula 2 -- --    08/14/23 1910 95 room air -- -- --    08/14/23 1731 95 room air -- -- --    08/14/23 1630 94 -- -- -- --    08/14/23 1615 96 -- -- -- --    08/14/23 1600 95 -- -- -- --    08/14/23 1545 96 -- -- -- --    08/14/23 1530 91 -- -- -- --    08/14/23 1518 97 nasal cannula 2 -- --    08/14/23 1515 96 -- -- -- --    08/14/23 1500 97 nasal cannula 2 -- --    08/14/23 1445 98 -- -- -- --    08/14/23 1430 98 -- -- -- --    08/14/23 1415 96 -- -- -- --    08/14/23 1400 94 room air -- -- --    08/14/23 1345 95 -- -- -- --    08/14/23 1330 95 -- -- -- --    08/14/23 1315 96 -- -- -- --    08/14/23 1300 94 -- -- -- --    08/14/23 1245 93 -- -- -- --    08/14/23 1230 94 -- -- -- --    08/14/23 1215 95 -- -- -- --    08/14/23 1200 96 -- -- -- --    08/14/23 1145 94 -- -- -- --    08/14/23 1130 94 -- -- -- --    08/14/23 1115 94 room air -- -- --    08/14/23 1106 96 room air -- -- --    08/14/23 1020 93 room air -- -- --    08/14/23 10:05:57 93 -- -- -- --    08/14/23 09:38:40 92 -- -- -- --    08/14/23 0800 -- room air -- -- --    08/14/23 0741 96 room air -- -- --    08/14/23 0713 96 room air -- -- --    08/14/23 0603 -- nasal cannula 2 -- --    08/14/23 0414 94 -- -- -- --    08/14/23 0402 -- nasal cannula 2 -- --    08/14/23 0312 96 nasal cannula 2 -- --    08/14/23 0216 -- nasal cannula 2 -- --    08/14/23 0004 98 -- -- -- --    08/14/23 0003 -- nasal cannula 2 -- --    08/13/23 2206 -- nasal cannula 2 -- --    08/13/23 2110 95 nasal cannula 2 -- --    08/13/23 2048 -- nasal cannula 2 -- --    08/13/23 1930 94 -- -- -- --    08/13/23 1800 -- room air -- -- --    08/13/23 1608 96 nasal cannula 2 -- --    08/13/23 1550  95 room air -- -- --    08/13/23 1400 93 room air -- -- --    08/13/23 1248 91 room air -- -- --    08/13/23 1033 94 room air -- -- --    08/13/23 1025 -- room air -- -- --    08/13/23 0900 94 -- -- -- --    08/13/23 0855 92 room air -- -- --    08/13/23 0812 -- room air -- -- --    08/13/23 0648 93 nasal cannula 2 -- --    08/13/23 0625 -- nasal cannula 2 -- --    08/13/23 0530 91 -- -- -- --    08/13/23 0500 92 -- -- -- --    08/13/23 0430 92 -- -- -- --    08/13/23 0410 -- nasal cannula 2 -- --    08/13/23 0400 95 -- -- -- --    08/13/23 0330 96 -- -- -- --    08/13/23 0300 97 -- -- -- --    08/13/23 0234 -- nasal cannula 2 -- --    08/13/23 0230 89 -- -- -- --    08/13/23 0200 91 -- -- -- --    08/13/23 0030 92 -- -- -- --    08/13/23 0026 -- nasal cannula 2 -- --    08/13/23 0000 97 -- -- -- --    08/12/23 2330 97 -- -- -- --    08/12/23 2300 96 -- -- -- --    08/12/23 2230 96 -- -- -- --    08/12/23 2210 -- nasal cannula 2 -- --    08/12/23 2200 94 -- -- -- --    08/12/23 2130 95 -- -- -- --    08/12/23 2100 97 -- -- -- --    08/12/23 2040 -- nasal cannula 2 -- --    08/12/23 2030 95 -- -- -- --    08/12/23 2000 98 -- -- -- --    08/12/23 1954 95 nasal cannula 2 -- --    08/12/23 1900 97 -- -- -- --    08/12/23 1830 97 -- -- -- --    08/12/23 1800 91 nasal cannula 2 -- --    08/12/23 1730 94 -- -- -- --    08/12/23 1700 94 -- -- -- --    08/12/23 1630 95 -- -- -- --    08/12/23 1625 98 -- -- -- --    08/12/23 1618 94 nasal cannula 2 -- --    08/12/23 1615 94 -- -- -- --    08/12/23 1610 92 -- -- -- --    08/12/23 1605 95 -- -- -- --    08/12/23 1601 96 nasal cannula 2 -- --    08/12/23 1600 -- nasal cannula 2 -- --    08/12/23 1555 96 -- -- -- --    08/12/23 1515 95 -- -- -- --    08/12/23 1510 94 nasal cannula 2 -- --    08/12/23 1505 95 -- -- -- --    08/12/23 1500 93 -- -- -- --    08/12/23 1455 95 -- -- -- --    08/12/23 1450 94 -- -- -- --    08/12/23 1400 92 room air -- -- --    08/12/23 1330 100 -- --  -- --    08/12/23 1325 -- room air -- -- --    08/12/23 1300 95 -- -- -- --    08/12/23 1230 93 -- -- -- --    08/12/23 1200 92 room air -- -- --    08/12/23 1130 94 -- -- -- --    08/12/23 1100 93 -- -- -- --    08/12/23 1040 94 room air -- -- --    08/12/23 1000 93 room air -- -- --    08/12/23 0930 95 -- -- -- --    08/12/23 0900 94 -- -- -- --    08/12/23 0844 93 room air -- -- --    08/12/23 0830 93 -- -- -- --    08/12/23 0800 95 room air -- -- --    08/12/23 0758 94 room air -- -- --    08/12/23 0730 93 -- -- -- --    08/12/23 0700 94 -- -- -- --    08/12/23 0630 94 nasal cannula 2 -- --    08/12/23 0600 94 nasal cannula 2 -- --    08/12/23 0530 94 -- -- -- --    08/12/23 0500 94 -- -- -- --    08/12/23 0430 95 -- -- -- --    08/12/23 0413 -- nasal cannula 2 -- --    08/12/23 0400 94 -- -- -- --    08/12/23 0330 95 -- -- -- --    08/12/23 0300 93 -- -- -- --    08/12/23 0230 93 -- -- -- --    08/12/23 0200 95 nasal cannula 2 -- --    08/12/23 0130 98 -- -- -- --    08/12/23 0100 98 -- -- -- --    08/12/23 0030 98 -- -- -- --    08/12/23 0010 -- nasal cannula 2 -- --    08/12/23 0000 95 -- -- -- --    08/11/23 2330 94 -- -- -- --    08/11/23 2200 95 -- -- -- --    08/11/23 2155 -- nasal cannula 2 -- --    08/11/23 2132 95 nasal cannula 3 -- --    08/11/23 2100 96 -- -- -- --    08/11/23 2045 95 -- -- -- --    08/11/23 2030 96 -- -- -- --    08/11/23 2015 96 -- -- -- --    08/11/23 2000 97 -- -- -- --    08/11/23 1945 96 -- -- -- --    08/11/23 1936 98 -- -- -- --    08/11/23 1930 97 -- -- -- --    08/11/23 1915 96 -- -- -- --    08/11/23 1900 96 -- -- -- --    08/11/23 1819 96 nasal cannula 3 -- --          Lines, Drains & Airways       Active LDAs       Name Placement date Placement time Site Days    Peripheral IV 08/13/23 0020 Anterior;Left;Proximal Forearm 08/13/23  0020  Forearm  2                  Current Facility-Administered Medications   Medication Dose Route Frequency Provider Last Rate Last Admin     acetaminophen (TYLENOL) tablet 650 mg  650 mg Oral Q4H PRN Hua Han MD        aluminum-magnesium hydroxide-simethicone (MAALOX MAX) 400-400-40 MG/5ML suspension 15 mL  15 mL Oral Q6H PRN Hua Han MD   15 mL at 08/12/23 0152    amLODIPine (NORVASC) tablet 5 mg  5 mg Oral Daily Hua Han MD   5 mg at 08/14/23 0808    aspirin EC tablet 81 mg  81 mg Oral Daily Hua Han MD   81 mg at 08/14/23 0808    sennosides-docusate (PERICOLACE) 8.6-50 MG per tablet 2 tablet  2 tablet Oral BID Hua Han MD   2 tablet at 08/14/23 2035    And    polyethylene glycol (MIRALAX) packet 17 g  17 g Oral Daily PRN Hua Han MD        And    bisacodyl (DULCOLAX) EC tablet 5 mg  5 mg Oral Daily PRN Hua Han MD        And    bisacodyl (DULCOLAX) suppository 10 mg  10 mg Rectal Daily PRN Hua Han MD        Calcium Replacement - Follow Nurse / BPA Driven Protocol   Does not apply PRN Hua Han MD        dextrose (D50W) (25 g/50 mL) IV injection 25 g  25 g Intravenous Q15 Min PRN Hua Han MD        dextrose (GLUTOSE) oral gel 15 g  15 g Oral Q15 Min PRN Hua Han MD        glucagon (GLUCAGEN) injection 1 mg  1 mg Intramuscular Q15 Min PRN Hua Han MD        HYDROcodone-acetaminophen (NORCO) 7.5-325 MG per tablet 1 tablet  1 tablet Oral Q6H PRN Hua Han MD   1 tablet at 08/15/23 0123    Insulin Lispro (humaLOG) injection 2-9 Units  2-9 Units Subcutaneous 4x Daily AC & at Bedtime Hua Hna MD        ipratropium-albuterol (DUO-NEB) nebulizer solution 3 mL  3 mL Nebulization 4x Daily - RT Hua Han MD   3 mL at 08/14/23 1909    ipratropium-albuterol (DUO-NEB) nebulizer solution 3 mL  3 mL Nebulization Q4H PRN Hua Han MD        lisinopril (PRINIVIL,ZESTRIL) tablet 5 mg  5 mg Oral Daily Hua Han MD   5 mg at 08/14/23 0809    LORazepam (ATIVAN) tablet 2 mg  2 mg Oral Q6H PRN Hua Han MD        Magnesium Standard Dose  Replacement - Follow Nurse / BPA Driven Protocol   Does not apply PRN Hua Han MD        melatonin tablet 5 mg  5 mg Oral Nightly Olive Rosario APRN   5 mg at 08/14/23 2034    naloxone (NARCAN) injection 0.4 mg  0.4 mg Intravenous Q5 Min PRN Hua Han MD        nicotine (NICODERM CQ) 21 MG/24HR patch 1 patch  1 patch Transdermal Q24H Hua Han MD   1 patch at 08/13/23 2201    nitroglycerin (NITROSTAT) SL tablet 0.4 mg  0.4 mg Sublingual Q5 Min PRN Hua Han MD        ondansetron (ZOFRAN) injection 4 mg  4 mg Intravenous Q6H PRN Hua Han MD        pantoprazole (PROTONIX) EC tablet 40 mg  40 mg Oral Q AM Hua Han MD   40 mg at 08/14/23 0750    Pharmacy Consult - MTM   Does not apply Daily Hua Han MD        Phosphorus Replacement - Follow Nurse / BPA Driven Protocol   Does not apply PRN Hua Han MD        Potassium Replacement - Follow Nurse / BPA Driven Protocol   Does not apply PRN Hua Han MD        rosuvastatin (CRESTOR) tablet 40 mg  40 mg Oral Nightly Hua Han MD   40 mg at 08/13/23 0812    sodium chloride 0.9 % flush 10 mL  10 mL Intravenous Q12H Hau Han MD   10 mL at 08/13/23 2049    sodium chloride 0.9 % flush 10 mL  10 mL Intravenous PRN Hua Han MD        sodium chloride 0.9 % infusion 40 mL  40 mL Intravenous PRN Hua Han MD        sotalol (BETAPACE) tablet 80 mg  80 mg Oral Q12H Hua Han MD   80 mg at 08/14/23 2034    sucralfate (CARAFATE) tablet 1 g  1 g Oral TID AC Hua Han MD   1 g at 08/14/23 1746    tamsulosin (FLOMAX) 24 hr capsule 0.4 mg  0.4 mg Oral Daily Hua Han MD   0.4 mg at 08/14/23 0808     Operative/Procedure Notes (last 7 days)  Notes from 08/08/23 0807 through 08/15/23 0807   No notes of this type exist for this encounter.          Physician Progress Notes (last 7 days)        Kain Loza PA at 08/15/23 0716            CTS Progress Note      Chief  Complaint: Coronary artery disease      Subjective  In bed.  Conversant.  Pleasant.  States he is talked with his cardiologist and his decided to pursue coronary stents versus surgery.  States he had some chest pain last night but is currently pain-free      Objective    Physical Exam:   Vital Signs   Temp:  [97.4 øF (36.3 øC)-98.3 øF (36.8 øC)] 97.4 øF (36.3 øC)  Heart Rate:  [68-84] 68  Resp:  [18-20] 18  BP: (102-165)/(60-83) 115/60   GEN: NAD   CV: Regular rate and rhythm, distant no murmur rub or gallop.    RESP: Clear to auscultation bilaterally, distant.  Unlabored on room air   ABD: Soft nontender positive bowel sound    EXT: Warm to the touch no significant edema   Neuro: Light and oriented x3       Results     Results from last 7 days   Lab Units 08/14/23  0246   WBC 10*3/mm3 7.02   HEMOGLOBIN g/dL 11.3*   HEMATOCRIT % 33.8*   PLATELETS 10*3/mm3 174     Results from last 7 days   Lab Units 08/14/23  0246   SODIUM mmol/L 139   POTASSIUM mmol/L 4.2   CHLORIDE mmol/L 106   CO2 mmol/L 22.0   BUN mg/dL 15   CREATININE mg/dL 0.76   GLUCOSE mg/dL 99   CALCIUM mg/dL 8.2*     Results from last 7 days   Lab Units 08/12/23  1016   INR  1.00   APTT seconds 30.1*           Assessment & Plan   Severe coronary disease with left main disease, ejection fraction 55%      NSTEMI (non-ST elevated myocardial infarction)    Atrial fibrillation    Coronary artery disease    Hypertension    Hypercholesterolemia    Tobacco abuse    COPD (chronic obstructive pulmonary disease)    Type 2 diabetes mellitus with hyperglycemia, without long-term current use of insulin        Plan   Patient is currently stating that he has talked with his cardiologist and will undergo coronary stenting today.  CT surgery will sign off.  Please call if needed.  Discussed with the patient and told him that if he wanted further discussion about surgical intervention or changes his mind and wishes to pursue surgical intervention to chest let us  "know.    JORDYN Eller  08/15/23  07:16 EDT                    Electronically signed by Kain Loza PA at 08/15/23 0803       Sai Mukherjee III, MD at 08/14/23 1733          [facility] Progress Note     LOS: 3 days   Patient Care Team:  Tigre Cates MD as PCP - General (Family Medicine)  Sai Mukherjee III, MD as Cardiologist (Cardiology)  PCP:  Tigre Cates MD    Chief Complaint: NSTEMI    SUBJECTIVE: Currently chest pain-free.  Mainly complaining of sleep deprivation due to routine vitals at night and lab draws.      Review of Systems:   All systems have been reviewed and are negative with the exception of those mentioned above.      OBJECTIVE:    Vital Sign Min/Max for last 24 hours  Temp  Min: 98.2 øF (36.8 øC)  Max: 98.3 øF (36.8 øC)   BP  Min: 102/62  Max: 165/83   Pulse  Min: 70  Max: 84   Resp  Min: 18  Max: 20   SpO2  Min: 91 %  Max: 98 %   No data recorded   No data recorded     Flowsheet Rows      Flowsheet Row First Filed Value   Admission Height 177.8 cm (70\") Documented at 08/11/2023 1819   Admission Weight 86.6 kg (191 lb) Documented at 08/11/2023 1819                Intake/Output Summary (Last 24 hours) at 8/14/2023 1733  Last data filed at 8/14/2023 1335  Gross per 24 hour   Intake 360 ml   Output 475 ml   Net -115 ml     Intake & Output (last 3 days)         08/11 0701 08/12 0700 08/12 0701 08/13 0700 08/13 0701  08/14 0700 08/14 0701  08/15 0700    P.O.  840 360 360    Total Intake(mL/kg)  840 (9.8) 360 (4.2) 360 (4.2)    Urine (mL/kg/hr)  650 (0.3) 500 (0.2) 475 (0.5)    Stool  0 0     Total Output  650 500 475    Net  +190 -140 -115            Urine Unmeasured Occurrence  7 x 5 x 350 x    Stool Unmeasured Occurrence  1 x 1 x              Physical Exam:    General Appearance:    Alert, cooperative, no distress, appears stated age   Neck:   Supple, symmetrical, trachea midline.   Lungs:     Clear to auscultation bilaterally, " respirations unlabored   Chest Wall:    No tenderness or deformity    Heart:    Regular rate and rhythm, S1 and S2 normal, no murmur, rub   or gallop, normal carotid impulse bilaterally without bruit.   Extremities:   Extremities normal, atraumatic, no cyanosis or edema   Pulses:   2+ and symmetric all extremities   Skin:   Skin color, texture, turgor normal, no rashes or lesions      LABS/DIAGNOSTIC DATA:  Results from last 7 days   Lab Units 08/14/23  0246 08/13/23  0429 08/12/23  1504   WBC 10*3/mm3 7.02 8.42 9.98   HEMOGLOBIN g/dL 11.3* 10.8* 12.1*   HEMATOCRIT % 33.8* 32.9* 36.9*   PLATELETS 10*3/mm3 174 175 193     Lab Results   Lab Value Date/Time    TROPONINT 41 (H) 08/12/2023 1642    TROPONINT 36 (H) 08/12/2023 1500    TROPONINT 38 (H) 08/11/2023 1822    TROPONINT 50 (H) 08/11/2023 0459    TROPONINT 22 (H) 08/11/2023 0235    TROPONINT <0.010 11/16/2021 1600    TROPONINT <0.010 11/16/2021 1404     Results from last 7 days   Lab Units 08/12/23  1016   INR  1.00   APTT seconds 30.1*     Results from last 7 days   Lab Units 08/14/23  0246 08/13/23  0429 08/11/23  1822 08/11/23  0235   SODIUM mmol/L 139 139 141 143   POTASSIUM mmol/L 4.2 4.1 3.7 3.6   CHLORIDE mmol/L 106 107 106 106   CO2 mmol/L 22.0 22.0 23.0 24.0   BUN mg/dL 15 19 19 18   CREATININE mg/dL 0.76 0.85 0.74* 0.90   CALCIUM mg/dL 8.2* 8.2* 8.6 9.2   BILIRUBIN mg/dL  --   --  0.3 0.4   ALK PHOS U/L  --   --  81 87   ALT (SGPT) U/L  --   --  15 18   AST (SGOT) U/L  --   --  15 19   GLUCOSE mg/dL 99 97 190* 234*     Results from last 7 days   Lab Units 08/11/23  0235   HEMOGLOBIN A1C % 6.90*     Results from last 7 days   Lab Units 08/11/23  0235   CHOLESTEROL mg/dL 98   TRIGLYCERIDES mg/dL 222*   HDL CHOL mg/dL 29*   LDL CHOL mg/dL 34     Results from last 7 days   Lab Units 08/11/23  0235   TSH uIU/mL 2.420           Medication Review:   amLODIPine, 5 mg, Oral, Daily  aspirin, 81 mg, Oral, Daily  insulin lispro, 2-9 Units, Subcutaneous, 4x Daily AC  & at Bedtime  ipratropium-albuterol, 3 mL, Nebulization, 4x Daily - RT  lisinopril, 5 mg, Oral, Daily  nicotine, 1 patch, Transdermal, Q24H  pantoprazole, 40 mg, Oral, Q AM  pharmacy consult - MTM, , Does not apply, Daily  rosuvastatin, 40 mg, Oral, Nightly  senna-docusate sodium, 2 tablet, Oral, BID  sodium chloride, 10 mL, Intravenous, Q12H  sotalol, 80 mg, Oral, Q12H  sucralfate, 1 g, Oral, TID AC  tamsulosin, 0.4 mg, Oral, Daily             ASSESSMENT/PLAN:    NSTEMI (non-ST elevated myocardial infarction)    Atrial fibrillation    Coronary artery disease    Hypertension    Hypercholesterolemia    Tobacco abuse    COPD (chronic obstructive pulmonary disease)    Type 2 diabetes mellitus with hyperglycemia, without long-term current use of insulin        NSTEMI in the setting of multivessel disease.  Patient does not desire surgical revascularization.  Recommending percutaneous revascularization of culprit lesion in the RCA followed by optimization of medical therapy and close clinical follow-up.          Sai Mukherjee III, MD   23  17:33 EDT    Electronically signed by Sai Mukherjee III, MD at 23 1734       Radha Mooney MD at 23 1453              [facility] Medicine Services  PROGRESS NOTE    Patient Name: Scott Milan  : 1950  MRN: [id number]    Date of Admission: 2023  Primary Care Physician: Tigre Cates MD    Subjective   Subjective     CC:  CP    HPI:  No events overnight, denies any chest pain or left arm pain.  Has been n.p.o. and awaiting left heart cath.    ROS:  Gen- No fevers, chills  CV- No chest pain, palpitations  Resp- No cough, dyspnea  GI- No N/V/D, abd pain       Objective   Objective     Vital Signs:   Temp:  [97.7 øF (36.5 øC)-98.2 øF (36.8 øC)] 98.2 øF (36.8 øC)  Heart Rate:  [70-83] 71  Resp:  [18-20] 18  BP: (102-165)/(62-87) 120/70  Flow (L/min):  [2] 2     Physical Exam:  Constitutional: No acute distress, awake,  alert  HENT: NCAT, mucous membranes moist  Respiratory: Clear to auscultation bilaterally, respiratory effort normal   Cardiovascular: RRR, no murmurs, rubs, or gallops  Gastrointestinal: Positive bowel sounds, soft, nontender, nondistended  Musculoskeletal: No bilateral ankle edema  Psychiatric: Appropriate affect, cooperative  Neurologic: Oriented x 3, no focal neuro deficits  Skin: No rashes      Results Reviewed:  LAB RESULTS:      Lab 08/14/23  0246 08/13/23  1937 08/13/23  1207 08/13/23  0429 08/12/23  1642 08/12/23  1504 08/12/23  1016 08/12/23  1016 08/11/23  1822 08/11/23  0235   WBC 7.02  --   --  8.42  --  9.98  --  8.88 7.93 9.06   HEMOGLOBIN 11.3*  --   --  10.8*  --  12.1*  --  12.6* 12.4* 13.3   HEMATOCRIT 33.8*  --   --  32.9*  --  36.9*  --  38.9 37.6 40.5   PLATELETS 174  --   --  175  --  193  --  205 194 237   NEUTROS ABS 3.99  --   --  4.82  --   --   --  5.70 5.18 5.76   IMMATURE GRANS (ABS) 0.03  --   --  0.03  --   --   --  0.03 0.01 0.04   LYMPHS ABS 1.93  --   --  2.25  --   --   --  1.87 1.55 1.84   MONOS ABS 0.75  --   --  0.85  --   --   --  0.81 0.76 0.92*   EOS ABS 0.30  --   --  0.43*  --   --   --  0.43* 0.39 0.46*   MCV 89.2  --   --  90.6  --  91.1  --  91.1 92.2 89.6   PROTIME  --   --   --   --   --   --   --  13.3  --   --    APTT  --   --   --   --   --   --   --  30.1*  --   --    HEPARIN ANTI-XA 0.69 0.91* 0.54 0.23* 0.15*  --    < > 0.10*  --   --    D DIMER QUANT  --   --   --   --   --   --   --   --   --  0.38    < > = values in this interval not displayed.         Lab 08/14/23  0246 08/13/23  0429 08/11/23  1822 08/11/23  0235   SODIUM 139 139 141 143   POTASSIUM 4.2 4.1 3.7 3.6   CHLORIDE 106 107 106 106   CO2 22.0 22.0 23.0 24.0   ANION GAP 11.0 10.0 12.0 13.0   BUN 15 19 19 18   CREATININE 0.76 0.85 0.74* 0.90   EGFR 93.7 90.6 94.5 89.1   GLUCOSE 99 97 190* 234*   CALCIUM 8.2* 8.2* 8.6 9.2   MAGNESIUM 2.1  --   --   --    PHOSPHORUS 2.7  --   --   --    HEMOGLOBIN A1C   --   --   --  6.90*   TSH  --   --   --  2.420         Lab 08/11/23  1822 08/11/23  0235   TOTAL PROTEIN 6.5 6.8   ALBUMIN 4.0 4.0   GLOBULIN 2.5 2.8   ALT (SGPT) 15 18   AST (SGOT) 15 19   BILIRUBIN 0.3 0.4   ALK PHOS 81 87   LIPASE 25 21         Lab 08/12/23  1642 08/12/23  1500 08/12/23  1016 08/11/23  1822 08/11/23  0459 08/11/23  0235   PROBNP  --   --   --  205.6  --  78.9   HSTROP T 41* 36*  --  38* 50* 22*   PROTIME  --   --  13.3  --   --   --    INR  --   --  1.00  --   --   --          Lab 08/11/23  0235   CHOLESTEROL 98   LDL CHOL 34   HDL CHOL 29*   TRIGLYCERIDES 222*             Brief Urine Lab Results       None            Microbiology Results Abnormal       None            Adult Transthoracic Echo Complete W/ Cont if Necessary Per Protocol    Result Date: 8/12/2023    Estimated left ventricular EF = 55%   Mild mitral valve regurgitation is present.     Cardiac Catheterization/Vascular Study    Result Date: 8/14/2023    Severe coronary artery disease with noncompliant to medications Evaluation for coronary artery bypass graft      Results for orders placed during the hospital encounter of 08/11/23    Adult Transthoracic Echo Complete W/ Cont if Necessary Per Protocol    Interpretation Summary    Estimated left ventricular EF = 55%    Mild mitral valve regurgitation is present.      Current medications:  Scheduled Meds:amLODIPine, 5 mg, Oral, Daily  aspirin, 81 mg, Oral, Daily  insulin lispro, 2-9 Units, Subcutaneous, 4x Daily AC & at Bedtime  ipratropium-albuterol, 3 mL, Nebulization, 4x Daily - RT  lisinopril, 5 mg, Oral, Daily  nicotine, 1 patch, Transdermal, Q24H  pantoprazole, 40 mg, Oral, Q AM  pharmacy consult - MTM, , Does not apply, Daily  rosuvastatin, 40 mg, Oral, Nightly  senna-docusate sodium, 2 tablet, Oral, BID  sodium chloride, 10 mL, Intravenous, Q12H  sotalol, 80 mg, Oral, Q12H  sucralfate, 1 g, Oral, TID AC  tamsulosin, 0.4 mg, Oral, Daily      Continuous Infusions:heparin, 9.5  Units/kg/hr, Last Rate: Stopped (23 1325)  nitroglycerin, 5-200 mcg/min, Last Rate: 10 mcg/min (23 1113)  Pharmacy to Dose Heparin,       PRN Meds:.  acetaminophen    aluminum-magnesium hydroxide-simethicone    senna-docusate sodium **AND** polyethylene glycol **AND** bisacodyl **AND** bisacodyl    Calcium Replacement - Follow Nurse / BPA Driven Protocol    dextrose    dextrose    glucagon (human recombinant)    HYDROcodone-acetaminophen    ipratropium-albuterol    LORazepam    Magnesium Standard Dose Replacement - Follow Nurse / BPA Driven Protocol    [] Morphine **AND** naloxone    nitroglycerin    ondansetron    Pharmacy to Dose Heparin    Phosphorus Replacement - Follow Nurse / BPA Driven Protocol    Potassium Replacement - Follow Nurse / BPA Driven Protocol    sodium chloride    sodium chloride    Assessment & Plan   Assessment & Plan     Active Hospital Problems    Diagnosis  POA    **NSTEMI (non-ST elevated myocardial infarction) [I21.4]  Yes    Type 2 diabetes mellitus with hyperglycemia, without long-term current use of insulin [E11.65]  Yes    Atrial fibrillation [I48.91]  Yes    Coronary artery disease [I25.10]  Yes    Hypertension [I10]  Yes    Hypercholesterolemia [E78.00]  Yes    Tobacco abuse [Z72.0]  Yes    COPD (chronic obstructive pulmonary disease) [J44.9]  Yes      Resolved Hospital Problems   No resolved problems to display.        Brief Hospital Course to date:  Scott Milan is a 73 y.o. male with a history of CAD and prior stents, allergies to clopidogrel and Effient, hypertension, hyperlipidemia, COPD, tobacco abuse who is presenting with unstable angina with elevated troponins, admitted for NSTEMI.  Cardiology completed left heart cath  that had severe multivessel coronary artery disease.    NSTEMI / Unstable Angina  CAD with hx of prior stents  HTN  HL  - s/p ASA 325mg in ED  - ALLERGY to effient and plavix -- deferred DAPT load for now  - continue sotalol,  "crestor, lisinopril  --echo ejection fraction of 55%  - HS troponin now trending down  - [facility] with severe coronary artery disease, recommending evaluation for CABG  -CTS consult    NIDDM2  - hold metformin, SSI ac&hs  - A1c = 6.9%     Ongoing tobacco abuse   COPD  - nicotine patch  - smoking cessation education. Long discussion abt smoking cessation   - duo nebs QID    Anemia:-unspecified  H/H stable.    Expected Discharge Location and Transportation: home  Expected Discharge   Expected Discharge Date: 8/15/2023; Expected Discharge Time:      DVT prophylaxis:  Medical DVT prophylaxis orders are present.          CODE STATUS:   Code Status and Medical Interventions:   Ordered at: 08/11/23 2011     Code Status (Patient has no pulse and is not breathing):    CPR (Attempt to Resuscitate)     Medical Interventions (Patient has pulse or is breathing):    Full Support       Radha Mooney MD  08/14/23        Electronically signed by Radha Mooney MD at 08/14/23 0503       Eli Siddiqui APRN at 08/13/23 0834       Attestation signed by Raul Rico MD at 08/13/23 0919    I have reviewed this documentation and agree.                    [facility]   Inpatient Progress Note       LOS: 2 days   Patient Care Team:  Tigre Cates MD as PCP - General (Family Medicine)  Sai Mukherjee III, MD as Cardiologist (Cardiology)    Chief Complaint:  follow-up for NSTEMI and chest pain    Subjective     Interval History:   Patient sitting on side of bed.  Had significant episode of discomfort after eating yesterday.  Some mild heaviness this morning.  Cardiac enzymes remained flat throughout episode.      Review of Systems:   Pertinent positives noted in history, exam, and assessment. Otherwise reviewed and negative.      Objective     Vitals:  Blood pressure 117/71, pulse 77, temperature 97.6 øF (36.4 øC), temperature source Oral, resp. rate 18, height 180.3 cm (70.98\"), weight 85.6 " kg (188 lb 11.4 oz), SpO2 93 %.     Intake/Output Summary (Last 24 hours) at 8/13/2023 0834  Last data filed at 8/13/2023 0520  Gross per 24 hour   Intake 840 ml   Output 650 ml   Net 190 ml     Vitals reviewed.   Constitutional:       Appearance: Well-developed and not in distress.   Neck:      Vascular: No JVD.      Trachea: No tracheal deviation.   Pulmonary:      Effort: Pulmonary effort is normal.      Breath sounds: Normal breath sounds.   Cardiovascular:      Normal rate. Regular rhythm.      Murmurs: There is no murmur.   Edema:     Peripheral edema absent.   Abdominal:      General: Bowel sounds are normal.      Tenderness: There is no abdominal tenderness.   Musculoskeletal:         General: No deformity. Skin:     General: Skin is warm and dry.   Neurological:      Mental Status: Alert and oriented to person, place, and time.          Results Review:     I reviewed the patient's new clinical results.    Results from last 7 days   Lab Units 08/13/23  0429   WBC 10*3/mm3 8.42   HEMOGLOBIN g/dL 10.8*   HEMATOCRIT % 32.9*   PLATELETS 10*3/mm3 175     Results from last 7 days   Lab Units 08/13/23  0429 08/11/23  1822   SODIUM mmol/L 139 141   POTASSIUM mmol/L 4.1 3.7   CHLORIDE mmol/L 107 106   CO2 mmol/L 22.0 23.0   BUN mg/dL 19 19   CREATININE mg/dL 0.85 0.74*   CALCIUM mg/dL 8.2* 8.6   BILIRUBIN mg/dL  --  0.3   ALK PHOS U/L  --  81   ALT (SGPT) U/L  --  15   AST (SGOT) U/L  --  15   GLUCOSE mg/dL 97 190*     Results from last 7 days   Lab Units 08/13/23  0429   SODIUM mmol/L 139   POTASSIUM mmol/L 4.1   CHLORIDE mmol/L 107   CO2 mmol/L 22.0   BUN mg/dL 19   CREATININE mg/dL 0.85   GLUCOSE mg/dL 97   CALCIUM mg/dL 8.2*     Results from last 7 days   Lab Units 08/12/23  1016   INR  1.00     Lab Results   Lab Value Date/Time    TROPONINT 41 (H) 08/12/2023 1642    TROPONINT 36 (H) 08/12/2023 1500    TROPONINT 38 (H) 08/11/2023 1822    TROPONINT 50 (H) 08/11/2023 0459    TROPONINT 22 (H) 08/11/2023 0234     TROPONINT <0.010 2021 1600    TROPONINT <0.010 2021 1404     Results from last 7 days   Lab Units 23  0235   TSH uIU/mL 2.420     Results from last 7 days   Lab Units 23  0235   CHOLESTEROL mg/dL 98   TRIGLYCERIDES mg/dL 222*   HDL CHOL mg/dL 29*   LDL CHOL mg/dL 34     Results from last 7 days   Lab Units 23  1822   PROBNP pg/mL 205.6     Results from last 7 days   Lab Units 23  1642 23  1500 23  1822   HSTROP T ng/L 41* 36* 38*     Echo:    Estimated left ventricular EF = 55%    Mild mitral valve regurgitation is present.    Tele: Sinus rhythm    Assessment:      NSTEMI (non-ST elevated myocardial infarction)    Atrial fibrillation    Coronary artery disease    Hypertension    Hypercholesterolemia    Tobacco abuse    COPD (chronic obstructive pulmonary disease)    Type 2 diabetes mellitus with hyperglycemia, without long-term current use of insulin      NSTEMI  EF normal by echo.  CAD with previous stents.  Ongoing tobacco abuse  Dyslipidemia  Hypertension  PAF, previously intolerant to anticoagulation.  On sotalol.    Plan:  Add scheduled Carafate for possibility of esophageal discomfort giving some symptoms after eating.  Continue IV nitroglycerin and heparin.  N.p.o. after midnight with plans for cardiac catheterization plus or minus catheter-based intervention tomorrow.  This was discussed with the patient.  He verbalizes understanding and wishes to proceed.      ZELALEM Warren   Dictated utilizing Dragon dictation      Electronically signed by Raul Rico MD at 23 0919       Radha Mooney MD at 23 0829              [facility] Medicine Services  PROGRESS NOTE    Patient Name: Scott Milan  : 1950  MRN: [id number]    Date of Admission: 2023  Primary Care Physician: Tigre Cates MD    Subjective   Subjective     CC:  CP    HPI:  Sitting up eating breakfast, c/o L arm  pain    ROS:  Gen- No fevers, chills  CV- No chest pain, palpitations  Resp- No cough, dyspnea  GI- No N/V/D, abd pain       Objective   Objective     Vital Signs:   Temp:  [97.6 øF (36.4 øC)-97.8 øF (36.6 øC)] 97.6 øF (36.4 øC)  Heart Rate:  [64-91] 77  Resp:  [18-22] 18  BP: ()/() 117/71  Flow (L/min):  [2] 2     Physical Exam:  Constitutional: No acute distress, awake, alert  HENT: NCAT, mucous membranes moist  Respiratory: Clear to auscultation bilaterally, respiratory effort normal   Cardiovascular: RRR, no murmurs, rubs, or gallops  Gastrointestinal: Positive bowel sounds, soft, nontender, nondistended  Musculoskeletal: No bilateral ankle edema  Psychiatric: Appropriate affect, cooperative  Neurologic: Oriented x 3, no focal neuro deficits  Skin: No rashes      Results Reviewed:  LAB RESULTS:      Lab 08/13/23  0429 08/12/23  1642 08/12/23  1504 08/12/23  1016 08/11/23  1822 08/11/23  0235   WBC 8.42  --  9.98 8.88 7.93 9.06   HEMOGLOBIN 10.8*  --  12.1* 12.6* 12.4* 13.3   HEMATOCRIT 32.9*  --  36.9* 38.9 37.6 40.5   PLATELETS 175  --  193 205 194 237   NEUTROS ABS 4.82  --   --  5.70 5.18 5.76   IMMATURE GRANS (ABS) 0.03  --   --  0.03 0.01 0.04   LYMPHS ABS 2.25  --   --  1.87 1.55 1.84   MONOS ABS 0.85  --   --  0.81 0.76 0.92*   EOS ABS 0.43*  --   --  0.43* 0.39 0.46*   MCV 90.6  --  91.1 91.1 92.2 89.6   PROTIME  --   --   --  13.3  --   --    APTT  --   --   --  30.1*  --   --    HEPARIN ANTI-XA 0.23* 0.15*  --  0.10*  --   --    D DIMER QUANT  --   --   --   --   --  0.38         Lab 08/13/23  0429 08/11/23 1822 08/11/23  0235   SODIUM 139 141 143   POTASSIUM 4.1 3.7 3.6   CHLORIDE 107 106 106   CO2 22.0 23.0 24.0   ANION GAP 10.0 12.0 13.0   BUN 19 19 18   CREATININE 0.85 0.74* 0.90   EGFR 90.6 94.5 89.1   GLUCOSE 97 190* 234*   CALCIUM 8.2* 8.6 9.2   HEMOGLOBIN A1C  --   --  6.90*   TSH  --   --  2.420         Lab 08/11/23 1822 08/11/23  0235   TOTAL PROTEIN 6.5 6.8   ALBUMIN 4.0 4.0    GLOBULIN 2.5 2.8   ALT (SGPT) 15 18   AST (SGOT) 15 19   BILIRUBIN 0.3 0.4   ALK PHOS 81 87   LIPASE 25 21         Lab 08/12/23  1642 08/12/23  1500 08/12/23  1016 08/11/23  1822 08/11/23  0459 08/11/23  0235   PROBNP  --   --   --  205.6  --  78.9   HSTROP T 41* 36*  --  38* 50* 22*   PROTIME  --   --  13.3  --   --   --    INR  --   --  1.00  --   --   --          Lab 08/11/23  0235   CHOLESTEROL 98   LDL CHOL 34   HDL CHOL 29*   TRIGLYCERIDES 222*             Brief Urine Lab Results       None            Microbiology Results Abnormal       None            Adult Transthoracic Echo Complete W/ Cont if Necessary Per Protocol    Result Date: 8/12/2023    Estimated left ventricular EF = 55%   Mild mitral valve regurgitation is present.     XR Chest 1 View    Result Date: 8/11/2023  XR CHEST 1 VW Date of Exam: 8/11/2023 6:20 PM EDT Indication: Chest Pain Triage Protocol Comparison: 8/11/2023 Findings: Heart mediastinum and pulmonary vasculature appear within normal limits. There is very mild bibasilar discoid atelectasis, a little increased from the prior study, but the lungs otherwise appear grossly clear. No edema, effusion or pneumothorax is seen.     Impression: Impression: Very mild bibasilar atelectasis. No other evidence of active chest pathology. Electronically Signed: Bernabe Ceja MD  8/11/2023 7:02 PM EDT  Workstation ID: [id number]     Results for orders placed during the hospital encounter of 08/11/23    Adult Transthoracic Echo Complete W/ Cont if Necessary Per Protocol    Interpretation Summary    Estimated left ventricular EF = 55%    Mild mitral valve regurgitation is present.      Current medications:  Scheduled Meds:amLODIPine, 5 mg, Oral, Daily  aspirin, 324 mg, Oral, Once  aspirin, 81 mg, Oral, Daily  insulin lispro, 2-9 Units, Subcutaneous, 4x Daily AC & at Bedtime  ipratropium-albuterol, 3 mL, Nebulization, 4x Daily - RT  lisinopril, 5 mg, Oral, Daily  nicotine, 1 patch, Transdermal,  Q24H  pantoprazole, 40 mg, Intravenous, Q AM  pharmacy consult - MTM, , Does not apply, Daily  rosuvastatin, 40 mg, Oral, Nightly  senna-docusate sodium, 2 tablet, Oral, BID  sodium chloride, 10 mL, Intravenous, Q12H  sotalol, 80 mg, Oral, Q12H  tamsulosin, 0.4 mg, Oral, Daily      Continuous Infusions:heparin, 15 Units/kg/hr, Last Rate: 15 Units/kg/hr (08/13/23 0621)  nitroglycerin, 5-200 mcg/min, Last Rate: 40 mcg/min (08/12/23 9618)  Pharmacy to Dose Heparin,       PRN Meds:.  acetaminophen    aluminum-magnesium hydroxide-simethicone    senna-docusate sodium **AND** polyethylene glycol **AND** bisacodyl **AND** bisacodyl    Calcium Replacement - Follow Nurse / BPA Driven Protocol    dextrose    dextrose    glucagon (human recombinant)    HYDROcodone-acetaminophen    LORazepam    Magnesium Standard Dose Replacement - Follow Nurse / BPA Driven Protocol    Morphine **AND** naloxone    nitroglycerin    ondansetron    Pharmacy to Dose Heparin    Phosphorus Replacement - Follow Nurse / BPA Driven Protocol    Potassium Replacement - Follow Nurse / BPA Driven Protocol    sodium chloride    sodium chloride    Assessment & Plan   Assessment & Plan     Active Hospital Problems    Diagnosis  POA    **NSTEMI (non-ST elevated myocardial infarction) [I21.4]  Yes    Type 2 diabetes mellitus with hyperglycemia, without long-term current use of insulin [E11.65]  Yes    Atrial fibrillation [I48.91]  Yes    Coronary artery disease [I25.10]  Yes    Hypertension [I10]  Yes    Hypercholesterolemia [E78.00]  Yes    Tobacco abuse [Z72.0]  Yes    COPD (chronic obstructive pulmonary disease) [J44.9]  Yes      Resolved Hospital Problems   No resolved problems to display.        Brief Hospital Course to date:  Scott Milan is a 73 y.o. male with a history of CAD and prior stents, allergies to clopidogrel and Effient, hypertension, hyperlipidemia, COPD, tobacco abuse who is presenting with unstable angina with elevated troponins,  admitted for NSTEMI.  Cardiology consulted plan for cath Tameka>    NSTEMI / Unstable Angina  CAD with hx of prior stents  HTN  HL  - s/p ASA 325mg in ED  - ALLERGY to effient and plavix -- deferred DAPT load for now  - continue nitro gtt --Pt with some arm pain worse after eating, not improved after GI cocktail last night.  Will continue to monitor.    - continue sotalol, crestor, lisinopril  --echo pending  - HS troponin now trending down  - cardiology to perform cardiac cath on Mon     NIDDM2  - hold metformin, SSI ac&hs  - A1c = 6.9%     Ongoing tobacco abuse   COPD  - nicotine patch  - smoking cessation education. Left AMA overnight and came back bc he was craving a cigarette. Long discussion abt smoking cessation today  - duo scottie QID    Anemia:-unspecified  Drop in H/H--no blood in stool or other evident blood loss.  -Repeat in a.m.    Expected Discharge Location and Transportation: home  Expected Discharge   Expected Discharge Date: 8/15/2023; Expected Discharge Time:      DVT prophylaxis:  Medical DVT prophylaxis orders are present.          CODE STATUS:   Code Status and Medical Interventions:   Ordered at: 23     Code Status (Patient has no pulse and is not breathing):    CPR (Attempt to Resuscitate)     Medical Interventions (Patient has pulse or is breathing):    Full Support       Radha Mooney MD  23        Electronically signed by Radha Mooney MD at 23 1457       Noreen Santos MD at 23 1018              [facility] Medicine Services  PROGRESS NOTE    Patient Name: Scott Milan  : 1950  MRN: [id number]    Date of Admission: 2023  Primary Care Physician: Tigre Cates MD    Subjective   Subjective     CC:  CP    HPI:  Resting comfortably without chest pain currently. Family at bedside. Wanting to smoke. We discussed smoking cessation    ROS:  Gen- No fevers, chills  CV- No chest pain, palpitations  Resp- No  cough, dyspnea  GI- No N/V/D, abd pain       Objective   Objective     Vital Signs:   Temp:  [97.6 øF (36.4 øC)-98.3 øF (36.8 øC)] 98.3 øF (36.8 øC)  Heart Rate:  [69-88] 79  Resp:  [16-21] 16  BP: (102-142)/(52-82) 134/78  Flow (L/min):  [2-3] 2     Physical Exam:  Constitutional: No acute distress, awake, alert  HENT: NCAT, mucous membranes moist  Respiratory: Clear to auscultation bilaterally, respiratory effort normal   Cardiovascular: RRR, no murmurs, rubs, or gallops  Gastrointestinal: Positive bowel sounds, soft, nontender, nondistended  Musculoskeletal: No bilateral ankle edema  Psychiatric: Appropriate affect, cooperative  Neurologic: Oriented x 3, no focal neuro deficits  Skin: No rashes      Results Reviewed:  LAB RESULTS:      Lab 08/11/23 1822 08/11/23 0235   WBC 7.93 9.06   HEMOGLOBIN 12.4* 13.3   HEMATOCRIT 37.6 40.5   PLATELETS 194 237   NEUTROS ABS 5.18 5.76   IMMATURE GRANS (ABS) 0.01 0.04   LYMPHS ABS 1.55 1.84   MONOS ABS 0.76 0.92*   EOS ABS 0.39 0.46*   MCV 92.2 89.6   D DIMER QUANT  --  0.38         Lab 08/11/23 1822 08/11/23 0235   SODIUM 141 143   POTASSIUM 3.7 3.6   CHLORIDE 106 106   CO2 23.0 24.0   ANION GAP 12.0 13.0   BUN 19 18   CREATININE 0.74* 0.90   EGFR 94.5 89.1   GLUCOSE 190* 234*   CALCIUM 8.6 9.2   HEMOGLOBIN A1C  --  6.90*   TSH  --  2.420         Lab 08/11/23 1822 08/11/23 0235   TOTAL PROTEIN 6.5 6.8   ALBUMIN 4.0 4.0   GLOBULIN 2.5 2.8   ALT (SGPT) 15 18   AST (SGOT) 15 19   BILIRUBIN 0.3 0.4   ALK PHOS 81 87   LIPASE 25 21         Lab 08/11/23 1822 08/11/23 0459 08/11/23  0235   PROBNP 205.6  --  78.9   HSTROP T 38* 50* 22*         Lab 08/11/23  0235   CHOLESTEROL 98   LDL CHOL 34   HDL CHOL 29*   TRIGLYCERIDES 222*             Brief Urine Lab Results       None            Microbiology Results Abnormal       None            XR Chest 1 View    Result Date: 8/11/2023  XR CHEST 1 VW Date of Exam: 8/11/2023 6:20 PM EDT Indication: Chest Pain Triage Protocol Comparison:  8/11/2023 Findings: Heart mediastinum and pulmonary vasculature appear within normal limits. There is very mild bibasilar discoid atelectasis, a little increased from the prior study, but the lungs otherwise appear grossly clear. No edema, effusion or pneumothorax is seen.     Impression: Impression: Very mild bibasilar atelectasis. No other evidence of active chest pathology. Electronically Signed: Bernabe Ceja MD  8/11/2023 7:02 PM EDT  Workstation ID: [id number]    XR Chest 1 View    Result Date: 8/11/2023  XR CHEST 1 VW Date of Exam: 8/11/2023 2:41 AM EDT Indication: Chest Pain Triage Protocol Comparison: 11/16/2021. Findings: There is no pneumothorax, pleural effusion or focal airspace consolidation. Heart size and pulmonary vasculature appear within normal limits. Regional bones appear intact.     Impression: Impression: No acute cardiopulmonary abnormality. Electronically Signed: Abrahan Baugh MD  8/11/2023 2:59 AM EDT  Workstation ID: [id number]         Current medications:  Scheduled Meds:amLODIPine, 5 mg, Oral, Daily  aspirin, 324 mg, Oral, Once  aspirin, 81 mg, Oral, Daily  insulin lispro, 2-9 Units, Subcutaneous, 4x Daily AC & at Bedtime  ipratropium-albuterol, 3 mL, Nebulization, 4x Daily - RT  lisinopril, 5 mg, Oral, Daily  nicotine, 1 patch, Transdermal, Q24H  pharmacy consult - MTM, , Does not apply, Daily  rosuvastatin, 40 mg, Oral, Nightly  senna-docusate sodium, 2 tablet, Oral, BID  sodium chloride, 10 mL, Intravenous, Q12H  sotalol, 80 mg, Oral, Q12H  tamsulosin, 0.4 mg, Oral, Daily      Continuous Infusions:heparin, 11.7 Units/kg/hr  nitroglycerin, 5-200 mcg/min, Last Rate: 10 mcg/min (08/12/23 0832)  Pharmacy to Dose Heparin,       PRN Meds:.  acetaminophen    aluminum-magnesium hydroxide-simethicone    senna-docusate sodium **AND** polyethylene glycol **AND** bisacodyl **AND** bisacodyl    Calcium Replacement - Follow Nurse / BPA Driven Protocol    dextrose    dextrose    glucagon (human  recombinant)    HYDROcodone-acetaminophen    LORazepam    Magnesium Standard Dose Replacement - Follow Nurse / BPA Driven Protocol    Morphine **AND** naloxone    nitroglycerin    ondansetron    Pharmacy to Dose Heparin    Phosphorus Replacement - Follow Nurse / BPA Driven Protocol    Potassium Replacement - Follow Nurse / BPA Driven Protocol    sodium chloride    sodium chloride    Assessment & Plan   Assessment & Plan     Active Hospital Problems    Diagnosis  POA    **NSTEMI (non-ST elevated myocardial infarction) [I21.4]  Yes    Type 2 diabetes mellitus with hyperglycemia, without long-term current use of insulin [E11.65]  Yes    Atrial fibrillation [I48.91]  Yes    Coronary artery disease [I25.10]  Yes    Hypertension [I10]  Yes    Hypercholesterolemia [E78.00]  Yes    Tobacco abuse [Z72.0]  Yes    COPD (chronic obstructive pulmonary disease) [J44.9]  Yes      Resolved Hospital Problems   No resolved problems to display.        Brief Hospital Course to date:  Scott Milan is a 73 y.o. male with a history of CAD and prior stents, allergies to clopidogrel and Effient, hypertension, hyperlipidemia, COPD, tobacco abuse who is presenting with unstable angina with elevated troponins, admitted for NSTEMI.  Cardiology consulted and plans for left heart cath on Monday    NSTEMI / Unstable Angina  CAD with hx of prior stents  HTN  HL  - PRO = 6  - s/p ASA 325mg in ED  - ALLERGY to effient and plavix -- deferred DAPT load for now  - continue nitro gtt -- currently CP free  - continue sotalol, crestor, lisinopril  --echo pending  - HS troponin now trending down  - cardiology to perform cardiac cath on Mon     NIDDM2  - hold metformin, SSI ac&hs  - A1c = 6.9%     Ongoing tobacco abuse   COPD  - nicotine patch  - smoking cessation education. Left AMA overnight and came back bc he was craving a cigarette. Long discussion abt smoking cessation today  - duo nebs QID    Expected Discharge Location and Transportation:  home  Expected Discharge   Expected Discharge Date: 8/15/2023; Expected Discharge Time:      DVT prophylaxis:  Medical DVT prophylaxis orders are present.          CODE STATUS:   Code Status and Medical Interventions:   Ordered at: 08/11/23 2011     Code Status (Patient has no pulse and is not breathing):    CPR (Attempt to Resuscitate)     Medical Interventions (Patient has pulse or is breathing):    Full Support       Noreen Santos MD  08/12/23        Electronically signed by Noreen Santos MD at 08/12/23 1024          Consult Notes (last 7 days)        Raul Rico MD at 08/12/23 0847        Consult Orders    1. Inpatient Cardiology Consult [[id number]] ordered by Yajaira Santos MD at 08/11/23 2011                 [facility]   Consult Note    Referring Provider: Dr. Santos    Primary cardiologist: Dr. Mukherjee    Reason for Consultation: NSTEMI    Patient Care Team:  Tigre Cates MD as PCP - General (Family Medicine)  Sai Mukherjee III, MD as Cardiologist (Cardiology)    Past Medical History:   Diagnosis Date    Allergic rhinitis     seasonal    Atrial fibrillation     COPD (chronic obstructive pulmonary disease)     Coronary artery disease     History of cardiac catheterization     Hypercholesterolemia     Hyperlipidemia     Hypertension     Kidney stones     Recurrent chest pain     Tobacco abuse        Past Surgical History:   Procedure Laterality Date    CORONARY ANGIOPLASTY WITH STENT PLACEMENT           Allergies   Allergen Reactions    Effient [Prasugrel] Rash    Clopidogrel Other (See Comments)     red, hot, flushed face    Prednisone Unknown - Low Severity           Current Facility-Administered Medications:     acetaminophen (TYLENOL) tablet 650 mg, 650 mg, Oral, Q4H PRN, Yajaira Santos MD    aluminum-magnesium hydroxide-simethicone (MAALOX MAX) 400-400-40 MG/5ML suspension 15 mL, 15 mL, Oral, Q6H PRN, Yajaira Santos MD, 15 mL at 08/12/23 0152    amLODIPine (NORVASC) tablet  5 mg, 5 mg, Oral, Daily, Yajaira Santos MD, 5 mg at 08/12/23 0833    aspirin chewable tablet 324 mg, 324 mg, Oral, Once, Sanchez Crespo,     aspirin EC tablet 81 mg, 81 mg, Oral, Daily, Yajaira Santos MD, 81 mg at 08/12/23 0833    sennosides-docusate (PERICOLACE) 8.6-50 MG per tablet 2 tablet, 2 tablet, Oral, BID, 2 tablet at 08/12/23 0833 **AND** polyethylene glycol (MIRALAX) packet 17 g, 17 g, Oral, Daily PRN **AND** bisacodyl (DULCOLAX) EC tablet 5 mg, 5 mg, Oral, Daily PRN **AND** bisacodyl (DULCOLAX) suppository 10 mg, 10 mg, Rectal, Daily PRN, Yajaira Santos MD    Calcium Replacement - Follow Nurse / BPA Driven Protocol, , Does not apply, PRN, Yajaira Santos MD    dextrose (D50W) (25 g/50 mL) IV injection 25 g, 25 g, Intravenous, Q15 Min PRN, Yajaira Santos MD    dextrose (GLUTOSE) oral gel 15 g, 15 g, Oral, Q15 Min PRN, Yajaira Santos MD    Enoxaparin Sodium (LOVENOX) syringe 90 mg, 1 mg/kg, Subcutaneous, Once, Yajaira Santos MD    glucagon (GLUCAGEN) injection 1 mg, 1 mg, Intramuscular, Q15 Min PRN, Yajaira Santos MD    HYDROcodone-acetaminophen (NORCO) 7.5-325 MG per tablet 1 tablet, 1 tablet, Oral, Q6H PRN, Yajaira Santos MD, 1 tablet at 08/12/23 0542    Insulin Lispro (humaLOG) injection 2-9 Units, 2-9 Units, Subcutaneous, 4x Daily AC & at Bedtime, Yajaira Santos MD    ipratropium-albuterol (DUO-NEB) nebulizer solution 3 mL, 3 mL, Nebulization, 4x Daily - RT, Yajaira Santos MD, 3 mL at 08/12/23 0758    lisinopril (PRINIVIL,ZESTRIL) tablet 5 mg, 5 mg, Oral, Daily, Yajaira Santos MD, 5 mg at 08/12/23 0834    LORazepam (ATIVAN) tablet 2 mg, 2 mg, Oral, Q6H PRN, Yajaira Santos MD    Magnesium Standard Dose Replacement - Follow Nurse / BPA Driven Protocol, , Does not apply, PRN, Yajaira Santos MD    morphine injection 1 mg, 1 mg, Intravenous, Q4H PRN, 1 mg at 08/12/23 0159 **AND** naloxone (NARCAN) injection 0.4 mg, 0.4 mg, Intravenous, Q5 Min PRN, Yajaira Santos MD    nicotine (NICODERM CQ) 21 MG/24HR patch 1 patch, 1 patch,  Transdermal, Q24H, Yajaira Santos MD, 1 patch at 08/11/23 2225    nitroglycerin (NITROSTAT) SL tablet 0.4 mg, 0.4 mg, Sublingual, Q5 Min PRN, Yajaira Santos MD    nitroglycerin (TRIDIL) 200 mcg/ml infusion, 5-200 mcg/min, Intravenous, Titrated, Yajaira Santos MD, Last Rate: 3 mL/hr at 08/12/23 0832, 10 mcg/min at 08/12/23 0832    ondansetron (ZOFRAN) injection 4 mg, 4 mg, Intravenous, Q6H PRN, Yajaira Santos MD    Phosphorus Replacement - Follow Nurse / BPA Driven Protocol, , Does not apply, PRN, Yajaira Santos MD    Potassium Replacement - Follow Nurse / BPA Driven Protocol, , Does not apply, PRTom DAVILA Holly, MD    rosuvastatin (CRESTOR) tablet 40 mg, 40 mg, Oral, Nightly, Yajaira Santos MD    sodium chloride 0.9 % flush 10 mL, 10 mL, Intravenous, Q12H, Yajaira Santos MD, 10 mL at 08/11/23 2156    sodium chloride 0.9 % flush 10 mL, 10 mL, Intravenous, PRN, Yajaira Santos MD    sodium chloride 0.9 % infusion 40 mL, 40 mL, Intravenous, PRN, Yajaira Santos MD    sotalol (BETAPACE) tablet 80 mg, 80 mg, Oral, Q12H, Yajaira Santos MD, 80 mg at 08/12/23 0833    tamsulosin (FLOMAX) 24 hr capsule 0.4 mg, 0.4 mg, Oral, Daily, Yajaira Santos MD, 0.4 mg at 08/12/23 0833    nitroglycerin, 5-200 mcg/min, Last Rate: 10 mcg/min (08/12/23 0832)        Medications Prior to Admission   Medication Sig Dispense Refill Last Dose    albuterol sulfate  (90 Base) MCG/ACT inhaler As Needed.   8/11/2023    amLODIPine (NORVASC) 5 MG tablet Take 1 tablet by mouth Daily. Need lab work and f/u appt for further refills. 90 tablet 0 8/11/2023    aspirin 81 MG tablet Take 1 tablet by mouth Daily.   8/11/2023    HYDROcodone-acetaminophen (NORCO) 7.5-325 MG per tablet Take 1 tablet by mouth Every 6 (Six) Hours As Needed for Moderate Pain  or Severe Pain . 12 tablet 0 8/10/2023    metFORMIN (GLUCOPHAGE) 500 MG tablet Take 1 tablet by mouth 2 (Two) Times a Day.   8/11/2023    nitroglycerin (NITROSTAT) 0.4 MG SL tablet Place 1 tablet under the tongue Every 5 (Five)  Minutes As Needed for Chest Pain. Take no more than 3 doses in 15 minutes. 100 tablet 0     ondansetron ODT (ZOFRAN-ODT) 4 MG disintegrating tablet Place 1 tablet on the tongue Every 8 (Eight) Hours As Needed for Nausea or Vomiting. 12 tablet 0     rosuvastatin (CRESTOR) 20 MG tablet Take 1 tablet by mouth Daily. 90 tablet 1 8/11/2023    sotalol (BETAPACE) 80 MG tablet TAKE ONE (1) TABLET BY MOUTH TWICE A DAY  180 tablet 0 8/11/2023    Spiriva HandiHaler 18 MCG per inhalation capsule As Needed.   8/11/2023    tamsulosin (FLOMAX) 0.4 MG capsule 24 hr capsule Daily.   8/11/2023         Subjective .   History of present illness:    Patient is a 73-year-old  male who we are asked to see today for further evaluation of NSTEMI.  He has previous history of CAD and multiple cardiac risk factors.  Patient notes he was in his usual state of health up until the last 2 to 3 days.  Has had been having recurrent midsternal chest burning sensation with radiation into his back and shoulders.  Also has been having shortness of breath.  He is unclear if this is more related to his COPD or heart.  No reported syncope.  Discomfort has been responsive to nitroglycerin.  Due to significant episode he presented to the hospital yesterday.  Here cardiac enzymes were noted to be elevated/ruled in for NSTEMI and he was subsequently admitted.  Patient is currently on IV nitroglycerin and pain-free.      Social History     Socioeconomic History    Marital status:    Tobacco Use    Smoking status: Every Day     Packs/day: 2.00     Types: Cigarettes    Smokeless tobacco: Never   Vaping Use    Vaping Use: Never used   Substance and Sexual Activity    Alcohol use: No    Drug use: No    Sexual activity: Defer     Family History   Problem Relation Age of Onset    No Known Problems Mother     No Known Problems Father          Review of Systems:  Review of Systems   Constitutional: Positive for malaise/fatigue. Negative for fever.  "  HENT:  Negative for nosebleeds.    Eyes:  Negative for redness and visual disturbance.   Cardiovascular:  Positive for chest pain and dyspnea on exertion. Negative for orthopnea, palpitations and paroxysmal nocturnal dyspnea.   Respiratory:  Positive for cough, shortness of breath and wheezing. Negative for snoring and sputum production.    Hematologic/Lymphatic: Negative for bleeding problem.   Skin:  Negative for flushing, itching and rash.   Musculoskeletal:  Positive for arthritis, back pain and joint pain. Negative for falls and muscle cramps.   Gastrointestinal:  Positive for constipation. Negative for abdominal pain, diarrhea, heartburn, nausea and vomiting.   Genitourinary:  Negative for hematuria.   Neurological:  Negative for excessive daytime sleepiness, dizziness, headaches, tremors and weakness.   Psychiatric/Behavioral:  Negative for substance abuse. The patient is not nervous/anxious.             Objective   Vitals:  /78   Pulse 79   Temp 98.3 øF (36.8 øC) (Oral)   Resp 16   Ht 180.3 cm (71\")   Wt 85.6 kg (188 lb 11.2 oz)   SpO2 93%   BMI 26.32 kg/mý    No intake or output data in the 24 hours ending 08/12/23 0848    Vitals reviewed.   Constitutional:       Appearance: Well-developed and not in distress. Chronically ill-appearing.   Neck:      Vascular: No JVD.      Trachea: No tracheal deviation.   Pulmonary:      Effort: Pulmonary effort is normal.      Breath sounds: Scattered Wheezing present.   Cardiovascular:      Normal rate. Regular rhythm.      Murmurs: There is no murmur.   Pulses:     Intact distal pulses.   Edema:     Peripheral edema absent.   Abdominal:      General: Bowel sounds are normal.      Palpations: Abdomen is soft.      Tenderness: There is no abdominal tenderness.   Musculoskeletal:         General: No deformity. Skin:     General: Skin is warm and dry.   Neurological:      Mental Status: Alert and oriented to person, place, and time.     I have examined the " patient and agree with the above         Results Review:  I reviewed the patient's new clinical results.  Results from last 7 days   Lab Units 08/11/23  1822   WBC 10*3/mm3 7.93   HEMOGLOBIN g/dL 12.4*   HEMATOCRIT % 37.6   PLATELETS 10*3/mm3 194     Results from last 7 days   Lab Units 08/11/23  1822   SODIUM mmol/L 141   POTASSIUM mmol/L 3.7   CHLORIDE mmol/L 106   CO2 mmol/L 23.0   BUN mg/dL 19   CREATININE mg/dL 0.74*   CALCIUM mg/dL 8.6   BILIRUBIN mg/dL 0.3   ALK PHOS U/L 81   ALT (SGPT) U/L 15   AST (SGOT) U/L 15   GLUCOSE mg/dL 190*     Results from last 7 days   Lab Units 08/11/23  1822   SODIUM mmol/L 141   POTASSIUM mmol/L 3.7   CHLORIDE mmol/L 106   CO2 mmol/L 23.0   BUN mg/dL 19   CREATININE mg/dL 0.74*   GLUCOSE mg/dL 190*   CALCIUM mg/dL 8.6         Lab Results   Lab Value Date/Time    TROPONINT 38 (H) 08/11/2023 1822    TROPONINT 50 (H) 08/11/2023 0459    TROPONINT 22 (H) 08/11/2023 0235    TROPONINT <0.010 11/16/2021 1600    TROPONINT <0.010 11/16/2021 1404     Results from last 7 days   Lab Units 08/11/23  0235   TSH uIU/mL 2.420     Results from last 7 days   Lab Units 08/11/23  0235   CHOLESTEROL mg/dL 98   TRIGLYCERIDES mg/dL 222*   HDL CHOL mg/dL 29*   LDL CHOL mg/dL 34     Results from last 7 days   Lab Units 08/11/23  1822   PROBNP pg/mL 205.6           Tele:  SR    EKG: SR no acute changes.       Assessment & Plan     NSTEMI  CAD with previous stents.  Ongoing tobacco abuse  Dyslipidemia  Hypertension  PAF, previously intolerant to anticoagulation.  On sotalol.      Plan:    Patient currently stable on nitroglycerin.  We will add heparin drip.  We will plan for cardiac catheterization plus or minus catheter-based intervention on Monday.  This was discussed with patient and family.  Continue current CV medications.  Check echocardiogram today.  We will continue to follow along.    ZELALEM Warren obtained past medical, family history, social history, review of systems and functioned as  a scribe for the remainder of the dictation for Dr. Rico.     I have seen and examined the patient, I have reviewed the note, discussed the case with the advance practice clinician, made necessary changes and I agree with the final note.    Raul Rico MD  08/12/23  09:43 EDT        Dictated utilizing Dragon dictation      Electronically signed by Raul Rico MD at 08/12/23 0943       Larissa Tobin PA-C at 08/14/23 1609        Consult Orders    1. Inpatient Cardiothoracic Surgery Consult [[id number]] ordered by Hua Han MD at 08/14/23 1019                 CTS Consult    Patient Care Team:  Tigre Cates MD as PCP - General (Family Medicine)  Sai Mukherjee III, MD as Cardiologist (Cardiology)      Reason for Consult:  Coronary artery disease    HPI  Patient is a 73 y.o. male with a history of coronary artery disease s/p previous stents, current tobacco smoker, type 2 diabetes mellitus, hypertension, hyperlipidemia and COPD who presented to [facility] ED on 8/11 with complaints of crushing chest pain with radiation to his left arm and associated shortness of breath that awakened him from sleep and was relieved with sublingual nitroglycerin.  Patient reports progressively worsening fatigue and shortness of breath over the last couple of months. Patient was diagnosed with NSTEMI in ED but patient left AMA prior to admission.  He returned to [facility] ED later in the day when chest pressure recurred and underwent cardiac catheterization today which revealed multivessel coronary artery disease including left main disease for which we have been asked to evaluate.  Patient denies current chest pain or shortness of breath.      Review of Systems  Constitutional: Positive for malaise/fatigue.  Negative for chills, fever, night sweats and weight loss.  HENT: Negative for hearing loss, odynophagia and sore throat.    Cardiovascular: Positive for chest pain  Respiratory: Positive for shortness of breath.  Negative  for cough and hemoptysis.  Endocrine:  Negative for cold intolerance, heat intolerance, polydipsia, polyphagia and polyuria.  Hematologic/Lymphatic: Negative for easy bruising/bleeding.  Musculoskeletal: Negative for joint pain, joint swelling and myalgias.  Gastrointestinal: Negative for abdominal pain, constipation, diarrhea, hematemesis, hematochezia, melena, nausea and vomiting.  Genitourinary: Negative for dysuria, frequency and hematuria.  Neurological: Negative for focal weakness, headaches, numbness and seizures.  Psychiatric/Behavioral: Negative for depression and suicidal ideas.  The patient is not nervous/anxious.    All other systems are reviewed and are negative.    History  Past Medical History:   Diagnosis Date    Allergic rhinitis     seasonal    Atrial fibrillation     COPD (chronic obstructive pulmonary disease)     Coronary artery disease     History of cardiac catheterization     Hypercholesterolemia     Hyperlipidemia     Hypertension     Kidney stones     Recurrent chest pain     Tobacco abuse      Past Surgical History:   Procedure Laterality Date    CORONARY ANGIOPLASTY WITH STENT PLACEMENT       Family History   Problem Relation Age of Onset    No Known Problems Mother     No Known Problems Father      Social History     Tobacco Use    Smoking status: Every Day     Packs/day: 2.00     Types: Cigarettes    Smokeless tobacco: Never   Vaping Use    Vaping Use: Never used   Substance Use Topics    Alcohol use: No    Drug use: No     Medications Prior to Admission   Medication Sig Dispense Refill Last Dose    albuterol sulfate  (90 Base) MCG/ACT inhaler As Needed.   8/11/2023    amLODIPine (NORVASC) 5 MG tablet Take 1 tablet by mouth Daily. Need lab work and f/u appt for further refills. 90 tablet 0 8/11/2023    aspirin 81 MG tablet Take 1 tablet by mouth Daily.   8/11/2023    HYDROcodone-acetaminophen (NORCO) 7.5-325 MG per tablet Take 1 tablet by mouth Every 6 (Six) Hours As Needed for  Moderate Pain  or Severe Pain . 12 tablet 0 8/10/2023    metFORMIN (GLUCOPHAGE) 500 MG tablet Take 1 tablet by mouth 2 (Two) Times a Day.   8/11/2023    nitroglycerin (NITROSTAT) 0.4 MG SL tablet Place 1 tablet under the tongue Every 5 (Five) Minutes As Needed for Chest Pain. Take no more than 3 doses in 15 minutes. 100 tablet 0     ondansetron ODT (ZOFRAN-ODT) 4 MG disintegrating tablet Place 1 tablet on the tongue Every 8 (Eight) Hours As Needed for Nausea or Vomiting. 12 tablet 0     rosuvastatin (CRESTOR) 20 MG tablet Take 1 tablet by mouth Daily. 90 tablet 1 8/11/2023    sotalol (BETAPACE) 80 MG tablet TAKE ONE (1) TABLET BY MOUTH TWICE A DAY  180 tablet 0 8/11/2023    Spiriva HandiHaler 18 MCG per inhalation capsule As Needed.   8/11/2023    tamsulosin (FLOMAX) 0.4 MG capsule 24 hr capsule Daily.   8/11/2023     Allergies:  Effient [prasugrel], Clopidogrel, and Prednisone    Objective    Vital Signs  Temp:  [98.2 øF (36.8 øC)] 98.2 øF (36.8 øC)  Heart Rate:  [70-83] 79  Resp:  [18-20] 18  BP: (102-165)/(62-87) 122/71    Physical Exam:  General Appearance: alert, appears stated age and cooperative  Head: normocephalic, without obvious abnormality and atraumatic  Throat: gums healthy and no oral lesions  Neck: no adenopathy, suppple, trachea midline, no thyromegaly, no carotid bruit and no JVD  Lungs: clear to auscultation, respirations regular, respirations even and respirations unlabored  Heart: regular rhythm & normal rate, normal S1, S2, no murmur, no lida, no rub and no click  Abdomen: normal bowel sounds, no masses and soft non-tender  Extremities: moves extremities well and no edema  Pulses: Pulses palpable and equal bilaterally  Skin: no bleeding, bruising or rash  Neurologic: Mental Status orientated to person, place, time and situation          Data Review:  Results from last 7 days   Lab Units 08/14/23  0246   WBC 10*3/mm3 7.02   HEMOGLOBIN g/dL 11.3*   HEMATOCRIT % 33.8*   PLATELETS 10*3/mm3 174      Results from last 7 days   Lab Units 08/14/23  0246   SODIUM mmol/L 139   POTASSIUM mmol/L 4.2   CHLORIDE mmol/L 106   CO2 mmol/L 22.0   BUN mg/dL 15   CREATININE mg/dL 0.76   GLUCOSE mg/dL 99   CALCIUM mg/dL 8.2*     Coagulation:   INR   Date Value Ref Range Status   08/12/2023 1.00 0.89 - 1.12 Final     Cardiac markers:   Results from last 7 days   Lab Units 08/12/23  1642   HSTROP T ng/L 41*     ABGs:       Invalid input(s): PO2      Imaging Results (Last 72 Hours)       Procedure Component Value Units Date/Time    XR Chest 1 View [[id number]] Collected: 08/11/23 1901     Updated: 08/11/23 1905    Narrative:      XR CHEST 1 VW    Date of Exam: 8/11/2023 6:20 PM EDT    Indication: Chest Pain Triage Protocol    Comparison: 8/11/2023    Findings:  Heart mediastinum and pulmonary vasculature appear within normal limits. There is very mild bibasilar discoid atelectasis, a little increased from the prior study, but the lungs otherwise appear grossly clear. No edema, effusion or pneumothorax is seen.      Impression:      Impression:  Very mild bibasilar atelectasis. No other evidence of active chest pathology.      Electronically Signed: Bernabe Ceja MD    8/11/2023 7:02 PM EDT    Workstation ID: [id number]              Cardiac catheterization 8/14/23:  Angiographic Findings:  Bilateral coronary dominance      LM:   Distal left main has 60 to 70% disease divides into LAD and circumflex  LAD:  LAD does not have any significant disease  LCX:   Ostial circumflex has haziness with about 80 to 90% disease  RCA:   Mid RCA has about 90 to 95% disease     Echo 8/12/23:   Echocardiogram Findings    Left Ventricle Left ventricular systolic function is normal. Estimated left ventricular EF = 55% Left ventricular ejection fraction appears to be 51 - 55%.     Normal left ventricular cavity size and wall thickness noted.   Right Ventricle Normal right ventricular cavity size and systolic function noted.   Left Atrium Normal left  atrial size and volume noted.   Right Atrium The right atrial cavity is mildly dilated.  The inferior vena cava is normally sized. Normal IVC inspiratory collapse of greater than 50% noted.   Aortic Valve The aortic valve is grossly normal in structure. The aortic valve was poorly visualized but appears trileaflet. No significant aortic valve regurgitation is present. No aortic valve stenosis is present.   Mitral Valve Mitral annular calcification is present. Mild mitral valve regurgitation is present. No significant mitral valve stenosis is present.   Tricuspid Valve The tricuspid valve is structurally normal with no stenosis present. Trace tricuspid valve regurgitation is present. Estimated right ventricular systolic pressure from tricuspid regurgitation is normal (<35 mmHg). Calculated right ventricular systolic pressure from tricuspid regurgitation is 10 mmHg.   Pulmonic Valve The pulmonic valve is structurally normal with no significant stenosis present. There is no significant pulmonic valve regurgitation present.   Greater Vessels No dilation of the aortic root is present. No dilation of the sinuses of Valsalva is present.   Pericardium The pericardium is normal. There is no evidence of pericardial effusion. .         Assessment:        NSTEMI (non-ST elevated myocardial infarction)    Atrial fibrillation    Coronary artery disease    Hypertension    Hypercholesterolemia    Tobacco abuse    COPD (chronic obstructive pulmonary disease)    Type 2 diabetes mellitus with hyperglycemia, without long-term current use of insulin        Plan:  73 y.o. male with a history of coronary artery disease s/p previous stents, current tobacco smoker, type 2 diabetes mellitus, hypertension, hyperlipidemia and COPD who presented to [facility] ED on 8/11 with complaints of crushing chest pain with cardiac catheterization revealing 60-70% distal left main, 80-90% ostial circumflex and 90-95% mid RCA coronary artery disease. Patient  states that he does not want to undergo coronary artery bypass grafting at this time.  He would like to be considered for conservative management/possible PCI.       Larissa Tobin PA-C  08/14/23  16:09 EDT        Electronically signed by Larissa Tobin PA-C at 08/14/23 0226

## 2023-08-15 NOTE — DISCHARGE SUMMARY
[facility] Medicine Services  DISCHARGE SUMMARY    Patient Name: Scott Milan  : 1950  MRN: [id number]    Date of Admission: 2023  6:13 PM  Date of Discharge:  8/15/2023  Primary Care Physician: Tigre Cates MD    Consults       Date and Time Order Name Status Description    2023 10:19 AM Inpatient Cardiothoracic Surgery Consult Completed     2023 12:34 AM Inpatient Cardiology Consult Completed             Hospital Course     Presenting Problem: Chest pain    Active Hospital Problems    Diagnosis  POA    **NSTEMI (non-ST elevated myocardial infarction) [I21.4]  Yes    Type 1 myocardial infarction [I21.9]  Yes    Type 2 diabetes mellitus with hyperglycemia, without long-term current use of insulin [E11.65]  Yes    Atrial fibrillation [I48.91]  Yes    Coronary artery disease [I25.10]  Yes    Hypertension [I10]  Yes    Hypercholesterolemia [E78.00]  Yes    Tobacco abuse [Z72.0]  Yes    COPD (chronic obstructive pulmonary disease) [J44.9]  Yes      Resolved Hospital Problems   No resolved problems to display.          Hospital Course:  Scott Milan is a 73 y.o. male with a history of CAD and prior stents, allergies to clopidogrel and Effient, hypertension, hyperlipidemia, COPD, tobacco abuse who is presenting with unstable angina with elevated troponins, admitted for NSTEMI.  Cardiology completed left heart cath  that had severe multivessel coronary artery disease.  Pt declined CABG.  Underwent LHC with PCI on 8/15 with ANALI to the RCA.     D/C home on ASA and Brilinta for 12 months.  Follow up with Dr. Mukherjee arranged for next week.      Discharge Follow Up Recommendations for outpatient labs/diagnostics:   Follow up with Dr. Mukherjee    Day of Discharge     HPI:   No CP, SOB, ready for LHC.        Vital Signs:   Temp:  [96.8 øF (36 øC)-98 øF (36.7 øC)] 96.8 øF (36 øC)  Heart Rate:  [68-84] 75  Resp:  [16-18] 18  BP: (115-151)/(60-79) 125/68  Flow  (L/min):  [2] 2      Physical Exam:  Constitutional: No acute distress, awake, alert  HENT: NCAT, mucous membranes moist  Respiratory: Clear to auscultation bilaterally, respiratory effort normal   Cardiovascular: RRR, no murmurs, rubs, or gallops  Gastrointestinal: Positive bowel sounds, soft, nontender, nondistended  Musculoskeletal: No bilateral ankle edema  Psychiatric: Appropriate affect, cooperative  Neurologic: Oriented x 3, no focal neuro deficits  Skin: No rashes    Pertinent  and/or Most Recent Results     LAB RESULTS:      Lab 08/14/23  0246 08/13/23  1937 08/13/23  1207 08/13/23  0429 08/12/23  1642 08/12/23  1504 08/12/23  1016 08/12/23  1016 08/11/23  1822 08/11/23  0235   WBC 7.02  --   --  8.42  --  9.98  --  8.88 7.93 9.06   HEMOGLOBIN 11.3*  --   --  10.8*  --  12.1*  --  12.6* 12.4* 13.3   HEMATOCRIT 33.8*  --   --  32.9*  --  36.9*  --  38.9 37.6 40.5   PLATELETS 174  --   --  175  --  193  --  205 194 237   NEUTROS ABS 3.99  --   --  4.82  --   --   --  5.70 5.18 5.76   IMMATURE GRANS (ABS) 0.03  --   --  0.03  --   --   --  0.03 0.01 0.04   LYMPHS ABS 1.93  --   --  2.25  --   --   --  1.87 1.55 1.84   MONOS ABS 0.75  --   --  0.85  --   --   --  0.81 0.76 0.92*   EOS ABS 0.30  --   --  0.43*  --   --   --  0.43* 0.39 0.46*   MCV 89.2  --   --  90.6  --  91.1  --  91.1 92.2 89.6   PROTIME  --   --   --   --   --   --   --  13.3  --   --    APTT  --   --   --   --   --   --   --  30.1*  --   --    HEPARIN ANTI-XA 0.69 0.91* 0.54 0.23* 0.15*  --    < > 0.10*  --   --    D DIMER QUANT  --   --   --   --   --   --   --   --   --  0.38    < > = values in this interval not displayed.         Lab 08/14/23  0246 08/13/23  0429 08/11/23  1822 08/11/23  0235   SODIUM 139 139 141 143   POTASSIUM 4.2 4.1 3.7 3.6   CHLORIDE 106 107 106 106   CO2 22.0 22.0 23.0 24.0   ANION GAP 11.0 10.0 12.0 13.0   BUN 15 19 19 18   CREATININE 0.76 0.85 0.74* 0.90   EGFR 93.7 90.6 94.5 89.1   GLUCOSE 99 97 190* 234*    CALCIUM 8.2* 8.2* 8.6 9.2   MAGNESIUM 2.1  --   --   --    PHOSPHORUS 2.7  --   --   --    HEMOGLOBIN A1C  --   --   --  6.90*   TSH  --   --   --  2.420         Lab 08/11/23  1822 08/11/23  0235   TOTAL PROTEIN 6.5 6.8   ALBUMIN 4.0 4.0   GLOBULIN 2.5 2.8   ALT (SGPT) 15 18   AST (SGOT) 15 19   BILIRUBIN 0.3 0.4   ALK PHOS 81 87   LIPASE 25 21         Lab 08/12/23  1642 08/12/23  1500 08/12/23  1016 08/11/23  1822 08/11/23  0459 08/11/23  0235   PROBNP  --   --   --  205.6  --  78.9   HSTROP T 41* 36*  --  38* 50* 22*   PROTIME  --   --  13.3  --   --   --    INR  --   --  1.00  --   --   --          Lab 08/11/23  0235   CHOLESTEROL 98   LDL CHOL 34   HDL CHOL 29*   TRIGLYCERIDES 222*             Brief Urine Lab Results       None          Microbiology Results (last 10 days)       ** No results found for the last 240 hours. **            Adult Transthoracic Echo Complete W/ Cont if Necessary Per Protocol    Result Date: 8/12/2023    Estimated left ventricular EF = 55%   Mild mitral valve regurgitation is present.     Cardiac Catheterization/Vascular Study    Result Date: 8/14/2023    Severe coronary artery disease with noncompliant to medications Evaluation for coronary artery bypass graft     XR Chest 1 View    Result Date: 8/11/2023  XR CHEST 1 VW Date of Exam: 8/11/2023 6:20 PM EDT Indication: Chest Pain Triage Protocol Comparison: 8/11/2023 Findings: Heart mediastinum and pulmonary vasculature appear within normal limits. There is very mild bibasilar discoid atelectasis, a little increased from the prior study, but the lungs otherwise appear grossly clear. No edema, effusion or pneumothorax is seen.     Impression: Very mild bibasilar atelectasis. No other evidence of active chest pathology. Electronically Signed: Bernabe Ceja MD  8/11/2023 7:02 PM EDT  Workstation ID: [id number]    XR Chest 1 View    Result Date: 8/11/2023  XR CHEST 1 VW Date of Exam: 8/11/2023 2:41 AM EDT Indication: Chest Pain Triage Protocol  Comparison: 11/16/2021. Findings: There is no pneumothorax, pleural effusion or focal airspace consolidation. Heart size and pulmonary vasculature appear within normal limits. Regional bones appear intact.     Impression: No acute cardiopulmonary abnormality. Electronically Signed: Abrahan Baugh MD  8/11/2023 2:59 AM EDT  Workstation ID: [id number]             Results for orders placed during the hospital encounter of 08/11/23    Adult Transthoracic Echo Complete W/ Cont if Necessary Per Protocol    Interpretation Summary    Estimated left ventricular EF = 55%    Mild mitral valve regurgitation is present.      Plan for Follow-up of Pending Labs/Results:     Discharge Details        Discharge Medications        ASK your doctor about these medications        Instructions Start Date   albuterol sulfate  (90 Base) MCG/ACT inhaler  Commonly known as: PROVENTIL HFA;VENTOLIN HFA;PROAIR HFA   1 puff, Inhalation, Every 4 Hours PRN      amLODIPine 10 MG tablet  Commonly known as: NORVASC  Ask about: Which instructions should I use?   10 mg, Oral, Daily      aspirin 81 MG tablet   81 mg, Oral, Daily      Fluticasone-Salmeterol 500-50 MCG/ACT DISKUS  Commonly known as: ADVAIR/WIXELA   1 puff, Inhalation, 2 Times Daily      HYDROcodone-acetaminophen 7.5-325 MG per tablet  Commonly known as: NORCO   1 tablet, Oral, Every 6 Hours PRN      metFORMIN 500 MG tablet  Commonly known as: GLUCOPHAGE   500 mg, Oral, 2 Times Daily      nitroglycerin 0.4 MG SL tablet  Commonly known as: NITROSTAT   0.4 mg, Sublingual, Every 5 Minutes PRN, Take no more than 3 doses in 15 minutes.      rosuvastatin 20 MG tablet  Commonly known as: CRESTOR   20 mg, Oral, Daily      sotalol 80 MG tablet  Commonly known as: BETAPACE   TAKE ONE (1) TABLET BY MOUTH TWICE A DAY       Spiriva HandiHaler 18 MCG per inhalation capsule  Generic drug: tiotropium   1 capsule, Inhalation, Nightly      tamsulosin 0.4 MG capsule 24 hr capsule  Commonly known as:  FLOMAX   1 capsule, Oral, Daily               Allergies   Allergen Reactions    Effient [Prasugrel] Rash    Clopidogrel Other (See Comments)     red, hot, flushed face    Prednisone Unknown - Low Severity         Discharge Disposition:      Diet:  Hospital:  Diet Order   Procedures    Diet: Cardiac Diets; Healthy Heart (2-3 Na+); Texture: Regular Texture (IDDSI 7); Fluid Consistency: Thin (IDDSI 0)       Activity:      Restrictions or Other Recommendations:  As tolerated       CODE STATUS:    Code Status and Medical Interventions:   Ordered at: 08/11/23 2011     Code Status (Patient has no pulse and is not breathing):    CPR (Attempt to Resuscitate)     Medical Interventions (Patient has pulse or is breathing):    Full Support       Future Appointments   Date Time Provider Department Center   8/23/2023  3:15 PM Sai Mukherjee III, MD [facility]                 Radha Mooney MD  08/15/23      Time Spent on Discharge:  I spent  25  minutes on this discharge activity which included: face-to-face encounter with the patient, reviewing the data in the system, coordination of the care with the nursing staff as well as consultants, documentation, and entering orders.

## 2023-08-17 ENCOUNTER — DOCUMENTATION (OUTPATIENT)
Dept: CARDIAC REHAB | Facility: HOSPITAL | Age: 73
End: 2023-08-17
Payer: MEDICARE

## 2023-08-17 NOTE — PROGRESS NOTES
Pt. Referred for Phase II Cardiac Rehab. Staff discussed benefits of exercise, program protocol, and educational material provided. Teach back verified.  Patient refused cardiac rehab at this time. Patient educated on AHA guidelines for home exercise.

## 2023-08-23 ENCOUNTER — OFFICE VISIT (OUTPATIENT)
Dept: CARDIOLOGY | Facility: CLINIC | Age: 73
End: 2023-08-23
Payer: MEDICARE

## 2023-08-23 VITALS
HEART RATE: 89 BPM | SYSTOLIC BLOOD PRESSURE: 112 MMHG | BODY MASS INDEX: 27.85 KG/M2 | DIASTOLIC BLOOD PRESSURE: 68 MMHG | OXYGEN SATURATION: 96 % | HEIGHT: 69 IN | WEIGHT: 188 LBS

## 2023-08-23 DIAGNOSIS — E78.00 HYPERCHOLESTEROLEMIA: ICD-10-CM

## 2023-08-23 DIAGNOSIS — I48.0 PAROXYSMAL ATRIAL FIBRILLATION: Primary | ICD-10-CM

## 2023-08-23 DIAGNOSIS — I10 PRIMARY HYPERTENSION: ICD-10-CM

## 2023-08-23 DIAGNOSIS — I25.10 CORONARY ARTERY DISEASE INVOLVING NATIVE CORONARY ARTERY OF NATIVE HEART WITHOUT ANGINA PECTORIS: ICD-10-CM

## 2023-08-23 PROCEDURE — 99214 OFFICE O/P EST MOD 30 MIN: CPT | Performed by: INTERNAL MEDICINE

## 2023-08-23 PROCEDURE — 3078F DIAST BP <80 MM HG: CPT | Performed by: INTERNAL MEDICINE

## 2023-08-23 PROCEDURE — 3074F SYST BP LT 130 MM HG: CPT | Performed by: INTERNAL MEDICINE

## 2023-08-23 NOTE — PROGRESS NOTES
[facility]  Office visit  Scott Milan  1950  [phone]    VISIT DATE:  8/23/2023      PCP: Tigre Cates MD  [address] [place] [address]    CC:  Chief Complaint   Patient presents with    Paroxysmal atrial fibrillation       PROBLEM LIST:  1.  Coronary artery disease                        A.  [facility], February 20, 2015: ANALI to OM, 50% occlusion of mid RCA, 60-70% occlusion of LAD.                        B.  scheduled functional assessment via left heart catheterization 3 weeks after intervention, patient did not keep appointment.                        C.  recurrent chest pain with left heart catheterization May 12, 2015: Drug-eluting stent to LAD, drug-eluting stent to the RCA.  EF 50%.  2.  Essential hypertension  3.  Dyslipidemia  4.  COPD with ongoing tobacco abuse  5.  History of ventricular fibrillation with arrest, 2012  6.  Family history precocious CAD     August 2023 -NSTEMI  TTE    Estimated left ventricular EF = 55%    Mild mitral valve regurgitation is present.  Cardiac catheterization  Distal left main has 60 to 70% disease divides into LAD and circumflex  LAD does not have any significant disease  Ostial circumflex has haziness with about 80 to 90% disease  Mid RCA has about 90 to 95% disease     Decided against surgical revascularization.    Elective PCI:   Successful OCT guided PCI of the distal RCA with overlapping 3.0 x 15 and 3.0 x 38 mm Xience ANALI postdilated with a 3.5 NC balloon at high pressure     ASSESSMENT:   Diagnosis Plan   1. Paroxysmal atrial fibrillation        2. Coronary artery disease involving native coronary artery of native heart without angina pectoris        3. Hypercholesterolemia        4. Primary hypertension            PLAN:  Paroxysmal atrial fibrillation: Currently stable and asymptomatic.  Chads Vascor equal to 3, previously intolerant anticoagulation.  Continue aspirin.  Continue sotalol 80 mg by mouth twice a  "day.    Coronary artery disease: Status post small NSTEMI with RCA PCI.  Currently asymptomatic.  He was instructed to restart Brilinta.  Otherwise continue current medical therapy.    Hyperlipidemia: Goal LDL less than 100.  Continue rosuvastatin 20 mg p.o. daily.    Hypertension: Goal less than 130/80 mmHg.  Continue amlodipine 5 mg by mouth daily.    Nicotine abuse: Congratulated him on his ability to quit smoking.    Limited hemorrhoidal bleeding: Offered reassurance, no further evaluation recommended.  Restarting Brilinta.    Chest wall skin cancer: Basal versus squamous cell.  Delay excision if cessation of antiplatelet therapy is required for least 3 months.    Subjective  Currently denies chest pain, limiting dyspnea on exertion or palpitations.  Has not smoked since discharge from the hospital.  Noticed some bright red blood on the toilet paper after going to the bathroom and stopped his Brilinta this weekend.  Has anterior chest wall skin cancer which will require excision, nonmelanoma.    PHYSICAL EXAMINATION:  Vitals:    08/23/23 1508   BP: 112/68   BP Location: Right arm   Patient Position: Sitting   Cuff Size: Adult   Pulse: 89   SpO2: 96%   Weight: 85.3 kg (188 lb)   Height: 175.3 cm (69\")       General Appearance:    Alert, cooperative, no distress, appears stated age   Head:    Normocephalic, without obvious abnormality, atraumatic   Eyes:    conjunctiva/corneas clear   Nose:   Nares normal, septum midline, mucosa normal, no drainage   Throat:   Lips, teeth and gums normal   Neck:   Supple, symmetrical, trachea midline, no carotid    bruit or JVD   Lungs:     Clear to auscultation bilaterally, respirations unlabored   Chest Wall:    No tenderness or deformity    Heart:    Regular rate and rhythm, S1 and S2 normal, no murmur, rub   or gallop, normal carotid impulse bilaterally without bruit.   Abdomen:     Soft, non-tender   Extremities:   Extremities normal, atraumatic, no cyanosis or edema "   Pulses:   2+ and symmetric all extremities   Skin:   Skin color, texture, turgor normal, no rashes or lesions       Diagnostic Data:  Procedures  Lab Results   Component Value Date    CHLPL 87 05/12/2015    TRIG 222 (H) 08/11/2023    HDL 29 (L) 08/11/2023     Lab Results   Component Value Date    GLUCOSE 99 08/14/2023    BUN 15 08/14/2023    CREATININE 0.76 08/14/2023     08/14/2023    K 4.2 08/14/2023     08/14/2023    CO2 22.0 08/14/2023     Lab Results   Component Value Date    HGBA1C 6.90 (H) 08/11/2023     Lab Results   Component Value Date    WBC 7.02 08/14/2023    HGB 11.3 (L) 08/14/2023    HCT 33.8 (L) 08/14/2023     08/14/2023       Allergies  Allergies   Allergen Reactions    Effient [Prasugrel] Rash    Clopidogrel Other (See Comments)     red, hot, flushed face    Prednisone Unknown - Low Severity       Current Medications    Current Outpatient Medications:     albuterol sulfate  (90 Base) MCG/ACT inhaler, Inhale 1 puff Every 4 (Four) Hours As Needed., Disp: , Rfl:     amLODIPine (NORVASC) 10 MG tablet, Take 1 tablet by mouth Daily., Disp: , Rfl:     aspirin 81 MG tablet, Take 1 tablet by mouth Daily., Disp: , Rfl:     Fluticasone-Salmeterol (ADVAIR/WIXELA) 500-50 MCG/ACT DISKUS, Inhale 1 puff 2 (Two) Times a Day., Disp: , Rfl:     HYDROcodone-acetaminophen (NORCO) 7.5-325 MG per tablet, Take 1 tablet by mouth Every 6 (Six) Hours As Needed for Moderate Pain  or Severe Pain ., Disp: 12 tablet, Rfl: 0    lisinopril (PRINIVIL,ZESTRIL) 5 MG tablet, Take 1 tablet by mouth Daily., Disp: 30 tablet, Rfl: 0    metFORMIN (GLUCOPHAGE) 500 MG tablet, Take 1 tablet by mouth 2 (Two) Times a Day., Disp: , Rfl:     nitroglycerin (NITROSTAT) 0.4 MG SL tablet, Place 1 tablet under the tongue Every 5 (Five) Minutes As Needed for Chest Pain. Take no more than 3 doses in 15 minutes., Disp: 100 tablet, Rfl: 0    pantoprazole (PROTONIX) 40 MG EC tablet, Take 1 tablet by mouth Every Morning., Disp: 30  tablet, Rfl: 0    rosuvastatin (CRESTOR) 20 MG tablet, Take 1 tablet by mouth Daily., Disp: 90 tablet, Rfl: 1    sotalol (BETAPACE) 80 MG tablet, TAKE ONE (1) TABLET BY MOUTH TWICE A DAY , Disp: 180 tablet, Rfl: 0    Spiriva HandiHaler 18 MCG per inhalation capsule, Place 1 capsule into inhaler and inhale Every Night., Disp: , Rfl:     tamsulosin (FLOMAX) 0.4 MG capsule 24 hr capsule, Take 1 capsule by mouth Daily., Disp: , Rfl:     ticagrelor (BRILINTA) 90 MG tablet tablet, Take 1 tablet by mouth 2 (Two) Times a Day. (Patient not taking: Reported on 2023), Disp: 60 tablet, Rfl: 0          ROS  Review of Systems   Cardiovascular:  Negative for chest pain, claudication and dyspnea on exertion.   Respiratory:  Negative for cough and shortness of breath.        SOCIAL HX  Social History     Socioeconomic History    Marital status:    Tobacco Use    Smoking status: Former     Packs/day: 2.00     Types: Cigarettes     Quit date: 2023     Years since quittin.0    Smokeless tobacco: Never   Vaping Use    Vaping Use: Never used   Substance and Sexual Activity    Alcohol use: No    Drug use: No    Sexual activity: Defer       FAMILY HX  Family History   Problem Relation Age of Onset    No Known Problems Mother     No Known Problems Father              Sai Mukherjee III, MD, FACC

## 2023-08-24 ENCOUNTER — READMISSION MANAGEMENT (OUTPATIENT)
Dept: CALL CENTER | Facility: HOSPITAL | Age: 73
End: 2023-08-24
Payer: MEDICARE

## 2023-08-24 NOTE — OUTREACH NOTE
AMI Week 2 Survey      Flowsheet Row Responses   [facility] facility patient discharged from? [place]   Does the patient have one of the following disease processes/diagnoses(primary or secondary)? Acute MI (STEMI,NSTEMI)   Week 2 attempt successful? No   Unsuccessful attempts Attempt 1            Sepideh LEWIS - Registered Nurse

## 2023-08-28 ENCOUNTER — TELEPHONE (OUTPATIENT)
Dept: CARDIOLOGY | Facility: CLINIC | Age: 73
End: 2023-08-28
Payer: MEDICARE

## 2023-08-28 NOTE — TELEPHONE ENCOUNTER
Called patient to report message above from . No answer. Left voice message with 's message on VM and to call back with if he had any questions.

## 2023-08-28 NOTE — TELEPHONE ENCOUNTER
Stopped his Brilinta. Last dose 8/27/23 morning. Stated that it make him short of air. Wears oxygen around the clock for COPD and had to turn it up higher for 3 hours yesterday because he could not breath. Please advise.

## 2023-08-29 NOTE — TELEPHONE ENCOUNTER
ANTOINE  Returning call on Brilinta. Notified of message from  that he needs to stay on Brilinta for at least 3 months or he will significantly increase his risk of having a heart attack. Stated cannot breathe when he takes the Brilinta. Told him that he has Effient and Clopidogrel listed as allergies. Call ended abruptly while still talking to patient.

## 2023-09-01 ENCOUNTER — READMISSION MANAGEMENT (OUTPATIENT)
Dept: CALL CENTER | Facility: HOSPITAL | Age: 73
End: 2023-09-01
Payer: MEDICARE

## 2023-09-01 NOTE — OUTREACH NOTE
"AMI Week 3 Survey      Flowsheet Row Responses   [facility] patient discharged from? [place]   Does the patient have one of the following disease processes/diagnoses(primary or secondary)? Acute MI (STEMI,NSTEMI)   Week 3 attempt successful? Yes   Call start time 1155   Call end time 1214   Discharge diagnosis NSTEMI   Medication alerts for this patient Pt has not been taking Brilinta r/t \"getting short of breath within 10 minutes of taking that .\" The pt reports that he took whatever blood thinner he had while in hospital w/o issue. He has called Cardiology office to discuss, he reports.   Meds reviewed with patient/caregiver? Yes   Is the patient having any side effects they believe may be caused by any medication additions or changes? Yes   Side effects comments  increased SOA from brilinta, pt reports   Does the patient have all prescriptions related to this admission filled (includes statins,anticoagulants,HTN meds,anti-arrhythmia meds) Yes   Is the patient taking all medications as directed (includes completed medication regime)? No   What is preventing the patient from taking all medications as directed? Side effects   Nursing Interventions Nurse provided patient education, Advised patient to call provider   Does the patient have a primary care provider?  Yes   Has the patient kept scheduled appointments due by today? Yes   DME comments Pt wearing O2 @ 2L but often puts up to 5L r/t sats that drop to 83% pt reports   Comments Pt reports he has stopped his blood thinner r/t increased SOA after taking.He reports that he has spoken w/ cardiology r/t this issue. Pt denies chest pain at this time.Encouraged pt to contact cardiology to discuss further options.   Did the patient receive a copy of their discharge instructions? Yes   What is the patient's perception of their health status since discharge? Same   Is the patient/caregiver able to teach back signs and symptoms of when to call for help " immediately: Sudden chest discomfort, Sudden discomfort in arms, back, neck or jaw, Sudden sweating or clammy skin, Shortness of breath at any time   Nursing interventions Nurse provided patient education   Is the patient/caregiver able to teach back lifestyle changes to help prevent MIs Quit smoking, Heart healthy diet   Is the patient/caregiver able to teach back ways to prevent a second heart attack: Take medications, Follow up with MD   If the patient is a current smoker, are they able to teach back resources for cessation? --  [pt smokes 3cig/day]   Is the patient/caregiver able to teach back the hierarchy of who to call/visit for symptoms/problems? PCP, Specialist, Home health nurse, Urgent Care, ED, 911 Yes   Week 3 call completed? Yes   Graduated Yes   Is the patient interested in additional calls from an ambulatory ? No   Call end time 1214            Lora Magana Registered Nurse

## 2024-01-18 ENCOUNTER — TELEPHONE (OUTPATIENT)
Dept: CARDIOLOGY | Facility: CLINIC | Age: 74
End: 2024-01-18

## (undated) DEVICE — MINI TREK CORONARY DILATATION CATHETER 2.0 MM X 12 MM / RAPID-EXCHANGE: Brand: MINI TREK

## (undated) DEVICE — NC TREK NEO™ CORONARY DILATATION CATHETER 3.50 MM X 15 MM / RAPID-EXCHANGE: Brand: NC TREK NEO™

## (undated) DEVICE — GW INQWIRE FC PTFE STD J/1.5 .035 260

## (undated) DEVICE — MODEL AT P65, P/N 701554-001KIT CONTENTS: HAND CONTROLLER, 3-WAY HIGH-PRESSURE STOPCOCK WITH ROTATING END AND PREMIUM HIGH-PRESSURE TUBING: Brand: ANGIOTOUCH® KIT

## (undated) DEVICE — KT CATH IMG DRAGONFLY/OPSTAR 2.7F 135CM

## (undated) DEVICE — ST INF PRI SMRTSTE 20DRP 2VLV 24ML 117

## (undated) DEVICE — Device: Brand: ASAHI SION BLUE

## (undated) DEVICE — DEV COMPR RADL PRELUDESYNCEZ 30ML 32CM

## (undated) DEVICE — GUIDE CATHETER: Brand: MACH1™

## (undated) DEVICE — ANGIOGRAPHIC CATHETER: Brand: EXPO™

## (undated) DEVICE — MODEL BT2000 P/N 700287-012KIT CONTENTS: MANIFOLD WITH SALINE AND CONTRAST PORTS, SALINE TUBING WITH SPIKE AND HAND SYRINGE, TRANSDUCER: Brand: BT2000 AUTOMATED MANIFOLD KIT

## (undated) DEVICE — ST EXT IV SMRTSTE 2VLV FIX M LL 6ML 41

## (undated) DEVICE — DEV INFL MONARCH 25W

## (undated) DEVICE — COPILOT BLEEDBACK CONTROL VALVE: Brand: COPILOT

## (undated) DEVICE — GW PERIPH GUIDERIGHT STD/EXCHNG/J/TIP SS 0.035IN 5X260CM

## (undated) DEVICE — NC TREK NEO™ CORONARY DILATATION CATHETER 3.00 MM X 15 MM / RAPID-EXCHANGE: Brand: NC TREK NEO™

## (undated) DEVICE — NC TREK NEO™ CORONARY DILATATION CATHETER 2.50 X 12 MM / RAPID-EXCHANGE: Brand: NC TREK NEO™

## (undated) DEVICE — GLIDESHEATH SLENDER STAINLESS STEEL KIT: Brand: GLIDESHEATH SLENDER

## (undated) DEVICE — ADULT, W/LG. BACK PAD, RADIOTRANSPARENT ELEMENT AND LEAD WIRE: Brand: DEFIBRILLATION ELECTRODES

## (undated) DEVICE — CATH DIAG EXPO .045 FL3.5 5F 100CM

## (undated) DEVICE — GUIDELINER CATHETERS ARE INTENDED TO BE USED IN CONJUNCTION WITH GUIDE CATHETERS TO ACCESS DISCRETE REGIONS OF THE CORONARY AND/OR PERIPHERAL VASCULATURE, AND TO FACILITATE PLACEMENT OF INTERVENTIONAL DEVICES.: Brand: GUIDELINER® V3 CATHETER

## (undated) DEVICE — PK CATH CARD 10